# Patient Record
Sex: FEMALE | Race: WHITE | URBAN - METROPOLITAN AREA
[De-identification: names, ages, dates, MRNs, and addresses within clinical notes are randomized per-mention and may not be internally consistent; named-entity substitution may affect disease eponyms.]

---

## 2017-01-01 ENCOUNTER — OFFICE VISIT (OUTPATIENT)
Dept: URGENT CARE | Facility: URGENT CARE | Age: 82
End: 2017-01-01
Payer: COMMERCIAL

## 2017-01-01 ENCOUNTER — TELEPHONE (OUTPATIENT)
Dept: URGENT CARE | Facility: URGENT CARE | Age: 82
End: 2017-01-01

## 2017-01-01 ENCOUNTER — HOSPITAL ENCOUNTER (OUTPATIENT)
Dept: CT IMAGING | Facility: CLINIC | Age: 82
Discharge: HOME | End: 2017-09-13
Attending: SURGERY | Admitting: SURGERY
Payer: COMMERCIAL

## 2017-01-01 ENCOUNTER — APPOINTMENT (OUTPATIENT)
Dept: GENERAL RADIOLOGY | Facility: CLINIC | Age: 82
DRG: 207 | End: 2017-01-01
Attending: INTERNAL MEDICINE
Payer: COMMERCIAL

## 2017-01-01 ENCOUNTER — ALLIED HEALTH/NURSE VISIT (OUTPATIENT)
Dept: CARDIOLOGY | Facility: CLINIC | Age: 82
End: 2017-01-01
Payer: COMMERCIAL

## 2017-01-01 ENCOUNTER — ANESTHESIA (OUTPATIENT)
Dept: INTENSIVE CARE | Facility: CLINIC | Age: 82
DRG: 207 | End: 2017-01-01
Payer: COMMERCIAL

## 2017-01-01 ENCOUNTER — OFFICE VISIT (OUTPATIENT)
Dept: PEDIATRICS | Facility: CLINIC | Age: 82
End: 2017-01-01
Payer: COMMERCIAL

## 2017-01-01 ENCOUNTER — TELEPHONE (OUTPATIENT)
Dept: PEDIATRICS | Facility: CLINIC | Age: 82
End: 2017-01-01

## 2017-01-01 ENCOUNTER — HOSPITAL ENCOUNTER (INPATIENT)
Facility: CLINIC | Age: 82
LOS: 15 days | DRG: 207 | End: 2017-12-07
Attending: EMERGENCY MEDICINE | Admitting: INTERNAL MEDICINE
Payer: COMMERCIAL

## 2017-01-01 ENCOUNTER — APPOINTMENT (OUTPATIENT)
Dept: GENERAL RADIOLOGY | Facility: CLINIC | Age: 82
DRG: 207 | End: 2017-01-01
Attending: ANESTHESIOLOGY
Payer: COMMERCIAL

## 2017-01-01 ENCOUNTER — RADIANT APPOINTMENT (OUTPATIENT)
Dept: GENERAL RADIOLOGY | Facility: CLINIC | Age: 82
End: 2017-01-01
Attending: FAMILY MEDICINE
Payer: COMMERCIAL

## 2017-01-01 ENCOUNTER — APPOINTMENT (OUTPATIENT)
Dept: SPEECH THERAPY | Facility: CLINIC | Age: 82
DRG: 207 | End: 2017-01-01
Attending: INTERNAL MEDICINE
Payer: COMMERCIAL

## 2017-01-01 ENCOUNTER — APPOINTMENT (OUTPATIENT)
Dept: CARDIOLOGY | Facility: CLINIC | Age: 82
DRG: 207 | End: 2017-01-01
Attending: INTERNAL MEDICINE
Payer: COMMERCIAL

## 2017-01-01 ENCOUNTER — OFFICE VISIT (OUTPATIENT)
Dept: SURGERY | Facility: CLINIC | Age: 82
End: 2017-01-01
Payer: COMMERCIAL

## 2017-01-01 ENCOUNTER — APPOINTMENT (OUTPATIENT)
Dept: CT IMAGING | Facility: CLINIC | Age: 82
DRG: 207 | End: 2017-01-01
Attending: INTERNAL MEDICINE
Payer: COMMERCIAL

## 2017-01-01 ENCOUNTER — APPOINTMENT (OUTPATIENT)
Dept: SPEECH THERAPY | Facility: CLINIC | Age: 82
DRG: 207 | End: 2017-01-01
Payer: COMMERCIAL

## 2017-01-01 ENCOUNTER — APPOINTMENT (OUTPATIENT)
Dept: GENERAL RADIOLOGY | Facility: CLINIC | Age: 82
DRG: 207 | End: 2017-01-01
Attending: EMERGENCY MEDICINE
Payer: COMMERCIAL

## 2017-01-01 ENCOUNTER — TELEPHONE (OUTPATIENT)
Dept: CARDIOLOGY | Facility: CLINIC | Age: 82
End: 2017-01-01

## 2017-01-01 ENCOUNTER — APPOINTMENT (OUTPATIENT)
Dept: PHYSICAL THERAPY | Facility: CLINIC | Age: 82
DRG: 207 | End: 2017-01-01
Attending: INTERNAL MEDICINE
Payer: COMMERCIAL

## 2017-01-01 ENCOUNTER — APPOINTMENT (OUTPATIENT)
Dept: OCCUPATIONAL THERAPY | Facility: CLINIC | Age: 82
DRG: 207 | End: 2017-01-01
Attending: INTERNAL MEDICINE
Payer: COMMERCIAL

## 2017-01-01 ENCOUNTER — APPOINTMENT (OUTPATIENT)
Dept: ULTRASOUND IMAGING | Facility: CLINIC | Age: 82
DRG: 207 | End: 2017-01-01
Attending: PHYSICIAN ASSISTANT
Payer: COMMERCIAL

## 2017-01-01 ENCOUNTER — OFFICE VISIT (OUTPATIENT)
Dept: CARDIOLOGY | Facility: CLINIC | Age: 82
End: 2017-01-01
Payer: COMMERCIAL

## 2017-01-01 ENCOUNTER — DOCUMENTATION ONLY (OUTPATIENT)
Dept: CARDIOLOGY | Facility: CLINIC | Age: 82
End: 2017-01-01

## 2017-01-01 ENCOUNTER — OFFICE VISIT (OUTPATIENT)
Dept: PODIATRY | Facility: CLINIC | Age: 82
End: 2017-01-01
Payer: COMMERCIAL

## 2017-01-01 ENCOUNTER — ANESTHESIA EVENT (OUTPATIENT)
Dept: INTENSIVE CARE | Facility: CLINIC | Age: 82
DRG: 207 | End: 2017-01-01
Payer: COMMERCIAL

## 2017-01-01 ENCOUNTER — APPOINTMENT (OUTPATIENT)
Dept: CARDIOLOGY | Facility: CLINIC | Age: 82
DRG: 207 | End: 2017-01-01
Attending: PHYSICIAN ASSISTANT
Payer: COMMERCIAL

## 2017-01-01 ENCOUNTER — APPOINTMENT (OUTPATIENT)
Dept: CT IMAGING | Facility: CLINIC | Age: 82
DRG: 207 | End: 2017-01-01
Attending: EMERGENCY MEDICINE
Payer: COMMERCIAL

## 2017-01-01 ENCOUNTER — RADIANT APPOINTMENT (OUTPATIENT)
Dept: GENERAL RADIOLOGY | Facility: CLINIC | Age: 82
End: 2017-01-01
Attending: PODIATRIST
Payer: COMMERCIAL

## 2017-01-01 VITALS
SYSTOLIC BLOOD PRESSURE: 116 MMHG | TEMPERATURE: 97.5 F | RESPIRATION RATE: 20 BRPM | OXYGEN SATURATION: 96 % | HEART RATE: 74 BPM | DIASTOLIC BLOOD PRESSURE: 70 MMHG

## 2017-01-01 VITALS
TEMPERATURE: 97.1 F | WEIGHT: 170 LBS | RESPIRATION RATE: 30 BRPM | OXYGEN SATURATION: 92 % | HEIGHT: 60 IN | HEART RATE: 60 BPM | SYSTOLIC BLOOD PRESSURE: 100 MMHG | DIASTOLIC BLOOD PRESSURE: 56 MMHG | BODY MASS INDEX: 33.38 KG/M2

## 2017-01-01 VITALS
TEMPERATURE: 96.7 F | OXYGEN SATURATION: 95 % | HEIGHT: 60 IN | BODY MASS INDEX: 33.8 KG/M2 | SYSTOLIC BLOOD PRESSURE: 118 MMHG | DIASTOLIC BLOOD PRESSURE: 66 MMHG | HEART RATE: 60 BPM | WEIGHT: 172.19 LBS

## 2017-01-01 VITALS
WEIGHT: 170.9 LBS | OXYGEN SATURATION: 95 % | HEIGHT: 60 IN | SYSTOLIC BLOOD PRESSURE: 112 MMHG | TEMPERATURE: 97.8 F | BODY MASS INDEX: 33.55 KG/M2 | HEART RATE: 78 BPM | DIASTOLIC BLOOD PRESSURE: 68 MMHG

## 2017-01-01 VITALS
SYSTOLIC BLOOD PRESSURE: 93 MMHG | OXYGEN SATURATION: 100 % | DIASTOLIC BLOOD PRESSURE: 47 MMHG | HEART RATE: 60 BPM | WEIGHT: 171.96 LBS | TEMPERATURE: 101.7 F | HEIGHT: 60 IN | RESPIRATION RATE: 26 BRPM | BODY MASS INDEX: 33.76 KG/M2

## 2017-01-01 VITALS
OXYGEN SATURATION: 93 % | BODY MASS INDEX: 34.36 KG/M2 | HEIGHT: 60 IN | DIASTOLIC BLOOD PRESSURE: 68 MMHG | HEART RATE: 79 BPM | SYSTOLIC BLOOD PRESSURE: 104 MMHG | WEIGHT: 175 LBS

## 2017-01-01 VITALS
OXYGEN SATURATION: 95 % | SYSTOLIC BLOOD PRESSURE: 136 MMHG | DIASTOLIC BLOOD PRESSURE: 80 MMHG | TEMPERATURE: 96.3 F | BODY MASS INDEX: 33.59 KG/M2 | WEIGHT: 172 LBS | HEART RATE: 89 BPM

## 2017-01-01 VITALS
WEIGHT: 175 LBS | HEART RATE: 86 BPM | DIASTOLIC BLOOD PRESSURE: 70 MMHG | BODY MASS INDEX: 34.36 KG/M2 | TEMPERATURE: 97.6 F | SYSTOLIC BLOOD PRESSURE: 122 MMHG | OXYGEN SATURATION: 97 % | HEIGHT: 60 IN

## 2017-01-01 VITALS — BODY MASS INDEX: 33.38 KG/M2 | HEIGHT: 60 IN | RESPIRATION RATE: 18 BRPM | WEIGHT: 170 LBS

## 2017-01-01 VITALS
HEIGHT: 60 IN | HEART RATE: 80 BPM | BODY MASS INDEX: 33.57 KG/M2 | SYSTOLIC BLOOD PRESSURE: 124 MMHG | DIASTOLIC BLOOD PRESSURE: 80 MMHG | WEIGHT: 171 LBS

## 2017-01-01 DIAGNOSIS — I71.40 ABDOMINAL AORTIC ANEURYSM (AAA) WITHOUT RUPTURE (H): ICD-10-CM

## 2017-01-01 DIAGNOSIS — I10 BENIGN ESSENTIAL HYPERTENSION: ICD-10-CM

## 2017-01-01 DIAGNOSIS — E78.5 HYPERLIPIDEMIA LDL GOAL <100: ICD-10-CM

## 2017-01-01 DIAGNOSIS — R06.03 ACUTE RESPIRATORY DISTRESS: Primary | ICD-10-CM

## 2017-01-01 DIAGNOSIS — I71.019 AORTIC DISSECTION, THORACIC (H): Primary | ICD-10-CM

## 2017-01-01 DIAGNOSIS — N39.0 URINARY TRACT INFECTION WITHOUT HEMATURIA, SITE UNSPECIFIED: Primary | ICD-10-CM

## 2017-01-01 DIAGNOSIS — I71.00 AORTIC DISSECTION (H): ICD-10-CM

## 2017-01-01 DIAGNOSIS — J18.9 COMMUNITY ACQUIRED PNEUMONIA, UNSPECIFIED LATERALITY: ICD-10-CM

## 2017-01-01 DIAGNOSIS — I71.019 AORTIC DISSECTION, THORACIC (H): ICD-10-CM

## 2017-01-01 DIAGNOSIS — S82.51XA CLOSED DISPLACED FRACTURE OF MEDIAL MALLEOLUS OF RIGHT TIBIA, INITIAL ENCOUNTER: Primary | ICD-10-CM

## 2017-01-01 DIAGNOSIS — G60.9 PERIPHERAL NEUROPATHY, IDIOPATHIC: ICD-10-CM

## 2017-01-01 DIAGNOSIS — I48.0 PAROXYSMAL ATRIAL FIBRILLATION (H): Primary | ICD-10-CM

## 2017-01-01 DIAGNOSIS — M79.604 PAIN OF RIGHT LOWER EXTREMITY: ICD-10-CM

## 2017-01-01 DIAGNOSIS — Z95.0 CARDIAC PACEMAKER IN SITU: Primary | ICD-10-CM

## 2017-01-01 DIAGNOSIS — R73.03 PREDIABETES: ICD-10-CM

## 2017-01-01 DIAGNOSIS — M81.0 OSTEOPOROSIS: ICD-10-CM

## 2017-01-01 DIAGNOSIS — R30.0 DYSURIA: ICD-10-CM

## 2017-01-01 DIAGNOSIS — I10 ESSENTIAL HYPERTENSION, BENIGN: ICD-10-CM

## 2017-01-01 DIAGNOSIS — N18.30 CKD (CHRONIC KIDNEY DISEASE) STAGE 3, GFR 30-59 ML/MIN (H): ICD-10-CM

## 2017-01-01 DIAGNOSIS — E66.9 OBESITY (BMI 30-39.9): ICD-10-CM

## 2017-01-01 DIAGNOSIS — G11.9 ATAXIA DUE TO CEREBELLAR DEGENERATION (H): Primary | ICD-10-CM

## 2017-01-01 DIAGNOSIS — I48.20 CHRONIC ATRIAL FIBRILLATION (H): ICD-10-CM

## 2017-01-01 DIAGNOSIS — R09.02 HYPOXIA: ICD-10-CM

## 2017-01-01 DIAGNOSIS — Z23 NEED FOR PROPHYLACTIC VACCINATION AND INOCULATION AGAINST INFLUENZA: ICD-10-CM

## 2017-01-01 DIAGNOSIS — N39.3 URINARY, INCONTINENCE, STRESS FEMALE: ICD-10-CM

## 2017-01-01 DIAGNOSIS — S82.891A CLOSED FRACTURE OF RIGHT ANKLE, INITIAL ENCOUNTER: ICD-10-CM

## 2017-01-01 DIAGNOSIS — Z79.01 CURRENT USE OF LONG TERM ANTICOAGULATION: ICD-10-CM

## 2017-01-01 DIAGNOSIS — G11.9 ATAXIA DUE TO CEREBELLAR DEGENERATION (H): ICD-10-CM

## 2017-01-01 DIAGNOSIS — R82.90 NONSPECIFIC FINDING ON EXAMINATION OF URINE: ICD-10-CM

## 2017-01-01 DIAGNOSIS — S99.911A INJURY OF RIGHT ANKLE, INITIAL ENCOUNTER: Primary | ICD-10-CM

## 2017-01-01 DIAGNOSIS — N28.9 RENAL INSUFFICIENCY: ICD-10-CM

## 2017-01-01 DIAGNOSIS — S99.911A INJURY OF RIGHT ANKLE, INITIAL ENCOUNTER: ICD-10-CM

## 2017-01-01 DIAGNOSIS — E78.5 HYPERLIPIDEMIA LDL GOAL <100: Primary | ICD-10-CM

## 2017-01-01 DIAGNOSIS — Z00.00 ROUTINE GENERAL MEDICAL EXAMINATION AT A HEALTH CARE FACILITY: Primary | ICD-10-CM

## 2017-01-01 DIAGNOSIS — M81.0 AGE-RELATED OSTEOPOROSIS WITHOUT CURRENT PATHOLOGICAL FRACTURE: ICD-10-CM

## 2017-01-01 DIAGNOSIS — I25.10 CORONARY ARTERY DISEASE INVOLVING NATIVE CORONARY ARTERY OF NATIVE HEART WITHOUT ANGINA PECTORIS: ICD-10-CM

## 2017-01-01 LAB
ALBUMIN SERPL-MCNC: 1.7 G/DL (ref 3.4–5)
ALBUMIN SERPL-MCNC: 2 G/DL (ref 3.4–5)
ALBUMIN SERPL-MCNC: 2.9 G/DL (ref 3.4–5)
ALBUMIN UR-MCNC: 100 MG/DL
ALBUMIN UR-MCNC: NEGATIVE MG/DL
ALP SERPL-CCNC: 104 U/L (ref 40–150)
ALP SERPL-CCNC: 109 U/L (ref 40–150)
ALP SERPL-CCNC: 149 U/L (ref 40–150)
ALT SERPL W P-5'-P-CCNC: 14 U/L (ref 0–50)
ALT SERPL W P-5'-P-CCNC: 14 U/L (ref 0–50)
ALT SERPL W P-5'-P-CCNC: 18 U/L (ref 0–50)
AMORPH CRY #/AREA URNS HPF: ABNORMAL /HPF
ANION GAP SERPL CALCULATED.3IONS-SCNC: 10 MMOL/L (ref 3–14)
ANION GAP SERPL CALCULATED.3IONS-SCNC: 11 MMOL/L (ref 3–14)
ANION GAP SERPL CALCULATED.3IONS-SCNC: 11 MMOL/L (ref 3–14)
ANION GAP SERPL CALCULATED.3IONS-SCNC: 5 MMOL/L (ref 3–14)
ANION GAP SERPL CALCULATED.3IONS-SCNC: 6 MMOL/L (ref 3–14)
ANION GAP SERPL CALCULATED.3IONS-SCNC: 6 MMOL/L (ref 3–14)
ANION GAP SERPL CALCULATED.3IONS-SCNC: 7 MMOL/L (ref 3–14)
ANION GAP SERPL CALCULATED.3IONS-SCNC: 7 MMOL/L (ref 3–14)
ANION GAP SERPL CALCULATED.3IONS-SCNC: 8 MMOL/L (ref 3–14)
ANION GAP SERPL CALCULATED.3IONS-SCNC: 9 MMOL/L (ref 3–14)
ANION GAP SERPL CALCULATED.3IONS-SCNC: 9 MMOL/L (ref 3–14)
APPEARANCE UR: ABNORMAL
APPEARANCE UR: ABNORMAL
APPEARANCE UR: CLEAR
APPEARANCE UR: CLEAR
AST SERPL W P-5'-P-CCNC: 14 U/L (ref 0–45)
AST SERPL W P-5'-P-CCNC: 18 U/L (ref 0–45)
AST SERPL W P-5'-P-CCNC: 26 U/L (ref 0–45)
BACTERIA #/AREA URNS HPF: ABNORMAL /HPF
BACTERIA #/AREA URNS HPF: ABNORMAL /HPF
BACTERIA SPEC CULT: ABNORMAL
BACTERIA SPEC CULT: ABNORMAL
BACTERIA SPEC CULT: NO GROWTH
BASE EXCESS BLDA CALC-SCNC: 0.5 MMOL/L
BASE EXCESS BLDA CALC-SCNC: 0.9 MMOL/L
BASE EXCESS BLDA CALC-SCNC: 1.3 MMOL/L
BASE EXCESS BLDA CALC-SCNC: 1.5 MMOL/L
BASE EXCESS BLDA CALC-SCNC: 1.8 MMOL/L
BASE EXCESS BLDA CALC-SCNC: 1.9 MMOL/L
BASE EXCESS BLDA CALC-SCNC: 2.4 MMOL/L
BASE EXCESS BLDA CALC-SCNC: 2.4 MMOL/L
BASE EXCESS BLDA CALC-SCNC: 3.5 MMOL/L
BASE EXCESS BLDA CALC-SCNC: 5.1 MMOL/L
BASE EXCESS BLDV CALC-SCNC: 1.3 MMOL/L
BASE EXCESS BLDV CALC-SCNC: 6.9 MMOL/L
BASOPHILS # BLD AUTO: 0 10E9/L (ref 0–0.2)
BASOPHILS # BLD AUTO: 0.1 10E9/L (ref 0–0.2)
BASOPHILS # BLD AUTO: 0.1 10E9/L (ref 0–0.2)
BASOPHILS NFR BLD AUTO: 0.2 %
BASOPHILS NFR BLD AUTO: 0.4 %
BASOPHILS NFR BLD AUTO: 0.5 %
BILIRUB DIRECT SERPL-MCNC: 0.2 MG/DL (ref 0–0.2)
BILIRUB SERPL-MCNC: 0.3 MG/DL (ref 0.2–1.3)
BILIRUB SERPL-MCNC: 0.5 MG/DL (ref 0.2–1.3)
BILIRUB SERPL-MCNC: 0.7 MG/DL (ref 0.2–1.3)
BILIRUB UR QL STRIP: NEGATIVE
BUN SERPL-MCNC: 24 MG/DL (ref 7–30)
BUN SERPL-MCNC: 24 MG/DL (ref 7–30)
BUN SERPL-MCNC: 26 MG/DL (ref 7–30)
BUN SERPL-MCNC: 28 MG/DL (ref 7–30)
BUN SERPL-MCNC: 31 MG/DL (ref 7–30)
BUN SERPL-MCNC: 34 MG/DL (ref 7–30)
BUN SERPL-MCNC: 35 MG/DL (ref 7–30)
BUN SERPL-MCNC: 42 MG/DL (ref 7–30)
BUN SERPL-MCNC: 45 MG/DL (ref 7–30)
BUN SERPL-MCNC: 49 MG/DL (ref 7–30)
BUN SERPL-MCNC: 49 MG/DL (ref 7–30)
BUN SERPL-MCNC: 51 MG/DL (ref 7–30)
BUN SERPL-MCNC: 54 MG/DL (ref 7–30)
BUN SERPL-MCNC: 60 MG/DL (ref 7–30)
BUN SERPL-MCNC: 62 MG/DL (ref 7–30)
C DIFF TOX B STL QL: NEGATIVE
CA-I BLD-MCNC: 4.3 MG/DL (ref 4.4–5.2)
CALCIUM SERPL-MCNC: 6.5 MG/DL (ref 8.5–10.1)
CALCIUM SERPL-MCNC: 7.6 MG/DL (ref 8.5–10.1)
CALCIUM SERPL-MCNC: 7.6 MG/DL (ref 8.5–10.1)
CALCIUM SERPL-MCNC: 7.8 MG/DL (ref 8.5–10.1)
CALCIUM SERPL-MCNC: 7.8 MG/DL (ref 8.5–10.1)
CALCIUM SERPL-MCNC: 8 MG/DL (ref 8.5–10.1)
CALCIUM SERPL-MCNC: 8.1 MG/DL (ref 8.5–10.1)
CALCIUM SERPL-MCNC: 8.3 MG/DL (ref 8.5–10.1)
CALCIUM SERPL-MCNC: 8.4 MG/DL (ref 8.5–10.1)
CALCIUM SERPL-MCNC: 8.4 MG/DL (ref 8.5–10.1)
CALCIUM SERPL-MCNC: 8.5 MG/DL (ref 8.5–10.1)
CALCIUM SERPL-MCNC: 8.6 MG/DL (ref 8.5–10.1)
CALCIUM SERPL-MCNC: 8.7 MG/DL (ref 8.5–10.1)
CHLORIDE SERPL-SCNC: 100 MMOL/L (ref 94–109)
CHLORIDE SERPL-SCNC: 101 MMOL/L (ref 94–109)
CHLORIDE SERPL-SCNC: 102 MMOL/L (ref 94–109)
CHLORIDE SERPL-SCNC: 103 MMOL/L (ref 94–109)
CHLORIDE SERPL-SCNC: 104 MMOL/L (ref 94–109)
CHLORIDE SERPL-SCNC: 104 MMOL/L (ref 94–109)
CHLORIDE SERPL-SCNC: 106 MMOL/L (ref 94–109)
CHLORIDE SERPL-SCNC: 109 MMOL/L (ref 94–109)
CHLORIDE SERPL-SCNC: 110 MMOL/L (ref 94–109)
CHLORIDE SERPL-SCNC: 111 MMOL/L (ref 94–109)
CHLORIDE SERPL-SCNC: 112 MMOL/L (ref 94–109)
CHLORIDE SERPL-SCNC: 98 MMOL/L (ref 94–109)
CHOLEST SERPL-MCNC: 99 MG/DL
CO2 BLDCOV-SCNC: 26 MMOL/L (ref 21–28)
CO2 SERPL-SCNC: 23 MMOL/L (ref 20–32)
CO2 SERPL-SCNC: 25 MMOL/L (ref 20–32)
CO2 SERPL-SCNC: 26 MMOL/L (ref 20–32)
CO2 SERPL-SCNC: 27 MMOL/L (ref 20–32)
CO2 SERPL-SCNC: 27 MMOL/L (ref 20–32)
CO2 SERPL-SCNC: 28 MMOL/L (ref 20–32)
CO2 SERPL-SCNC: 28 MMOL/L (ref 20–32)
CO2 SERPL-SCNC: 29 MMOL/L (ref 20–32)
CO2 SERPL-SCNC: 29 MMOL/L (ref 20–32)
CO2 SERPL-SCNC: 31 MMOL/L (ref 20–32)
COLOR UR AUTO: YELLOW
CORTIS SERPL-MCNC: 48 UG/DL (ref 4–22)
CREAT BLD-MCNC: 1.2 MG/DL (ref 0.52–1.04)
CREAT SERPL-MCNC: 0.8 MG/DL (ref 0.52–1.04)
CREAT SERPL-MCNC: 0.98 MG/DL (ref 0.52–1.04)
CREAT SERPL-MCNC: 1.01 MG/DL (ref 0.52–1.04)
CREAT SERPL-MCNC: 1.02 MG/DL (ref 0.52–1.04)
CREAT SERPL-MCNC: 1.04 MG/DL (ref 0.52–1.04)
CREAT SERPL-MCNC: 1.05 MG/DL (ref 0.52–1.04)
CREAT SERPL-MCNC: 1.05 MG/DL (ref 0.52–1.04)
CREAT SERPL-MCNC: 1.07 MG/DL (ref 0.52–1.04)
CREAT SERPL-MCNC: 1.08 MG/DL (ref 0.52–1.04)
CREAT SERPL-MCNC: 1.08 MG/DL (ref 0.52–1.04)
CREAT SERPL-MCNC: 1.1 MG/DL (ref 0.52–1.04)
CREAT SERPL-MCNC: 1.13 MG/DL (ref 0.52–1.04)
CREAT SERPL-MCNC: 1.17 MG/DL (ref 0.52–1.04)
CREAT SERPL-MCNC: 1.19 MG/DL (ref 0.52–1.04)
CREAT SERPL-MCNC: 1.26 MG/DL (ref 0.52–1.04)
CREAT SERPL-MCNC: 1.3 MG/DL (ref 0.52–1.04)
CREAT SERPL-MCNC: 1.35 MG/DL (ref 0.52–1.04)
CREAT UR-MCNC: 119 MG/DL
CRP SERPL-MCNC: 82.6 MG/L (ref 0–8)
D DIMER PPP FEU-MCNC: 3.1 UG/ML FEU (ref 0–0.5)
DIFFERENTIAL METHOD BLD: ABNORMAL
EOSINOPHIL # BLD AUTO: 0.3 10E9/L (ref 0–0.7)
EOSINOPHIL # BLD AUTO: 0.4 10E9/L (ref 0–0.7)
EOSINOPHIL # BLD AUTO: 0.6 10E9/L (ref 0–0.7)
EOSINOPHIL NFR BLD AUTO: 2.4 %
EOSINOPHIL NFR BLD AUTO: 2.4 %
EOSINOPHIL NFR BLD AUTO: 3.9 %
ERYTHROCYTE [DISTWIDTH] IN BLOOD BY AUTOMATED COUNT: 13.6 % (ref 10–15)
ERYTHROCYTE [DISTWIDTH] IN BLOOD BY AUTOMATED COUNT: 13.7 % (ref 10–15)
ERYTHROCYTE [DISTWIDTH] IN BLOOD BY AUTOMATED COUNT: 13.8 % (ref 10–15)
ERYTHROCYTE [DISTWIDTH] IN BLOOD BY AUTOMATED COUNT: 13.8 % (ref 10–15)
ERYTHROCYTE [DISTWIDTH] IN BLOOD BY AUTOMATED COUNT: 14 % (ref 10–15)
ERYTHROCYTE [DISTWIDTH] IN BLOOD BY AUTOMATED COUNT: 14 % (ref 10–15)
ERYTHROCYTE [DISTWIDTH] IN BLOOD BY AUTOMATED COUNT: 14.1 % (ref 10–15)
ERYTHROCYTE [DISTWIDTH] IN BLOOD BY AUTOMATED COUNT: 14.2 % (ref 10–15)
ERYTHROCYTE [DISTWIDTH] IN BLOOD BY AUTOMATED COUNT: 14.3 % (ref 10–15)
ERYTHROCYTE [DISTWIDTH] IN BLOOD BY AUTOMATED COUNT: 14.5 % (ref 10–15)
ERYTHROCYTE [DISTWIDTH] IN BLOOD BY AUTOMATED COUNT: 14.6 % (ref 10–15)
FLUAV H1 2009 PAND RNA SPEC QL NAA+PROBE: NEGATIVE
FLUAV H1 RNA SPEC QL NAA+PROBE: NEGATIVE
FLUAV H3 RNA SPEC QL NAA+PROBE: NEGATIVE
FLUAV RNA SPEC QL NAA+PROBE: NEGATIVE
FLUAV+FLUBV AG SPEC QL: NEGATIVE
FLUAV+FLUBV AG SPEC QL: NEGATIVE
FLUBV RNA SPEC QL NAA+PROBE: NEGATIVE
GFR SERPL CREATININE-BSD FRML MDRD: 37 ML/MIN/1.7M2
GFR SERPL CREATININE-BSD FRML MDRD: 39 ML/MIN/1.7M2
GFR SERPL CREATININE-BSD FRML MDRD: 40 ML/MIN/1.7M2
GFR SERPL CREATININE-BSD FRML MDRD: 42 ML/MIN/1.7M2
GFR SERPL CREATININE-BSD FRML MDRD: 43 ML/MIN/1.7M2
GFR SERPL CREATININE-BSD FRML MDRD: 44 ML/MIN/1.7M2
GFR SERPL CREATININE-BSD FRML MDRD: 45 ML/MIN/1.7M2
GFR SERPL CREATININE-BSD FRML MDRD: 47 ML/MIN/1.7M2
GFR SERPL CREATININE-BSD FRML MDRD: 48 ML/MIN/1.7M2
GFR SERPL CREATININE-BSD FRML MDRD: 49 ML/MIN/1.7M2
GFR SERPL CREATININE-BSD FRML MDRD: 49 ML/MIN/1.7M2
GFR SERPL CREATININE-BSD FRML MDRD: 50 ML/MIN/1.7M2
GFR SERPL CREATININE-BSD FRML MDRD: 51 ML/MIN/1.7M2
GFR SERPL CREATININE-BSD FRML MDRD: 52 ML/MIN/1.7M2
GFR SERPL CREATININE-BSD FRML MDRD: 53 ML/MIN/1.7M2
GFR SERPL CREATININE-BSD FRML MDRD: 68 ML/MIN/1.7M2
GLUCOSE BLDC GLUCOMTR-MCNC: 101 MG/DL (ref 70–99)
GLUCOSE BLDC GLUCOMTR-MCNC: 102 MG/DL (ref 70–99)
GLUCOSE BLDC GLUCOMTR-MCNC: 107 MG/DL (ref 70–99)
GLUCOSE BLDC GLUCOMTR-MCNC: 115 MG/DL (ref 70–99)
GLUCOSE BLDC GLUCOMTR-MCNC: 118 MG/DL (ref 70–99)
GLUCOSE BLDC GLUCOMTR-MCNC: 120 MG/DL (ref 70–99)
GLUCOSE BLDC GLUCOMTR-MCNC: 123 MG/DL (ref 70–99)
GLUCOSE BLDC GLUCOMTR-MCNC: 124 MG/DL (ref 70–99)
GLUCOSE BLDC GLUCOMTR-MCNC: 124 MG/DL (ref 70–99)
GLUCOSE BLDC GLUCOMTR-MCNC: 125 MG/DL (ref 70–99)
GLUCOSE BLDC GLUCOMTR-MCNC: 126 MG/DL (ref 70–99)
GLUCOSE BLDC GLUCOMTR-MCNC: 127 MG/DL (ref 70–99)
GLUCOSE BLDC GLUCOMTR-MCNC: 130 MG/DL (ref 70–99)
GLUCOSE BLDC GLUCOMTR-MCNC: 131 MG/DL (ref 70–99)
GLUCOSE BLDC GLUCOMTR-MCNC: 132 MG/DL (ref 70–99)
GLUCOSE BLDC GLUCOMTR-MCNC: 132 MG/DL (ref 70–99)
GLUCOSE BLDC GLUCOMTR-MCNC: 133 MG/DL (ref 70–99)
GLUCOSE BLDC GLUCOMTR-MCNC: 133 MG/DL (ref 70–99)
GLUCOSE BLDC GLUCOMTR-MCNC: 134 MG/DL (ref 70–99)
GLUCOSE BLDC GLUCOMTR-MCNC: 134 MG/DL (ref 70–99)
GLUCOSE BLDC GLUCOMTR-MCNC: 137 MG/DL (ref 70–99)
GLUCOSE BLDC GLUCOMTR-MCNC: 139 MG/DL (ref 70–99)
GLUCOSE BLDC GLUCOMTR-MCNC: 140 MG/DL (ref 70–99)
GLUCOSE BLDC GLUCOMTR-MCNC: 140 MG/DL (ref 70–99)
GLUCOSE BLDC GLUCOMTR-MCNC: 143 MG/DL (ref 70–99)
GLUCOSE BLDC GLUCOMTR-MCNC: 144 MG/DL (ref 70–99)
GLUCOSE BLDC GLUCOMTR-MCNC: 144 MG/DL (ref 70–99)
GLUCOSE BLDC GLUCOMTR-MCNC: 145 MG/DL (ref 70–99)
GLUCOSE BLDC GLUCOMTR-MCNC: 146 MG/DL (ref 70–99)
GLUCOSE BLDC GLUCOMTR-MCNC: 147 MG/DL (ref 70–99)
GLUCOSE BLDC GLUCOMTR-MCNC: 148 MG/DL (ref 70–99)
GLUCOSE BLDC GLUCOMTR-MCNC: 148 MG/DL (ref 70–99)
GLUCOSE BLDC GLUCOMTR-MCNC: 149 MG/DL (ref 70–99)
GLUCOSE BLDC GLUCOMTR-MCNC: 150 MG/DL (ref 70–99)
GLUCOSE BLDC GLUCOMTR-MCNC: 151 MG/DL (ref 70–99)
GLUCOSE BLDC GLUCOMTR-MCNC: 153 MG/DL (ref 70–99)
GLUCOSE BLDC GLUCOMTR-MCNC: 154 MG/DL (ref 70–99)
GLUCOSE BLDC GLUCOMTR-MCNC: 154 MG/DL (ref 70–99)
GLUCOSE BLDC GLUCOMTR-MCNC: 155 MG/DL (ref 70–99)
GLUCOSE BLDC GLUCOMTR-MCNC: 156 MG/DL (ref 70–99)
GLUCOSE BLDC GLUCOMTR-MCNC: 156 MG/DL (ref 70–99)
GLUCOSE BLDC GLUCOMTR-MCNC: 157 MG/DL (ref 70–99)
GLUCOSE BLDC GLUCOMTR-MCNC: 158 MG/DL (ref 70–99)
GLUCOSE BLDC GLUCOMTR-MCNC: 159 MG/DL (ref 70–99)
GLUCOSE BLDC GLUCOMTR-MCNC: 160 MG/DL (ref 70–99)
GLUCOSE BLDC GLUCOMTR-MCNC: 162 MG/DL (ref 70–99)
GLUCOSE BLDC GLUCOMTR-MCNC: 163 MG/DL (ref 70–99)
GLUCOSE BLDC GLUCOMTR-MCNC: 163 MG/DL (ref 70–99)
GLUCOSE BLDC GLUCOMTR-MCNC: 164 MG/DL (ref 70–99)
GLUCOSE BLDC GLUCOMTR-MCNC: 166 MG/DL (ref 70–99)
GLUCOSE BLDC GLUCOMTR-MCNC: 166 MG/DL (ref 70–99)
GLUCOSE BLDC GLUCOMTR-MCNC: 167 MG/DL (ref 70–99)
GLUCOSE BLDC GLUCOMTR-MCNC: 169 MG/DL (ref 70–99)
GLUCOSE BLDC GLUCOMTR-MCNC: 169 MG/DL (ref 70–99)
GLUCOSE BLDC GLUCOMTR-MCNC: 172 MG/DL (ref 70–99)
GLUCOSE BLDC GLUCOMTR-MCNC: 173 MG/DL (ref 70–99)
GLUCOSE BLDC GLUCOMTR-MCNC: 177 MG/DL (ref 70–99)
GLUCOSE BLDC GLUCOMTR-MCNC: 179 MG/DL (ref 70–99)
GLUCOSE BLDC GLUCOMTR-MCNC: 182 MG/DL (ref 70–99)
GLUCOSE BLDC GLUCOMTR-MCNC: 188 MG/DL (ref 70–99)
GLUCOSE BLDC GLUCOMTR-MCNC: 189 MG/DL (ref 70–99)
GLUCOSE BLDC GLUCOMTR-MCNC: 200 MG/DL (ref 70–99)
GLUCOSE BLDC GLUCOMTR-MCNC: 202 MG/DL (ref 70–99)
GLUCOSE BLDC GLUCOMTR-MCNC: 204 MG/DL (ref 70–99)
GLUCOSE BLDC GLUCOMTR-MCNC: 212 MG/DL (ref 70–99)
GLUCOSE BLDC GLUCOMTR-MCNC: 214 MG/DL (ref 70–99)
GLUCOSE BLDC GLUCOMTR-MCNC: 217 MG/DL (ref 70–99)
GLUCOSE BLDC GLUCOMTR-MCNC: 220 MG/DL (ref 70–99)
GLUCOSE BLDC GLUCOMTR-MCNC: 223 MG/DL (ref 70–99)
GLUCOSE BLDC GLUCOMTR-MCNC: 227 MG/DL (ref 70–99)
GLUCOSE BLDC GLUCOMTR-MCNC: 90 MG/DL (ref 70–99)
GLUCOSE BLDC GLUCOMTR-MCNC: 95 MG/DL (ref 70–99)
GLUCOSE BLDC GLUCOMTR-MCNC: 98 MG/DL (ref 70–99)
GLUCOSE SERPL-MCNC: 104 MG/DL (ref 70–99)
GLUCOSE SERPL-MCNC: 128 MG/DL (ref 70–99)
GLUCOSE SERPL-MCNC: 128 MG/DL (ref 70–99)
GLUCOSE SERPL-MCNC: 129 MG/DL (ref 70–99)
GLUCOSE SERPL-MCNC: 130 MG/DL (ref 70–99)
GLUCOSE SERPL-MCNC: 135 MG/DL (ref 70–99)
GLUCOSE SERPL-MCNC: 137 MG/DL (ref 70–99)
GLUCOSE SERPL-MCNC: 137 MG/DL (ref 70–99)
GLUCOSE SERPL-MCNC: 150 MG/DL (ref 70–99)
GLUCOSE SERPL-MCNC: 158 MG/DL (ref 70–99)
GLUCOSE SERPL-MCNC: 160 MG/DL (ref 70–99)
GLUCOSE SERPL-MCNC: 163 MG/DL (ref 70–99)
GLUCOSE SERPL-MCNC: 94 MG/DL (ref 70–99)
GLUCOSE UR STRIP-MCNC: NEGATIVE MG/DL
GRAM STN SPEC: NORMAL
GRAM STN SPEC: NORMAL
HADV DNA SPEC QL NAA+PROBE: NEGATIVE
HADV DNA SPEC QL NAA+PROBE: NEGATIVE
HBA1C MFR BLD: 5.3 % (ref 4.3–6)
HCO3 BLD-SCNC: 25 MMOL/L (ref 21–28)
HCO3 BLD-SCNC: 26 MMOL/L (ref 21–28)
HCO3 BLD-SCNC: 27 MMOL/L (ref 21–28)
HCO3 BLD-SCNC: 27 MMOL/L (ref 21–28)
HCO3 BLD-SCNC: 28 MMOL/L (ref 21–28)
HCO3 BLD-SCNC: 28 MMOL/L (ref 21–28)
HCO3 BLD-SCNC: 31 MMOL/L (ref 21–28)
HCO3 BLDV-SCNC: 27 MMOL/L (ref 21–28)
HCO3 BLDV-SCNC: 33 MMOL/L (ref 21–28)
HCT VFR BLD AUTO: 25.6 % (ref 35–47)
HCT VFR BLD AUTO: 26.3 % (ref 35–47)
HCT VFR BLD AUTO: 27.5 % (ref 35–47)
HCT VFR BLD AUTO: 28 % (ref 35–47)
HCT VFR BLD AUTO: 28.3 % (ref 35–47)
HCT VFR BLD AUTO: 30 % (ref 35–47)
HCT VFR BLD AUTO: 31.5 % (ref 35–47)
HCT VFR BLD AUTO: 33.2 % (ref 35–47)
HCT VFR BLD AUTO: 33.9 % (ref 35–47)
HCT VFR BLD AUTO: 35.5 % (ref 35–47)
HCT VFR BLD AUTO: 37.4 % (ref 35–47)
HDLC SERPL-MCNC: 31 MG/DL
HGB BLD-MCNC: 10.4 G/DL (ref 11.7–15.7)
HGB BLD-MCNC: 10.8 G/DL (ref 11.7–15.7)
HGB BLD-MCNC: 10.9 G/DL (ref 11.7–15.7)
HGB BLD-MCNC: 11.5 G/DL (ref 11.7–15.7)
HGB BLD-MCNC: 12.2 G/DL (ref 11.7–15.7)
HGB BLD-MCNC: 8.1 G/DL (ref 11.7–15.7)
HGB BLD-MCNC: 8.1 G/DL (ref 11.7–15.7)
HGB BLD-MCNC: 8.6 G/DL (ref 11.7–15.7)
HGB BLD-MCNC: 8.8 G/DL (ref 11.7–15.7)
HGB BLD-MCNC: 9 G/DL (ref 11.7–15.7)
HGB BLD-MCNC: 9.4 G/DL (ref 11.7–15.7)
HGB UR QL STRIP: ABNORMAL
HMPV RNA SPEC QL NAA+PROBE: NEGATIVE
HPIV1 RNA SPEC QL NAA+PROBE: NEGATIVE
HPIV2 RNA SPEC QL NAA+PROBE: NEGATIVE
HPIV3 RNA SPEC QL NAA+PROBE: NEGATIVE
HYALINE CASTS #/AREA URNS LPF: 3 /LPF (ref 0–2)
IMM GRANULOCYTES # BLD: 0.1 10E9/L (ref 0–0.4)
IMM GRANULOCYTES # BLD: 0.1 10E9/L (ref 0–0.4)
IMM GRANULOCYTES # BLD: 0.2 10E9/L (ref 0–0.4)
IMM GRANULOCYTES NFR BLD: 0.7 %
IMM GRANULOCYTES NFR BLD: 0.7 %
IMM GRANULOCYTES NFR BLD: 1 %
INTERPRETATION ECG - MUSE: NORMAL
KETONES UR STRIP-MCNC: NEGATIVE MG/DL
LACTATE BLD-SCNC: 1.5 MMOL/L (ref 0.7–2.1)
LDLC SERPL CALC-MCNC: 48 MG/DL
LEUKOCYTE ESTERASE UR QL STRIP: ABNORMAL
LEUKOCYTE ESTERASE UR QL STRIP: NEGATIVE
LYMPHOCYTES # BLD AUTO: 0.9 10E9/L (ref 0.8–5.3)
LYMPHOCYTES # BLD AUTO: 1 10E9/L (ref 0.8–5.3)
LYMPHOCYTES # BLD AUTO: 1.2 10E9/L (ref 0.8–5.3)
LYMPHOCYTES NFR BLD AUTO: 10.2 %
LYMPHOCYTES NFR BLD AUTO: 6.1 %
LYMPHOCYTES NFR BLD AUTO: 6.2 %
Lab: NORMAL
MAGNESIUM SERPL-MCNC: 2.2 MG/DL (ref 1.6–2.3)
MAGNESIUM SERPL-MCNC: 2.4 MG/DL (ref 1.6–2.3)
MAGNESIUM SERPL-MCNC: 2.6 MG/DL (ref 1.6–2.3)
MAGNESIUM SERPL-MCNC: 2.7 MG/DL (ref 1.6–2.3)
MAGNESIUM SERPL-MCNC: 2.7 MG/DL (ref 1.6–2.3)
MCH RBC QN AUTO: 27.9 PG (ref 26.5–33)
MCH RBC QN AUTO: 28.1 PG (ref 26.5–33)
MCH RBC QN AUTO: 28.3 PG (ref 26.5–33)
MCH RBC QN AUTO: 28.5 PG (ref 26.5–33)
MCH RBC QN AUTO: 28.8 PG (ref 26.5–33)
MCH RBC QN AUTO: 28.9 PG (ref 26.5–33)
MCH RBC QN AUTO: 28.9 PG (ref 26.5–33)
MCH RBC QN AUTO: 29.2 PG (ref 26.5–33)
MCH RBC QN AUTO: 29.8 PG (ref 26.5–33)
MCHC RBC AUTO-ENTMCNC: 30.4 G/DL (ref 31.5–36.5)
MCHC RBC AUTO-ENTMCNC: 30.8 G/DL (ref 31.5–36.5)
MCHC RBC AUTO-ENTMCNC: 31.3 G/DL (ref 31.5–36.5)
MCHC RBC AUTO-ENTMCNC: 31.6 G/DL (ref 31.5–36.5)
MCHC RBC AUTO-ENTMCNC: 32 G/DL (ref 31.5–36.5)
MCHC RBC AUTO-ENTMCNC: 32.1 G/DL (ref 31.5–36.5)
MCHC RBC AUTO-ENTMCNC: 32.2 G/DL (ref 31.5–36.5)
MCHC RBC AUTO-ENTMCNC: 32.4 G/DL (ref 31.5–36.5)
MCHC RBC AUTO-ENTMCNC: 32.5 G/DL (ref 31.5–36.5)
MCHC RBC AUTO-ENTMCNC: 32.6 G/DL (ref 31.5–36.5)
MCHC RBC AUTO-ENTMCNC: 33 G/DL (ref 31.5–36.5)
MCV RBC AUTO: 89 FL (ref 78–100)
MCV RBC AUTO: 90 FL (ref 78–100)
MCV RBC AUTO: 91 FL (ref 78–100)
MCV RBC AUTO: 92 FL (ref 78–100)
MCV RBC AUTO: 92 FL (ref 78–100)
MICRO REPORT STATUS: ABNORMAL
MICRO REPORT STATUS: ABNORMAL
MICROALBUMIN UR-MCNC: 50 MG/L
MICROALBUMIN/CREAT UR: 42.18 MG/G CR (ref 0–25)
MICROBIOLOGIST REVIEW: NORMAL
MICROORGANISM SPEC CULT: ABNORMAL
MONOCYTES # BLD AUTO: 0.5 10E9/L (ref 0–1.3)
MONOCYTES # BLD AUTO: 0.6 10E9/L (ref 0–1.3)
MONOCYTES # BLD AUTO: 0.9 10E9/L (ref 0–1.3)
MONOCYTES NFR BLD AUTO: 3.2 %
MONOCYTES NFR BLD AUTO: 3.6 %
MONOCYTES NFR BLD AUTO: 7.2 %
MRSA DNA SPEC QL NAA+PROBE: NEGATIVE
MUCOUS THREADS #/AREA URNS LPF: PRESENT /LPF
MUCOUS THREADS #/AREA URNS LPF: PRESENT /LPF
NEUTROPHILS # BLD AUTO: 12.9 10E9/L (ref 1.6–8.3)
NEUTROPHILS # BLD AUTO: 13.2 10E9/L (ref 1.6–8.3)
NEUTROPHILS # BLD AUTO: 9.5 10E9/L (ref 1.6–8.3)
NEUTROPHILS NFR BLD AUTO: 79 %
NEUTROPHILS NFR BLD AUTO: 85.7 %
NEUTROPHILS NFR BLD AUTO: 86.6 %
NITRATE UR QL: NEGATIVE
NITRATE UR QL: POSITIVE
NON-SQ EPI CELLS #/AREA URNS LPF: ABNORMAL /LPF
NONHDLC SERPL-MCNC: 68 MG/DL
NRBC # BLD AUTO: 0 10*3/UL
NRBC BLD AUTO-RTO: 0 /100
NT-PROBNP SERPL-MCNC: 6532 PG/ML (ref 0–1800)
NT-PROBNP SERPL-MCNC: 8316 PG/ML (ref 0–1800)
O2/TOTAL GAS SETTING VFR VENT: ABNORMAL %
O2/TOTAL GAS SETTING VFR VENT: NORMAL %
OXYHGB MFR BLD: 88 % (ref 92–100)
OXYHGB MFR BLD: 89 % (ref 92–100)
OXYHGB MFR BLD: 89 % (ref 92–100)
OXYHGB MFR BLD: 90 % (ref 92–100)
OXYHGB MFR BLD: 92 % (ref 92–100)
OXYHGB MFR BLD: 93 % (ref 92–100)
OXYHGB MFR BLD: 97 % (ref 92–100)
OXYHGB MFR BLD: 98 % (ref 92–100)
OXYHGB MFR BLDV: 76 %
OXYHGB MFR BLDV: 80 %
PCO2 BLD: 35 MM HG (ref 35–45)
PCO2 BLD: 36 MM HG (ref 35–45)
PCO2 BLD: 36 MM HG (ref 35–45)
PCO2 BLD: 37 MM HG (ref 35–45)
PCO2 BLD: 39 MM HG (ref 35–45)
PCO2 BLD: 41 MM HG (ref 35–45)
PCO2 BLD: 42 MM HG (ref 35–45)
PCO2 BLD: 46 MM HG (ref 35–45)
PCO2 BLD: 47 MM HG (ref 35–45)
PCO2 BLD: 50 MM HG (ref 35–45)
PCO2 BLDV: 43 MM HG (ref 40–50)
PCO2 BLDV: 45 MM HG (ref 40–50)
PCO2 BLDV: 51 MM HG (ref 40–50)
PH BLD: 7.35 PH (ref 7.35–7.45)
PH BLD: 7.37 PH (ref 7.35–7.45)
PH BLD: 7.39 PH (ref 7.35–7.45)
PH BLD: 7.42 PH (ref 7.35–7.45)
PH BLD: 7.42 PH (ref 7.35–7.45)
PH BLD: 7.46 PH (ref 7.35–7.45)
PH BLD: 7.47 PH (ref 7.35–7.45)
PH BLD: 7.47 PH (ref 7.35–7.45)
PH BLDV: 7.38 PH (ref 7.32–7.43)
PH BLDV: 7.38 PH (ref 7.32–7.43)
PH BLDV: 7.42 PH (ref 7.32–7.43)
PH UR STRIP: 5 PH (ref 5–7)
PH UR STRIP: 5 PH (ref 5–7)
PH UR STRIP: 5.5 PH (ref 5–7)
PH UR STRIP: 5.5 PH (ref 5–7)
PHOSPHATE SERPL-MCNC: 2.3 MG/DL (ref 2.5–4.5)
PHOSPHATE SERPL-MCNC: 2.8 MG/DL (ref 2.5–4.5)
PHOSPHATE SERPL-MCNC: 2.8 MG/DL (ref 2.5–4.5)
PHOSPHATE SERPL-MCNC: 4 MG/DL (ref 2.5–4.5)
PLATELET # BLD AUTO: 216 10E9/L (ref 150–450)
PLATELET # BLD AUTO: 227 10E9/L (ref 150–450)
PLATELET # BLD AUTO: 241 10E9/L (ref 150–450)
PLATELET # BLD AUTO: 244 10E9/L (ref 150–450)
PLATELET # BLD AUTO: 245 10E9/L (ref 150–450)
PLATELET # BLD AUTO: 247 10E9/L (ref 150–450)
PLATELET # BLD AUTO: 249 10E9/L (ref 150–450)
PLATELET # BLD AUTO: 260 10E9/L (ref 150–450)
PLATELET # BLD AUTO: 286 10E9/L (ref 150–450)
PLATELET # BLD AUTO: 296 10E9/L (ref 150–450)
PLATELET # BLD AUTO: 328 10E9/L (ref 150–450)
PO2 BLD: 112 MM HG (ref 80–105)
PO2 BLD: 37 MM HG (ref 80–105)
PO2 BLD: 54 MM HG (ref 80–105)
PO2 BLD: 57 MM HG (ref 80–105)
PO2 BLD: 57 MM HG (ref 80–105)
PO2 BLD: 58 MM HG (ref 80–105)
PO2 BLD: 67 MM HG (ref 80–105)
PO2 BLD: 70 MM HG (ref 80–105)
PO2 BLD: 84 MM HG (ref 80–105)
PO2 BLD: 90 MM HG (ref 80–105)
PO2 BLDV: 34 MM HG (ref 25–47)
PO2 BLDV: 43 MM HG (ref 25–47)
PO2 BLDV: 45 MM HG (ref 25–47)
POTASSIUM SERPL-SCNC: 2.8 MMOL/L (ref 3.4–5.3)
POTASSIUM SERPL-SCNC: 2.8 MMOL/L (ref 3.4–5.3)
POTASSIUM SERPL-SCNC: 2.9 MMOL/L (ref 3.4–5.3)
POTASSIUM SERPL-SCNC: 3.3 MMOL/L (ref 3.4–5.3)
POTASSIUM SERPL-SCNC: 3.5 MMOL/L (ref 3.4–5.3)
POTASSIUM SERPL-SCNC: 3.6 MMOL/L (ref 3.4–5.3)
POTASSIUM SERPL-SCNC: 3.6 MMOL/L (ref 3.4–5.3)
POTASSIUM SERPL-SCNC: 3.7 MMOL/L (ref 3.4–5.3)
POTASSIUM SERPL-SCNC: 3.8 MMOL/L (ref 3.4–5.3)
POTASSIUM SERPL-SCNC: 3.9 MMOL/L (ref 3.4–5.3)
POTASSIUM SERPL-SCNC: 3.9 MMOL/L (ref 3.4–5.3)
POTASSIUM SERPL-SCNC: 4.3 MMOL/L (ref 3.4–5.3)
POTASSIUM SERPL-SCNC: 4.5 MMOL/L (ref 3.4–5.3)
PREALB SERPL IA-MCNC: 12 MG/DL (ref 15–45)
PROCALCITONIN SERPL-MCNC: 0.4 NG/ML
PROCALCITONIN SERPL-MCNC: 0.58 NG/ML
PROT SERPL-MCNC: 6.4 G/DL (ref 6.8–8.8)
PROT SERPL-MCNC: 6.7 G/DL (ref 6.8–8.8)
PROT SERPL-MCNC: 7.3 G/DL (ref 6.8–8.8)
RADIOLOGIST FLAGS: ABNORMAL
RADIOLOGIST FLAGS: ABNORMAL
RBC # BLD AUTO: 2.84 10E12/L (ref 3.8–5.2)
RBC # BLD AUTO: 2.86 10E12/L (ref 3.8–5.2)
RBC # BLD AUTO: 3.06 10E12/L (ref 3.8–5.2)
RBC # BLD AUTO: 3.08 10E12/L (ref 3.8–5.2)
RBC # BLD AUTO: 3.16 10E12/L (ref 3.8–5.2)
RBC # BLD AUTO: 3.35 10E12/L (ref 3.8–5.2)
RBC # BLD AUTO: 3.56 10E12/L (ref 3.8–5.2)
RBC # BLD AUTO: 3.74 10E12/L (ref 3.8–5.2)
RBC # BLD AUTO: 3.83 10E12/L (ref 3.8–5.2)
RBC # BLD AUTO: 3.98 10E12/L (ref 3.8–5.2)
RBC # BLD AUTO: 4.09 10E12/L (ref 3.8–5.2)
RBC #/AREA URNS AUTO: 1 /HPF (ref 0–2)
RBC #/AREA URNS AUTO: >182 /HPF (ref 0–2)
RBC #/AREA URNS AUTO: ABNORMAL /HPF (ref 0–2)
RBC #/AREA URNS AUTO: ABNORMAL /HPF (ref 0–2)
RHINOVIRUS RNA SPEC QL NAA+PROBE: NEGATIVE
RSV RNA SPEC QL NAA+PROBE: NEGATIVE
RSV RNA SPEC QL NAA+PROBE: NEGATIVE
SAO2 % BLDV FROM PO2: 64 %
SODIUM SERPL-SCNC: 137 MMOL/L (ref 133–144)
SODIUM SERPL-SCNC: 137 MMOL/L (ref 133–144)
SODIUM SERPL-SCNC: 138 MMOL/L (ref 133–144)
SODIUM SERPL-SCNC: 139 MMOL/L (ref 133–144)
SODIUM SERPL-SCNC: 140 MMOL/L (ref 133–144)
SODIUM SERPL-SCNC: 141 MMOL/L (ref 133–144)
SODIUM SERPL-SCNC: 141 MMOL/L (ref 133–144)
SODIUM SERPL-SCNC: 142 MMOL/L (ref 133–144)
SODIUM SERPL-SCNC: 142 MMOL/L (ref 133–144)
SODIUM SERPL-SCNC: 143 MMOL/L (ref 133–144)
SODIUM SERPL-SCNC: 144 MMOL/L (ref 133–144)
SODIUM SERPL-SCNC: 144 MMOL/L (ref 133–144)
SODIUM SERPL-SCNC: 145 MMOL/L (ref 133–144)
SODIUM SERPL-SCNC: 145 MMOL/L (ref 133–144)
SODIUM SERPL-SCNC: 147 MMOL/L (ref 133–144)
SOURCE: ABNORMAL
SOURCE: ABNORMAL
SP GR UR STRIP: 1.01 (ref 1–1.03)
SP GR UR STRIP: 1.01 (ref 1–1.03)
SP GR UR STRIP: 1.02 (ref 1–1.03)
SP GR UR STRIP: 1.03 (ref 1–1.03)
SPECIMEN SOURCE: ABNORMAL
SPECIMEN SOURCE: ABNORMAL
SPECIMEN SOURCE: NORMAL
SQUAMOUS #/AREA URNS AUTO: 1 /HPF (ref 0–1)
SQUAMOUS #/AREA URNS AUTO: <1 /HPF (ref 0–1)
TRIGL SERPL-MCNC: 98 MG/DL
TROPONIN I SERPL-MCNC: <0.015 UG/L (ref 0–0.04)
TROPONIN I SERPL-MCNC: <0.015 UG/L (ref 0–0.04)
URN SPEC COLLECT METH UR: ABNORMAL
URN SPEC COLLECT METH UR: ABNORMAL
UROBILINOGEN UR STRIP-ACNC: 0.2 EU/DL (ref 0.2–1)
UROBILINOGEN UR STRIP-ACNC: 0.2 EU/DL (ref 0.2–1)
UROBILINOGEN UR STRIP-MCNC: 0 MG/DL (ref 0–2)
UROBILINOGEN UR STRIP-MCNC: 0 MG/DL (ref 0–2)
WBC # BLD AUTO: 10.4 10E9/L (ref 4–11)
WBC # BLD AUTO: 10.4 10E9/L (ref 4–11)
WBC # BLD AUTO: 12.2 10E9/L (ref 4–11)
WBC # BLD AUTO: 12.4 10E9/L (ref 4–11)
WBC # BLD AUTO: 13.3 10E9/L (ref 4–11)
WBC # BLD AUTO: 14.4 10E9/L (ref 4–11)
WBC # BLD AUTO: 15 10E9/L (ref 4–11)
WBC # BLD AUTO: 15.2 10E9/L (ref 4–11)
WBC # BLD AUTO: 7.2 10E9/L (ref 4–11)
WBC # BLD AUTO: 8.9 10E9/L (ref 4–11)
WBC # BLD AUTO: 9.1 10E9/L (ref 4–11)
WBC #/AREA URNS AUTO: 1 /HPF (ref 0–2)
WBC #/AREA URNS AUTO: 10 /HPF (ref 0–2)
WBC #/AREA URNS AUTO: ABNORMAL /HPF (ref 0–2)
WBC #/AREA URNS AUTO: ABNORMAL /HPF (ref 0–2)

## 2017-01-01 PROCEDURE — 87040 BLOOD CULTURE FOR BACTERIA: CPT | Performed by: INTERNAL MEDICINE

## 2017-01-01 PROCEDURE — 84100 ASSAY OF PHOSPHORUS: CPT | Performed by: INTERNAL MEDICINE

## 2017-01-01 PROCEDURE — 00000146 ZZHCL STATISTIC GLUCOSE BY METER IP

## 2017-01-01 PROCEDURE — 99233 SBSQ HOSP IP/OBS HIGH 50: CPT | Performed by: NURSE PRACTITIONER

## 2017-01-01 PROCEDURE — 25000125 ZZHC RX 250: Performed by: PHYSICIAN ASSISTANT

## 2017-01-01 PROCEDURE — 25000125 ZZHC RX 250: Performed by: ANESTHESIOLOGY

## 2017-01-01 PROCEDURE — 94640 AIRWAY INHALATION TREATMENT: CPT | Mod: 76

## 2017-01-01 PROCEDURE — 25000131 ZZH RX MED GY IP 250 OP 636 PS 637: Performed by: ANESTHESIOLOGY

## 2017-01-01 PROCEDURE — 25000128 H RX IP 250 OP 636: Performed by: INTERNAL MEDICINE

## 2017-01-01 PROCEDURE — 25000132 ZZH RX MED GY IP 250 OP 250 PS 637

## 2017-01-01 PROCEDURE — 36600 WITHDRAWAL OF ARTERIAL BLOOD: CPT

## 2017-01-01 PROCEDURE — 82803 BLOOD GASES ANY COMBINATION: CPT | Performed by: INTERNAL MEDICINE

## 2017-01-01 PROCEDURE — 20000003 ZZH R&B ICU

## 2017-01-01 PROCEDURE — 80048 BASIC METABOLIC PNL TOTAL CA: CPT | Performed by: INTERNAL MEDICINE

## 2017-01-01 PROCEDURE — 27210437 ZZH NUTRITION PRODUCT SEMIELEM INTERMED LITER

## 2017-01-01 PROCEDURE — 25000125 ZZHC RX 250: Performed by: INTERNAL MEDICINE

## 2017-01-01 PROCEDURE — 87186 SC STD MICRODIL/AGAR DIL: CPT | Performed by: FAMILY MEDICINE

## 2017-01-01 PROCEDURE — 40000986 XR ABDOMEN PORT F1 VW

## 2017-01-01 PROCEDURE — 40000275 ZZH STATISTIC RCP TIME EA 10 MIN

## 2017-01-01 PROCEDURE — 83880 ASSAY OF NATRIURETIC PEPTIDE: CPT | Performed by: INTERNAL MEDICINE

## 2017-01-01 PROCEDURE — 97163 PT EVAL HIGH COMPLEX 45 MIN: CPT | Mod: GP | Performed by: PHYSICAL THERAPIST

## 2017-01-01 PROCEDURE — 36415 COLL VENOUS BLD VENIPUNCTURE: CPT | Performed by: INTERNAL MEDICINE

## 2017-01-01 PROCEDURE — 92526 ORAL FUNCTION THERAPY: CPT | Mod: GN | Performed by: SPEECH-LANGUAGE PATHOLOGIST

## 2017-01-01 PROCEDURE — 25000132 ZZH RX MED GY IP 250 OP 250 PS 637: Performed by: INTERNAL MEDICINE

## 2017-01-01 PROCEDURE — 99233 SBSQ HOSP IP/OBS HIGH 50: CPT | Performed by: INTERNAL MEDICINE

## 2017-01-01 PROCEDURE — 96365 THER/PROPH/DIAG IV INF INIT: CPT

## 2017-01-01 PROCEDURE — 87040 BLOOD CULTURE FOR BACTERIA: CPT | Performed by: EMERGENCY MEDICINE

## 2017-01-01 PROCEDURE — 94660 CPAP INITIATION&MGMT: CPT

## 2017-01-01 PROCEDURE — 25000125 ZZHC RX 250

## 2017-01-01 PROCEDURE — 40000671 ZZH STATISTIC ANESTHESIA CASE

## 2017-01-01 PROCEDURE — 81001 URINALYSIS AUTO W/SCOPE: CPT | Performed by: FAMILY MEDICINE

## 2017-01-01 PROCEDURE — 92610 EVALUATE SWALLOWING FUNCTION: CPT | Mod: GN | Performed by: SPEECH-LANGUAGE PATHOLOGIST

## 2017-01-01 PROCEDURE — 87640 STAPH A DNA AMP PROBE: CPT | Performed by: INTERNAL MEDICINE

## 2017-01-01 PROCEDURE — 94645 CONT INHLJ TX EACH ADDL HOUR: CPT

## 2017-01-01 PROCEDURE — 93306 TTE W/DOPPLER COMPLETE: CPT | Mod: 26 | Performed by: INTERNAL MEDICINE

## 2017-01-01 PROCEDURE — 85027 COMPLETE CBC AUTOMATED: CPT | Performed by: INTERNAL MEDICINE

## 2017-01-01 PROCEDURE — 71010 XR CHEST PORT 1 VW: CPT

## 2017-01-01 PROCEDURE — 27210300 ZZH CANNULA HIGH FLOW, ADULT

## 2017-01-01 PROCEDURE — 99213 OFFICE O/P EST LOW 20 MIN: CPT | Mod: 25 | Performed by: INTERNAL MEDICINE

## 2017-01-01 PROCEDURE — 71260 CT THORAX DX C+: CPT

## 2017-01-01 PROCEDURE — 25000132 ZZH RX MED GY IP 250 OP 250 PS 637: Performed by: PHYSICIAN ASSISTANT

## 2017-01-01 PROCEDURE — 94640 AIRWAY INHALATION TREATMENT: CPT

## 2017-01-01 PROCEDURE — 93296 REM INTERROG EVL PM/IDS: CPT | Performed by: INTERNAL MEDICINE

## 2017-01-01 PROCEDURE — 73630 X-RAY EXAM OF FOOT: CPT | Mod: RT

## 2017-01-01 PROCEDURE — 82805 BLOOD GASES W/O2 SATURATION: CPT | Performed by: INTERNAL MEDICINE

## 2017-01-01 PROCEDURE — 80076 HEPATIC FUNCTION PANEL: CPT | Performed by: INTERNAL MEDICINE

## 2017-01-01 PROCEDURE — 97165 OT EVAL LOW COMPLEX 30 MIN: CPT | Mod: GO | Performed by: REHABILITATION PRACTITIONER

## 2017-01-01 PROCEDURE — 99291 CRITICAL CARE FIRST HOUR: CPT | Performed by: INTERNAL MEDICINE

## 2017-01-01 PROCEDURE — 85025 COMPLETE CBC W/AUTO DIFF WBC: CPT | Performed by: INTERNAL MEDICINE

## 2017-01-01 PROCEDURE — 25000132 ZZH RX MED GY IP 250 OP 250 PS 637: Performed by: CLINICAL NURSE SPECIALIST

## 2017-01-01 PROCEDURE — 85379 FIBRIN DEGRADATION QUANT: CPT | Performed by: EMERGENCY MEDICINE

## 2017-01-01 PROCEDURE — 80061 LIPID PANEL: CPT | Performed by: INTERNAL MEDICINE

## 2017-01-01 PROCEDURE — 97535 SELF CARE MNGMENT TRAINING: CPT | Mod: GO | Performed by: REHABILITATION PRACTITIONER

## 2017-01-01 PROCEDURE — 99212 OFFICE O/P EST SF 10 MIN: CPT

## 2017-01-01 PROCEDURE — 83735 ASSAY OF MAGNESIUM: CPT | Performed by: INTERNAL MEDICINE

## 2017-01-01 PROCEDURE — 90662 IIV NO PRSV INCREASED AG IM: CPT | Performed by: INTERNAL MEDICINE

## 2017-01-01 PROCEDURE — 99207 ZZC OFFICE-HOSPITAL ADMIT: CPT | Performed by: PHYSICIAN ASSISTANT

## 2017-01-01 PROCEDURE — 96368 THER/DIAG CONCURRENT INF: CPT

## 2017-01-01 PROCEDURE — 99213 OFFICE O/P EST LOW 20 MIN: CPT | Performed by: PHYSICIAN ASSISTANT

## 2017-01-01 PROCEDURE — 87804 INFLUENZA ASSAY W/OPTIC: CPT | Performed by: PHYSICIAN ASSISTANT

## 2017-01-01 PROCEDURE — 97530 THERAPEUTIC ACTIVITIES: CPT | Mod: GP | Performed by: PHYSICAL THERAPIST

## 2017-01-01 PROCEDURE — 40000193 ZZH STATISTIC PT WARD VISIT: Performed by: PHYSICAL THERAPIST

## 2017-01-01 PROCEDURE — 99291 CRITICAL CARE FIRST HOUR: CPT | Performed by: ANESTHESIOLOGY

## 2017-01-01 PROCEDURE — 40000986 XR CHEST PORT 1 VW

## 2017-01-01 PROCEDURE — 40000225 ZZH STATISTIC SLP WARD VISIT: Performed by: SPEECH-LANGUAGE PATHOLOGIST

## 2017-01-01 PROCEDURE — 80048 BASIC METABOLIC PNL TOTAL CA: CPT | Performed by: ANESTHESIOLOGY

## 2017-01-01 PROCEDURE — G0008 ADMIN INFLUENZA VIRUS VAC: HCPCS | Performed by: INTERNAL MEDICINE

## 2017-01-01 PROCEDURE — 82565 ASSAY OF CREATININE: CPT | Performed by: INTERNAL MEDICINE

## 2017-01-01 PROCEDURE — 93970 EXTREMITY STUDY: CPT

## 2017-01-01 PROCEDURE — 71250 CT THORAX DX C-: CPT

## 2017-01-01 PROCEDURE — 87088 URINE BACTERIA CULTURE: CPT | Performed by: FAMILY MEDICINE

## 2017-01-01 PROCEDURE — 94003 VENT MGMT INPAT SUBQ DAY: CPT

## 2017-01-01 PROCEDURE — 99204 OFFICE O/P NEW MOD 45 MIN: CPT | Performed by: PODIATRIST

## 2017-01-01 PROCEDURE — 99213 OFFICE O/P EST LOW 20 MIN: CPT | Performed by: FAMILY MEDICINE

## 2017-01-01 PROCEDURE — 83605 ASSAY OF LACTIC ACID: CPT

## 2017-01-01 PROCEDURE — 84132 ASSAY OF SERUM POTASSIUM: CPT | Performed by: INTERNAL MEDICINE

## 2017-01-01 PROCEDURE — 87086 URINE CULTURE/COLONY COUNT: CPT | Performed by: PHYSICIAN ASSISTANT

## 2017-01-01 PROCEDURE — 74174 CTA ABD&PLVS W/CONTRAST: CPT

## 2017-01-01 PROCEDURE — 87086 URINE CULTURE/COLONY COUNT: CPT | Performed by: FAMILY MEDICINE

## 2017-01-01 PROCEDURE — 27210995 ZZH RX 272: Performed by: INTERNAL MEDICINE

## 2017-01-01 PROCEDURE — 99223 1ST HOSP IP/OBS HIGH 75: CPT | Mod: AI | Performed by: INTERNAL MEDICINE

## 2017-01-01 PROCEDURE — 93005 ELECTROCARDIOGRAM TRACING: CPT

## 2017-01-01 PROCEDURE — 99222 1ST HOSP IP/OBS MODERATE 55: CPT | Performed by: INTERNAL MEDICINE

## 2017-01-01 PROCEDURE — 83880 ASSAY OF NATRIURETIC PEPTIDE: CPT | Performed by: EMERGENCY MEDICINE

## 2017-01-01 PROCEDURE — 25000128 H RX IP 250 OP 636: Performed by: NURSE ANESTHETIST, CERTIFIED REGISTERED

## 2017-01-01 PROCEDURE — 81001 URINALYSIS AUTO W/SCOPE: CPT | Performed by: EMERGENCY MEDICINE

## 2017-01-01 PROCEDURE — 40000901 ZZH STATISTIC WOC PT EDUCATION, 0-15 MIN

## 2017-01-01 PROCEDURE — 25000128 H RX IP 250 OP 636: Performed by: SURGERY

## 2017-01-01 PROCEDURE — 31500 INSERT EMERGENCY AIRWAY: CPT

## 2017-01-01 PROCEDURE — 82805 BLOOD GASES W/O2 SATURATION: CPT | Performed by: ANESTHESIOLOGY

## 2017-01-01 PROCEDURE — 85027 COMPLETE CBC AUTOMATED: CPT | Performed by: EMERGENCY MEDICINE

## 2017-01-01 PROCEDURE — 93280 PM DEVICE PROGR EVAL DUAL: CPT | Performed by: INTERNAL MEDICINE

## 2017-01-01 PROCEDURE — 84484 ASSAY OF TROPONIN QUANT: CPT | Performed by: INTERNAL MEDICINE

## 2017-01-01 PROCEDURE — 36569 INSJ PICC 5 YR+ W/O IMAGING: CPT

## 2017-01-01 PROCEDURE — 82330 ASSAY OF CALCIUM: CPT | Performed by: INTERNAL MEDICINE

## 2017-01-01 PROCEDURE — 25000131 ZZH RX MED GY IP 250 OP 636 PS 637: Performed by: INTERNAL MEDICINE

## 2017-01-01 PROCEDURE — 73590 X-RAY EXAM OF LOWER LEG: CPT | Mod: RT

## 2017-01-01 PROCEDURE — 85027 COMPLETE CBC AUTOMATED: CPT | Performed by: ANESTHESIOLOGY

## 2017-01-01 PROCEDURE — 27211040 ZZH CONTINUOUS NEBULIZER MICRO PUMP

## 2017-01-01 PROCEDURE — 82533 TOTAL CORTISOL: CPT | Performed by: INTERNAL MEDICINE

## 2017-01-01 PROCEDURE — 73610 X-RAY EXAM OF ANKLE: CPT | Mod: RT

## 2017-01-01 PROCEDURE — 80053 COMPREHEN METABOLIC PANEL: CPT | Performed by: INTERNAL MEDICINE

## 2017-01-01 PROCEDURE — 87493 C DIFF AMPLIFIED PROBE: CPT | Performed by: INTERNAL MEDICINE

## 2017-01-01 PROCEDURE — 36415 COLL VENOUS BLD VENIPUNCTURE: CPT | Performed by: PHYSICIAN ASSISTANT

## 2017-01-01 PROCEDURE — 99233 SBSQ HOSP IP/OBS HIGH 50: CPT | Mod: 25 | Performed by: INTERNAL MEDICINE

## 2017-01-01 PROCEDURE — 96375 TX/PRO/DX INJ NEW DRUG ADDON: CPT

## 2017-01-01 PROCEDURE — 25000128 H RX IP 250 OP 636: Performed by: EMERGENCY MEDICINE

## 2017-01-01 PROCEDURE — 25000128 H RX IP 250 OP 636: Performed by: ANESTHESIOLOGY

## 2017-01-01 PROCEDURE — 80048 BASIC METABOLIC PNL TOTAL CA: CPT | Performed by: EMERGENCY MEDICINE

## 2017-01-01 PROCEDURE — 81001 URINALYSIS AUTO W/SCOPE: CPT | Performed by: PHYSICIAN ASSISTANT

## 2017-01-01 PROCEDURE — 5A1955Z RESPIRATORY VENTILATION, GREATER THAN 96 CONSECUTIVE HOURS: ICD-10-PCS | Performed by: INTERNAL MEDICINE

## 2017-01-01 PROCEDURE — 71010 XR CHEST 1 VW: CPT

## 2017-01-01 PROCEDURE — 93306 TTE W/DOPPLER COMPLETE: CPT

## 2017-01-01 PROCEDURE — 99207 ZZC APP CREDIT; MD BILLING SHARED VISIT: CPT | Performed by: PHYSICIAN ASSISTANT

## 2017-01-01 PROCEDURE — 86140 C-REACTIVE PROTEIN: CPT | Performed by: INTERNAL MEDICINE

## 2017-01-01 PROCEDURE — 87205 SMEAR GRAM STAIN: CPT | Performed by: INTERNAL MEDICINE

## 2017-01-01 PROCEDURE — 40000133 ZZH STATISTIC OT WARD VISIT: Performed by: REHABILITATION PRACTITIONER

## 2017-01-01 PROCEDURE — 84145 PROCALCITONIN (PCT): CPT | Performed by: INTERNAL MEDICINE

## 2017-01-01 PROCEDURE — 85027 COMPLETE CBC AUTOMATED: CPT | Performed by: PHYSICIAN ASSISTANT

## 2017-01-01 PROCEDURE — 84134 ASSAY OF PREALBUMIN: CPT | Performed by: INTERNAL MEDICINE

## 2017-01-01 PROCEDURE — 82043 UR ALBUMIN QUANTITATIVE: CPT | Performed by: INTERNAL MEDICINE

## 2017-01-01 PROCEDURE — 40000281 ZZH STATISTIC TRANSPORT TIME EA 15 MIN

## 2017-01-01 PROCEDURE — 99214 OFFICE O/P EST MOD 30 MIN: CPT | Performed by: FAMILY MEDICINE

## 2017-01-01 PROCEDURE — 93294 REM INTERROG EVL PM/LDLS PM: CPT | Performed by: INTERNAL MEDICINE

## 2017-01-01 PROCEDURE — 27210432 ZZH NUTRITION PRODUCT RENAL BASIC LITER

## 2017-01-01 PROCEDURE — 25000128 H RX IP 250 OP 636: Performed by: NURSE PRACTITIONER

## 2017-01-01 PROCEDURE — 87186 SC STD MICRODIL/AGAR DIL: CPT | Performed by: PHYSICIAN ASSISTANT

## 2017-01-01 PROCEDURE — 84484 ASSAY OF TROPONIN QUANT: CPT | Performed by: EMERGENCY MEDICINE

## 2017-01-01 PROCEDURE — 81001 URINALYSIS AUTO W/SCOPE: CPT | Performed by: INTERNAL MEDICINE

## 2017-01-01 PROCEDURE — 40000809 ZZH STATISTIC NO DOCUMENTATION TO SUPPORT CHARGE

## 2017-01-01 PROCEDURE — 87070 CULTURE OTHR SPECIMN AEROBIC: CPT | Performed by: INTERNAL MEDICINE

## 2017-01-01 PROCEDURE — 87633 RESP VIRUS 12-25 TARGETS: CPT | Performed by: INTERNAL MEDICINE

## 2017-01-01 PROCEDURE — 40000264 ECHO COMPLETE WITH LUMASON

## 2017-01-01 PROCEDURE — 97110 THERAPEUTIC EXERCISES: CPT | Mod: GO | Performed by: REHABILITATION PRACTITIONER

## 2017-01-01 PROCEDURE — 82803 BLOOD GASES ANY COMBINATION: CPT

## 2017-01-01 PROCEDURE — 99285 EMERGENCY DEPT VISIT HI MDM: CPT | Mod: 25

## 2017-01-01 PROCEDURE — 87088 URINE BACTERIA CULTURE: CPT | Performed by: PHYSICIAN ASSISTANT

## 2017-01-01 PROCEDURE — 83036 HEMOGLOBIN GLYCOSYLATED A1C: CPT | Performed by: INTERNAL MEDICINE

## 2017-01-01 PROCEDURE — 82565 ASSAY OF CREATININE: CPT

## 2017-01-01 PROCEDURE — 99214 OFFICE O/P EST MOD 30 MIN: CPT | Performed by: INTERNAL MEDICINE

## 2017-01-01 PROCEDURE — 99232 SBSQ HOSP IP/OBS MODERATE 35: CPT | Performed by: ANESTHESIOLOGY

## 2017-01-01 PROCEDURE — 99397 PER PM REEVAL EST PAT 65+ YR: CPT | Mod: 25 | Performed by: INTERNAL MEDICINE

## 2017-01-01 PROCEDURE — 25500064 ZZH RX 255 OP 636: Performed by: INTERNAL MEDICINE

## 2017-01-01 PROCEDURE — 85004 AUTOMATED DIFF WBC COUNT: CPT | Performed by: EMERGENCY MEDICINE

## 2017-01-01 PROCEDURE — 27210195 ZZH KIT POWER PICC DOUBLE LUMEN

## 2017-01-01 PROCEDURE — 80048 BASIC METABOLIC PNL TOTAL CA: CPT | Performed by: PHYSICIAN ASSISTANT

## 2017-01-01 PROCEDURE — 25000128 H RX IP 250 OP 636

## 2017-01-01 PROCEDURE — 94002 VENT MGMT INPAT INIT DAY: CPT

## 2017-01-01 PROCEDURE — 99213 OFFICE O/P EST LOW 20 MIN: CPT | Performed by: SURGERY

## 2017-01-01 PROCEDURE — 99223 1ST HOSP IP/OBS HIGH 75: CPT | Performed by: CLINICAL NURSE SPECIALIST

## 2017-01-01 PROCEDURE — 40000257 ZZH STATISTIC CONSULT NO CHARGE VASC ACCESS

## 2017-01-01 PROCEDURE — 99291 CRITICAL CARE FIRST HOUR: CPT | Performed by: SURGERY

## 2017-01-01 PROCEDURE — 87641 MR-STAPH DNA AMP PROBE: CPT | Performed by: INTERNAL MEDICINE

## 2017-01-01 PROCEDURE — 93308 TTE F-UP OR LMTD: CPT

## 2017-01-01 PROCEDURE — 25000125 ZZHC RX 250: Performed by: NURSE PRACTITIONER

## 2017-01-01 PROCEDURE — 94644 CONT INHLJ TX 1ST HOUR: CPT

## 2017-01-01 RX ORDER — LORAZEPAM 0.5 MG/1
0.25 TABLET ORAL EVERY 8 HOURS PRN
Status: DISCONTINUED | OUTPATIENT
Start: 2017-01-01 | End: 2017-01-01

## 2017-01-01 RX ORDER — NICOTINE POLACRILEX 4 MG
15-30 LOZENGE BUCCAL
Status: DISCONTINUED | OUTPATIENT
Start: 2017-01-01 | End: 2017-01-01 | Stop reason: HOSPADM

## 2017-01-01 RX ORDER — ONDANSETRON 4 MG/1
4 TABLET, ORALLY DISINTEGRATING ORAL EVERY 6 HOURS PRN
Status: DISCONTINUED | OUTPATIENT
Start: 2017-01-01 | End: 2017-01-01 | Stop reason: HOSPADM

## 2017-01-01 RX ORDER — TROSPIUM CHLORIDE ER 60 MG/1
60 CAPSULE ORAL EVERY MORNING
Qty: 90 EACH | Refills: 3 | Status: SHIPPED | OUTPATIENT
Start: 2017-01-01

## 2017-01-01 RX ORDER — ALENDRONATE SODIUM 70 MG/1
TABLET ORAL
Qty: 12 TABLET | Refills: 0 | Status: SHIPPED | OUTPATIENT
Start: 2017-01-01 | End: 2017-01-01

## 2017-01-01 RX ORDER — ACETAMINOPHEN 325 MG/1
650 TABLET ORAL EVERY 4 HOURS PRN
Status: DISCONTINUED | OUTPATIENT
Start: 2017-01-01 | End: 2017-01-01

## 2017-01-01 RX ORDER — TOLTERODINE TARTRATE 1 MG/1
1 TABLET, EXTENDED RELEASE ORAL 2 TIMES DAILY
Status: DISCONTINUED | OUTPATIENT
Start: 2017-01-01 | End: 2017-01-01

## 2017-01-01 RX ORDER — OLANZAPINE 5 MG/1
5 TABLET, ORALLY DISINTEGRATING ORAL 2 TIMES DAILY PRN
Status: DISCONTINUED | OUTPATIENT
Start: 2017-01-01 | End: 2017-01-01 | Stop reason: HOSPADM

## 2017-01-01 RX ORDER — TOLTERODINE 2 MG/1
4 CAPSULE, EXTENDED RELEASE ORAL EVERY MORNING
Status: DISCONTINUED | OUTPATIENT
Start: 2017-01-01 | End: 2017-01-01 | Stop reason: CLARIF

## 2017-01-01 RX ORDER — PROPOFOL 10 MG/ML
INJECTION, EMULSION INTRAVENOUS
Status: COMPLETED
Start: 2017-01-01 | End: 2017-01-01

## 2017-01-01 RX ORDER — FUROSEMIDE 10 MG/ML
20 INJECTION INTRAMUSCULAR; INTRAVENOUS ONCE
Status: COMPLETED | OUTPATIENT
Start: 2017-01-01 | End: 2017-01-01

## 2017-01-01 RX ORDER — POTASSIUM CHLORIDE 7.45 MG/ML
10 INJECTION INTRAVENOUS
Status: DISCONTINUED | OUTPATIENT
Start: 2017-01-01 | End: 2017-01-01

## 2017-01-01 RX ORDER — FENTANYL CITRATE 50 UG/ML
25-50 INJECTION, SOLUTION INTRAMUSCULAR; INTRAVENOUS
Status: DISCONTINUED | OUTPATIENT
Start: 2017-01-01 | End: 2017-01-01 | Stop reason: HOSPADM

## 2017-01-01 RX ORDER — PROPOFOL 10 MG/ML
5-75 INJECTION, EMULSION INTRAVENOUS CONTINUOUS
Status: DISCONTINUED | OUTPATIENT
Start: 2017-01-01 | End: 2017-01-01

## 2017-01-01 RX ORDER — FUROSEMIDE 10 MG/ML
40 INJECTION INTRAMUSCULAR; INTRAVENOUS ONCE
Status: COMPLETED | OUTPATIENT
Start: 2017-01-01 | End: 2017-01-01

## 2017-01-01 RX ORDER — CEFTRIAXONE SODIUM 2 G/50ML
2 INJECTION, SOLUTION INTRAVENOUS EVERY 24 HOURS
Status: DISCONTINUED | OUTPATIENT
Start: 2017-01-01 | End: 2017-01-01

## 2017-01-01 RX ORDER — ONDANSETRON 2 MG/ML
4 INJECTION INTRAMUSCULAR; INTRAVENOUS EVERY 6 HOURS PRN
Status: DISCONTINUED | OUTPATIENT
Start: 2017-01-01 | End: 2017-01-01 | Stop reason: HOSPADM

## 2017-01-01 RX ORDER — ALBUTEROL SULFATE 0.83 MG/ML
3 SOLUTION RESPIRATORY (INHALATION)
Status: DISCONTINUED | OUTPATIENT
Start: 2017-01-01 | End: 2017-01-01 | Stop reason: HOSPADM

## 2017-01-01 RX ORDER — GABAPENTIN 250 MG/5ML
300 SOLUTION ORAL AT BEDTIME
Status: DISCONTINUED | OUTPATIENT
Start: 2017-01-01 | End: 2017-01-01 | Stop reason: CLARIF

## 2017-01-01 RX ORDER — PROPOFOL 10 MG/ML
INJECTION, EMULSION INTRAVENOUS PRN
Status: DISCONTINUED | OUTPATIENT
Start: 2017-01-01 | End: 2017-01-01

## 2017-01-01 RX ORDER — AMOXICILLIN 250 MG
2 CAPSULE ORAL 2 TIMES DAILY PRN
Status: DISCONTINUED | OUTPATIENT
Start: 2017-01-01 | End: 2017-01-01

## 2017-01-01 RX ORDER — SIMVASTATIN 20 MG
40 TABLET ORAL AT BEDTIME
Status: DISCONTINUED | OUTPATIENT
Start: 2017-01-01 | End: 2017-01-01

## 2017-01-01 RX ORDER — LORAZEPAM 2 MG/ML
1 INJECTION INTRAMUSCULAR ONCE
Status: COMPLETED | OUTPATIENT
Start: 2017-01-01 | End: 2017-01-01

## 2017-01-01 RX ORDER — HYDROCHLOROTHIAZIDE 12.5 MG/1
12.5 TABLET ORAL DAILY
Qty: 90 TABLET | Refills: 1 | Status: SHIPPED | OUTPATIENT
Start: 2017-01-01

## 2017-01-01 RX ORDER — SIMVASTATIN 40 MG
40 TABLET ORAL AT BEDTIME
Qty: 90 TABLET | Refills: 0 | Status: CANCELLED | OUTPATIENT
Start: 2017-01-01

## 2017-01-01 RX ORDER — SODIUM CHLORIDE 9 MG/ML
INJECTION, SOLUTION INTRAVENOUS CONTINUOUS
Status: DISCONTINUED | OUTPATIENT
Start: 2017-01-01 | End: 2017-01-01 | Stop reason: HOSPADM

## 2017-01-01 RX ORDER — HALOPERIDOL 5 MG/ML
2 INJECTION INTRAMUSCULAR EVERY 6 HOURS PRN
Status: DISCONTINUED | OUTPATIENT
Start: 2017-01-01 | End: 2017-01-01 | Stop reason: HOSPADM

## 2017-01-01 RX ORDER — PROPOFOL 10 MG/ML
10-80 INJECTION, EMULSION INTRAVENOUS CONTINUOUS
Status: DISCONTINUED | OUTPATIENT
Start: 2017-01-01 | End: 2017-01-01

## 2017-01-01 RX ORDER — DEXTROSE MONOHYDRATE 25 G/50ML
25-50 INJECTION, SOLUTION INTRAVENOUS
Status: DISCONTINUED | OUTPATIENT
Start: 2017-01-01 | End: 2017-01-01

## 2017-01-01 RX ORDER — NALOXONE HYDROCHLORIDE 0.4 MG/ML
.1-.4 INJECTION, SOLUTION INTRAMUSCULAR; INTRAVENOUS; SUBCUTANEOUS
Status: DISCONTINUED | OUTPATIENT
Start: 2017-01-01 | End: 2017-01-01 | Stop reason: HOSPADM

## 2017-01-01 RX ORDER — MIDAZOLAM (PF) 1 MG/ML IN 0.9 % SODIUM CHLORIDE INTRAVENOUS SOLUTION
1-12 CONTINUOUS
Status: DISCONTINUED | OUTPATIENT
Start: 2017-01-01 | End: 2017-01-01 | Stop reason: HOSPADM

## 2017-01-01 RX ORDER — NICOTINE POLACRILEX 4 MG
15-30 LOZENGE BUCCAL
Status: DISCONTINUED | OUTPATIENT
Start: 2017-01-01 | End: 2017-01-01

## 2017-01-01 RX ORDER — POTASSIUM CHLORIDE 29.8 MG/ML
20 INJECTION INTRAVENOUS
Status: DISCONTINUED | OUTPATIENT
Start: 2017-01-01 | End: 2017-01-01

## 2017-01-01 RX ORDER — POTASSIUM CL/LIDO/0.9 % NACL 10MEQ/0.1L
10 INTRAVENOUS SOLUTION, PIGGYBACK (ML) INTRAVENOUS
Status: DISCONTINUED | OUTPATIENT
Start: 2017-01-01 | End: 2017-01-01

## 2017-01-01 RX ORDER — NALOXONE HYDROCHLORIDE 0.4 MG/ML
.1-.4 INJECTION, SOLUTION INTRAMUSCULAR; INTRAVENOUS; SUBCUTANEOUS
Status: DISCONTINUED | OUTPATIENT
Start: 2017-01-01 | End: 2017-01-01

## 2017-01-01 RX ORDER — AMANTADINE HYDROCHLORIDE 100 MG/1
TABLET ORAL
Qty: 270 TABLET | Refills: 2
Start: 2017-01-01

## 2017-01-01 RX ORDER — LORAZEPAM 2 MG/ML
.5-1 INJECTION INTRAMUSCULAR EVERY 4 HOURS PRN
Status: DISCONTINUED | OUTPATIENT
Start: 2017-01-01 | End: 2017-01-01

## 2017-01-01 RX ORDER — GABAPENTIN 300 MG/1
300 CAPSULE ORAL AT BEDTIME
Status: DISCONTINUED | OUTPATIENT
Start: 2017-01-01 | End: 2017-01-01

## 2017-01-01 RX ORDER — METOPROLOL TARTRATE 25 MG/1
25 TABLET, FILM COATED ORAL 2 TIMES DAILY
Status: DISCONTINUED | OUTPATIENT
Start: 2017-01-01 | End: 2017-01-01

## 2017-01-01 RX ORDER — AMOXICILLIN 250 MG
1 CAPSULE ORAL 2 TIMES DAILY PRN
Status: DISCONTINUED | OUTPATIENT
Start: 2017-01-01 | End: 2017-01-01

## 2017-01-01 RX ORDER — AMANTADINE HYDROCHLORIDE 100 MG/1
100 CAPSULE, GELATIN COATED ORAL EVERY MORNING
Status: DISCONTINUED | OUTPATIENT
Start: 2017-01-01 | End: 2017-01-01

## 2017-01-01 RX ORDER — SIMVASTATIN 40 MG
TABLET ORAL
Qty: 90 TABLET | Refills: 0 | Status: SHIPPED | OUTPATIENT
Start: 2017-01-01

## 2017-01-01 RX ORDER — NIFEDIPINE 30 MG/1
60 TABLET, EXTENDED RELEASE ORAL
Status: DISCONTINUED | OUTPATIENT
Start: 2017-01-01 | End: 2017-01-01 | Stop reason: CLARIF

## 2017-01-01 RX ORDER — IPRATROPIUM BROMIDE AND ALBUTEROL SULFATE 2.5; .5 MG/3ML; MG/3ML
3 SOLUTION RESPIRATORY (INHALATION) 4 TIMES DAILY
Status: DISCONTINUED | OUTPATIENT
Start: 2017-01-01 | End: 2017-01-01 | Stop reason: HOSPADM

## 2017-01-01 RX ORDER — POTASSIUM CHLORIDE 1.5 G/1.58G
20-40 POWDER, FOR SOLUTION ORAL
Status: DISCONTINUED | OUTPATIENT
Start: 2017-01-01 | End: 2017-01-01

## 2017-01-01 RX ORDER — MIDAZOLAM (PF) 1 MG/ML IN 0.9 % SODIUM CHLORIDE INTRAVENOUS SOLUTION
5-10 CONTINUOUS
Status: DISCONTINUED | OUTPATIENT
Start: 2017-01-01 | End: 2017-01-01

## 2017-01-01 RX ORDER — HYDROMORPHONE HCL/0.9% NACL/PF 0.2MG/0.2
0.2 SYRINGE (ML) INTRAVENOUS
Status: DISCONTINUED | OUTPATIENT
Start: 2017-01-01 | End: 2017-01-01 | Stop reason: HOSPADM

## 2017-01-01 RX ORDER — ALENDRONATE SODIUM 70 MG/1
70 TABLET ORAL
Qty: 12 TABLET | Refills: 3 | Status: CANCELLED | OUTPATIENT
Start: 2017-01-01

## 2017-01-01 RX ORDER — VECURONIUM BROMIDE 1 MG/ML
10 INJECTION, POWDER, LYOPHILIZED, FOR SOLUTION INTRAVENOUS ONCE
Status: COMPLETED | OUTPATIENT
Start: 2017-01-01 | End: 2017-01-01

## 2017-01-01 RX ORDER — HYDROMORPHONE HYDROCHLORIDE 10 MG/ML
.3-.5 INJECTION INTRAMUSCULAR; INTRAVENOUS; SUBCUTANEOUS EVERY 12 HOURS PRN
Status: DISCONTINUED | OUTPATIENT
Start: 2017-01-01 | End: 2017-01-01

## 2017-01-01 RX ORDER — NIFEDIPINE 60 MG/1
TABLET, EXTENDED RELEASE ORAL
Qty: 90 TABLET | Refills: 1 | Status: SHIPPED | OUTPATIENT
Start: 2017-01-01 | End: 2017-01-01

## 2017-01-01 RX ORDER — ACETAMINOPHEN 650 MG/1
650 SUPPOSITORY RECTAL EVERY 6 HOURS PRN
Status: DISCONTINUED | OUTPATIENT
Start: 2017-01-01 | End: 2017-01-01 | Stop reason: HOSPADM

## 2017-01-01 RX ORDER — SIMVASTATIN 40 MG
40 TABLET ORAL AT BEDTIME
Qty: 90 TABLET | Refills: 2 | Status: SHIPPED | OUTPATIENT
Start: 2017-01-01 | End: 2017-01-01

## 2017-01-01 RX ORDER — IOPAMIDOL 755 MG/ML
500 INJECTION, SOLUTION INTRAVASCULAR ONCE
Status: COMPLETED | OUTPATIENT
Start: 2017-01-01 | End: 2017-01-01

## 2017-01-01 RX ORDER — EPOPROSTENOL SODIUM 0.5 MG/1
INJECTION, POWDER, LYOPHILIZED, FOR SOLUTION INTRAVENOUS EVERY 8 HOURS
Status: DISCONTINUED | OUTPATIENT
Start: 2017-01-01 | End: 2017-01-01

## 2017-01-01 RX ORDER — FUROSEMIDE 10 MG/ML
20 INJECTION INTRAMUSCULAR; INTRAVENOUS
Status: DISCONTINUED | OUTPATIENT
Start: 2017-01-01 | End: 2017-01-01

## 2017-01-01 RX ORDER — CHLORHEXIDINE GLUCONATE ORAL RINSE 1.2 MG/ML
15 SOLUTION DENTAL 2 TIMES DAILY
Status: DISCONTINUED | OUTPATIENT
Start: 2017-01-01 | End: 2017-01-01 | Stop reason: HOSPADM

## 2017-01-01 RX ORDER — GUAR GUM
1 PACKET (EA) ORAL 4 TIMES DAILY
Status: DISCONTINUED | OUTPATIENT
Start: 2017-01-01 | End: 2017-01-01 | Stop reason: CLARIF

## 2017-01-01 RX ORDER — GLYCOPYRROLATE 0.2 MG/ML
.1-.2 INJECTION, SOLUTION INTRAMUSCULAR; INTRAVENOUS EVERY 4 HOURS PRN
Status: DISCONTINUED | OUTPATIENT
Start: 2017-01-01 | End: 2017-01-01 | Stop reason: HOSPADM

## 2017-01-01 RX ORDER — DEXTROSE MONOHYDRATE 25 G/50ML
25-50 INJECTION, SOLUTION INTRAVENOUS
Status: DISCONTINUED | OUTPATIENT
Start: 2017-01-01 | End: 2017-01-01 | Stop reason: HOSPADM

## 2017-01-01 RX ORDER — SULFAMETHOXAZOLE/TRIMETHOPRIM 800-160 MG
1 TABLET ORAL 2 TIMES DAILY
Qty: 14 TABLET | Refills: 0 | Status: SHIPPED | OUTPATIENT
Start: 2017-01-01 | End: 2017-01-01

## 2017-01-01 RX ORDER — TOLTERODINE TARTRATE 1 MG/1
2 TABLET, EXTENDED RELEASE ORAL 2 TIMES DAILY
Status: DISCONTINUED | OUTPATIENT
Start: 2017-01-01 | End: 2017-01-01

## 2017-01-01 RX ORDER — CEFTRIAXONE 2 G/1
2 INJECTION, POWDER, FOR SOLUTION INTRAMUSCULAR; INTRAVENOUS ONCE
Status: COMPLETED | OUTPATIENT
Start: 2017-01-01 | End: 2017-01-01

## 2017-01-01 RX ORDER — HYDROMORPHONE HYDROCHLORIDE 10 MG/ML
.3-.5 INJECTION INTRAMUSCULAR; INTRAVENOUS; SUBCUTANEOUS
Status: DISCONTINUED | OUTPATIENT
Start: 2017-01-01 | End: 2017-01-01 | Stop reason: HOSPADM

## 2017-01-01 RX ORDER — FUROSEMIDE 10 MG/ML
20 INJECTION INTRAMUSCULAR; INTRAVENOUS
Status: COMPLETED | OUTPATIENT
Start: 2017-01-01 | End: 2017-01-01

## 2017-01-01 RX ORDER — TETRAHYDROZOLINE HCL 0.05 %
1-2 DROPS OPHTHALMIC (EYE) 4 TIMES DAILY PRN
Status: DISCONTINUED | OUTPATIENT
Start: 2017-01-01 | End: 2017-01-01 | Stop reason: CLARIF

## 2017-01-01 RX ORDER — AZITHROMYCIN 250 MG/1
250 TABLET, FILM COATED ORAL DAILY
Status: COMPLETED | OUTPATIENT
Start: 2017-01-01 | End: 2017-01-01

## 2017-01-01 RX ORDER — LORAZEPAM 2 MG/ML
1-2 INJECTION INTRAMUSCULAR EVERY 4 HOURS PRN
Status: DISCONTINUED | OUTPATIENT
Start: 2017-01-01 | End: 2017-01-01

## 2017-01-01 RX ORDER — NITROFURANTOIN 25; 75 MG/1; MG/1
100 CAPSULE ORAL 2 TIMES DAILY
Qty: 14 CAPSULE | Refills: 0 | Status: SHIPPED | OUTPATIENT
Start: 2017-01-01 | End: 2017-01-01

## 2017-01-01 RX ORDER — POTASSIUM CHLORIDE 1500 MG/1
20-40 TABLET, EXTENDED RELEASE ORAL
Status: DISCONTINUED | OUTPATIENT
Start: 2017-01-01 | End: 2017-01-01

## 2017-01-01 RX ORDER — SODIUM CHLORIDE 9 MG/ML
INJECTION, SOLUTION INTRAVENOUS CONTINUOUS
Status: DISCONTINUED | OUTPATIENT
Start: 2017-01-01 | End: 2017-01-01

## 2017-01-01 RX ORDER — NIFEDIPINE 30 MG/1
60 TABLET, EXTENDED RELEASE ORAL EVERY MORNING
Status: DISCONTINUED | OUTPATIENT
Start: 2017-01-01 | End: 2017-01-01

## 2017-01-01 RX ORDER — LIDOCAINE 40 MG/G
CREAM TOPICAL
Status: DISCONTINUED | OUTPATIENT
Start: 2017-01-01 | End: 2017-01-01 | Stop reason: HOSPADM

## 2017-01-01 RX ORDER — METOPROLOL TARTRATE 25 MG/1
25 TABLET, FILM COATED ORAL 2 TIMES DAILY
Qty: 180 TABLET | Refills: 3 | Status: SHIPPED | OUTPATIENT
Start: 2017-01-01

## 2017-01-01 RX ORDER — BISACODYL 10 MG
10 SUPPOSITORY, RECTAL RECTAL DAILY PRN
Status: DISCONTINUED | OUTPATIENT
Start: 2017-01-01 | End: 2017-01-01 | Stop reason: HOSPADM

## 2017-01-01 RX ORDER — GABAPENTIN 300 MG/1
300 CAPSULE ORAL AT BEDTIME
Qty: 90 CAPSULE | Refills: 3 | Status: SHIPPED | OUTPATIENT
Start: 2017-01-01

## 2017-01-01 RX ORDER — PROPOFOL 10 MG/ML
5-10 INJECTION, EMULSION INTRAVENOUS CONTINUOUS
Status: DISCONTINUED | OUTPATIENT
Start: 2017-01-01 | End: 2017-01-01 | Stop reason: HOSPADM

## 2017-01-01 RX ORDER — LORAZEPAM 2 MG/ML
1-2 INJECTION INTRAMUSCULAR
Status: DISCONTINUED | OUTPATIENT
Start: 2017-01-01 | End: 2017-01-01 | Stop reason: HOSPADM

## 2017-01-01 RX ADMIN — APIXABAN 2.5 MG: 2.5 TABLET, FILM COATED ORAL at 10:33

## 2017-01-01 RX ADMIN — INSULIN ASPART 1 UNITS: 100 INJECTION, SOLUTION INTRAVENOUS; SUBCUTANEOUS at 00:13

## 2017-01-01 RX ADMIN — IPRATROPIUM BROMIDE AND ALBUTEROL SULFATE 3 ML: .5; 3 SOLUTION RESPIRATORY (INHALATION) at 19:18

## 2017-01-01 RX ADMIN — AMANTADINE HYDROCHLORIDE 100 MG: 50 SOLUTION ORAL at 07:39

## 2017-01-01 RX ADMIN — AMANTADINE HYDROCHLORIDE 100 MG: 50 SOLUTION ORAL at 09:57

## 2017-01-01 RX ADMIN — Medication 9 MG/HR: at 21:49

## 2017-01-01 RX ADMIN — IPRATROPIUM BROMIDE AND ALBUTEROL SULFATE 3 ML: .5; 3 SOLUTION RESPIRATORY (INHALATION) at 08:50

## 2017-01-01 RX ADMIN — TAZOBACTAM SODIUM AND PIPERACILLIN SODIUM 3.38 G: 375; 3 INJECTION, SOLUTION INTRAVENOUS at 21:08

## 2017-01-01 RX ADMIN — TAZOBACTAM SODIUM AND PIPERACILLIN SODIUM 3.38 G: 375; 3 INJECTION, SOLUTION INTRAVENOUS at 11:44

## 2017-01-01 RX ADMIN — IPRATROPIUM BROMIDE AND ALBUTEROL SULFATE 3 ML: .5; 3 SOLUTION RESPIRATORY (INHALATION) at 19:28

## 2017-01-01 RX ADMIN — PROPOFOL 50 MCG/KG/MIN: 10 INJECTION, EMULSION INTRAVENOUS at 02:24

## 2017-01-01 RX ADMIN — IPRATROPIUM BROMIDE AND ALBUTEROL SULFATE 3 ML: .5; 3 SOLUTION RESPIRATORY (INHALATION) at 07:29

## 2017-01-01 RX ADMIN — IPRATROPIUM BROMIDE AND ALBUTEROL SULFATE 3 ML: .5; 3 SOLUTION RESPIRATORY (INHALATION) at 19:27

## 2017-01-01 RX ADMIN — APIXABAN 2.5 MG: 2.5 TABLET, FILM COATED ORAL at 20:32

## 2017-01-01 RX ADMIN — DEXMEDETOMIDINE HYDROCHLORIDE 0.7 MCG/KG/HR: 100 INJECTION, SOLUTION INTRAVENOUS at 08:07

## 2017-01-01 RX ADMIN — TAZOBACTAM SODIUM AND PIPERACILLIN SODIUM 2.25 G: 250; 2 INJECTION, SOLUTION INTRAVENOUS at 17:21

## 2017-01-01 RX ADMIN — Medication 1 PACKET: at 22:28

## 2017-01-01 RX ADMIN — FUROSEMIDE 20 MG: 10 INJECTION, SOLUTION INTRAVENOUS at 03:19

## 2017-01-01 RX ADMIN — DEXMEDETOMIDINE HYDROCHLORIDE 0.7 MCG/KG/HR: 100 INJECTION, SOLUTION INTRAVENOUS at 14:32

## 2017-01-01 RX ADMIN — Medication 12.5 MG: at 18:49

## 2017-01-01 RX ADMIN — TAZOBACTAM SODIUM AND PIPERACILLIN SODIUM 3.38 G: 375; 3 INJECTION, SOLUTION INTRAVENOUS at 18:14

## 2017-01-01 RX ADMIN — TOLTERODINE TARTRATE 1 MG: 1 TABLET, FILM COATED ORAL at 08:51

## 2017-01-01 RX ADMIN — TAZOBACTAM SODIUM AND PIPERACILLIN SODIUM 3.38 G: 375; 3 INJECTION, SOLUTION INTRAVENOUS at 19:14

## 2017-01-01 RX ADMIN — TAZOBACTAM SODIUM AND PIPERACILLIN SODIUM 3.38 G: 375; 3 INJECTION, SOLUTION INTRAVENOUS at 20:48

## 2017-01-01 RX ADMIN — SODIUM CHLORIDE 4 UNITS/HR: 9 INJECTION, SOLUTION INTRAVENOUS at 20:57

## 2017-01-01 RX ADMIN — VECURONIUM BROMIDE 0.8 MCG/KG/MIN: 1 INJECTION, POWDER, LYOPHILIZED, FOR SOLUTION INTRAVENOUS at 21:09

## 2017-01-01 RX ADMIN — IPRATROPIUM BROMIDE AND ALBUTEROL SULFATE 3 ML: .5; 3 SOLUTION RESPIRATORY (INHALATION) at 19:41

## 2017-01-01 RX ADMIN — Medication 1 PACKET: at 07:40

## 2017-01-01 RX ADMIN — WATER 20 NG/KG/MIN: 1 SOLUTION INTRAVENOUS at 10:24

## 2017-01-01 RX ADMIN — TOLTERODINE TARTRATE 4 MG: 2 CAPSULE, EXTENDED RELEASE ORAL at 08:11

## 2017-01-01 RX ADMIN — IPRATROPIUM BROMIDE AND ALBUTEROL SULFATE 3 ML: .5; 3 SOLUTION RESPIRATORY (INHALATION) at 11:31

## 2017-01-01 RX ADMIN — POTASSIUM CHLORIDE 20 MEQ: 1.5 POWDER, FOR SOLUTION ORAL at 20:32

## 2017-01-01 RX ADMIN — SIMVASTATIN 40 MG: 20 TABLET, FILM COATED ORAL at 21:49

## 2017-01-01 RX ADMIN — PROPOFOL 25 MCG/KG/MIN: 10 INJECTION, EMULSION INTRAVENOUS at 08:06

## 2017-01-01 RX ADMIN — EPOPROSTENOL SODIUM: 0.5 INJECTION, POWDER, LYOPHILIZED, FOR SOLUTION INTRAVENOUS at 10:17

## 2017-01-01 RX ADMIN — TAZOBACTAM SODIUM AND PIPERACILLIN SODIUM 3.38 G: 375; 3 INJECTION, SOLUTION INTRAVENOUS at 22:10

## 2017-01-01 RX ADMIN — AMANTADINE HYDROCHLORIDE 100 MG: 100 CAPSULE ORAL at 08:08

## 2017-01-01 RX ADMIN — APIXABAN 2.5 MG: 2.5 TABLET, FILM COATED ORAL at 08:28

## 2017-01-01 RX ADMIN — TAZOBACTAM SODIUM AND PIPERACILLIN SODIUM 2.25 G: 250; 2 INJECTION, SOLUTION INTRAVENOUS at 10:59

## 2017-01-01 RX ADMIN — CHLORHEXIDINE GLUCONATE 15 ML: 1.2 RINSE ORAL at 21:09

## 2017-01-01 RX ADMIN — TAZOBACTAM SODIUM AND PIPERACILLIN SODIUM 3.38 G: 375; 3 INJECTION, SOLUTION INTRAVENOUS at 14:42

## 2017-01-01 RX ADMIN — TAZOBACTAM SODIUM AND PIPERACILLIN SODIUM 3.38 G: 375; 3 INJECTION, SOLUTION INTRAVENOUS at 21:06

## 2017-01-01 RX ADMIN — Medication 150 MCG/HR: at 00:14

## 2017-01-01 RX ADMIN — SIMVASTATIN 40 MG: 20 TABLET, FILM COATED ORAL at 21:08

## 2017-01-01 RX ADMIN — IPRATROPIUM BROMIDE AND ALBUTEROL SULFATE 3 ML: .5; 3 SOLUTION RESPIRATORY (INHALATION) at 16:01

## 2017-01-01 RX ADMIN — PROPOFOL 50 MCG/KG/MIN: 10 INJECTION, EMULSION INTRAVENOUS at 22:17

## 2017-01-01 RX ADMIN — FENTANYL CITRATE 150 MCG/HR: 50 INJECTION, SOLUTION INTRAMUSCULAR; INTRAVENOUS at 05:58

## 2017-01-01 RX ADMIN — APIXABAN 2.5 MG: 2.5 TABLET, FILM COATED ORAL at 21:55

## 2017-01-01 RX ADMIN — TAZOBACTAM SODIUM AND PIPERACILLIN SODIUM 3.38 G: 375; 3 INJECTION, SOLUTION INTRAVENOUS at 11:35

## 2017-01-01 RX ADMIN — TAZOBACTAM SODIUM AND PIPERACILLIN SODIUM 2.25 G: 250; 2 INJECTION, SOLUTION INTRAVENOUS at 04:47

## 2017-01-01 RX ADMIN — SULFUR HEXAFLUORIDE 2 ML: KIT at 15:15

## 2017-01-01 RX ADMIN — IPRATROPIUM BROMIDE AND ALBUTEROL SULFATE 3 ML: .5; 3 SOLUTION RESPIRATORY (INHALATION) at 15:54

## 2017-01-01 RX ADMIN — SODIUM CHLORIDE 500 ML: 900 INJECTION, SOLUTION INTRAVENOUS at 11:36

## 2017-01-01 RX ADMIN — CHLORHEXIDINE GLUCONATE 15 ML: 1.2 RINSE ORAL at 20:47

## 2017-01-01 RX ADMIN — IPRATROPIUM BROMIDE AND ALBUTEROL SULFATE 3 ML: .5; 3 SOLUTION RESPIRATORY (INHALATION) at 11:52

## 2017-01-01 RX ADMIN — PROPOFOL 10 MCG/KG/MIN: 10 INJECTION, EMULSION INTRAVENOUS at 20:00

## 2017-01-01 RX ADMIN — MULTIVITAMIN 15 ML: LIQUID ORAL at 08:30

## 2017-01-01 RX ADMIN — EPOPROSTENOL SODIUM: 0.5 INJECTION, POWDER, LYOPHILIZED, FOR SOLUTION INTRAVENOUS at 10:24

## 2017-01-01 RX ADMIN — IPRATROPIUM BROMIDE AND ALBUTEROL SULFATE 3 ML: .5; 3 SOLUTION RESPIRATORY (INHALATION) at 15:39

## 2017-01-01 RX ADMIN — APIXABAN 2.5 MG: 2.5 TABLET, FILM COATED ORAL at 21:21

## 2017-01-01 RX ADMIN — FENTANYL CITRATE 50 MCG: 50 INJECTION, SOLUTION INTRAMUSCULAR; INTRAVENOUS at 23:54

## 2017-01-01 RX ADMIN — IPRATROPIUM BROMIDE AND ALBUTEROL SULFATE 3 ML: .5; 3 SOLUTION RESPIRATORY (INHALATION) at 08:25

## 2017-01-01 RX ADMIN — APIXABAN 2.5 MG: 2.5 TABLET, FILM COATED ORAL at 21:06

## 2017-01-01 RX ADMIN — AZITHROMYCIN 250 MG: 250 TABLET, FILM COATED ORAL at 09:18

## 2017-01-01 RX ADMIN — AZITHROMYCIN 250 MG: 250 TABLET, FILM COATED ORAL at 08:10

## 2017-01-01 RX ADMIN — TAZOBACTAM SODIUM AND PIPERACILLIN SODIUM 3.38 G: 375; 3 INJECTION, SOLUTION INTRAVENOUS at 04:52

## 2017-01-01 RX ADMIN — Medication 7 ML: at 07:39

## 2017-01-01 RX ADMIN — APIXABAN 2.5 MG: 2.5 TABLET, FILM COATED ORAL at 09:18

## 2017-01-01 RX ADMIN — WATER 20 NG/KG/MIN: 1 SOLUTION INTRAVENOUS at 03:34

## 2017-01-01 RX ADMIN — TOLTERODINE TARTRATE 1 MG: 1 TABLET, FILM COATED ORAL at 20:32

## 2017-01-01 RX ADMIN — Medication 8 MG/HR: at 21:09

## 2017-01-01 RX ADMIN — CHLORHEXIDINE GLUCONATE 15 ML: 1.2 RINSE ORAL at 20:32

## 2017-01-01 RX ADMIN — DEXMEDETOMIDINE HYDROCHLORIDE 0.4 MCG/KG/HR: 100 INJECTION, SOLUTION INTRAVENOUS at 01:24

## 2017-01-01 RX ADMIN — METOPROLOL TARTRATE 25 MG: 25 TABLET ORAL at 08:10

## 2017-01-01 RX ADMIN — PROPOFOL 50 MCG/KG/MIN: 10 INJECTION, EMULSION INTRAVENOUS at 18:27

## 2017-01-01 RX ADMIN — SODIUM CHLORIDE: 9 INJECTION, SOLUTION INTRAVENOUS at 17:14

## 2017-01-01 RX ADMIN — TOLTERODINE TARTRATE 4 MG: 2 CAPSULE, EXTENDED RELEASE ORAL at 08:08

## 2017-01-01 RX ADMIN — TAZOBACTAM SODIUM AND PIPERACILLIN SODIUM 3.38 G: 375; 3 INJECTION, SOLUTION INTRAVENOUS at 10:33

## 2017-01-01 RX ADMIN — TAZOBACTAM SODIUM AND PIPERACILLIN SODIUM 3.38 G: 375; 3 INJECTION, SOLUTION INTRAVENOUS at 05:03

## 2017-01-01 RX ADMIN — INSULIN GLARGINE 35 UNITS: 100 INJECTION, SOLUTION SUBCUTANEOUS at 08:59

## 2017-01-01 RX ADMIN — Medication 7 ML: at 09:55

## 2017-01-01 RX ADMIN — MICONAZOLE NITRATE: 2 POWDER TOPICAL at 20:33

## 2017-01-01 RX ADMIN — IPRATROPIUM BROMIDE AND ALBUTEROL SULFATE 3 ML: .5; 3 SOLUTION RESPIRATORY (INHALATION) at 11:00

## 2017-01-01 RX ADMIN — TAZOBACTAM SODIUM AND PIPERACILLIN SODIUM 3.38 G: 375; 3 INJECTION, SOLUTION INTRAVENOUS at 08:00

## 2017-01-01 RX ADMIN — MIDAZOLAM 0.5 MG: 1 INJECTION INTRAMUSCULAR; INTRAVENOUS at 16:36

## 2017-01-01 RX ADMIN — APIXABAN 2.5 MG: 2.5 TABLET, FILM COATED ORAL at 22:12

## 2017-01-01 RX ADMIN — INSULIN ASPART 1 UNITS: 100 INJECTION, SOLUTION INTRAVENOUS; SUBCUTANEOUS at 16:00

## 2017-01-01 RX ADMIN — METOPROLOL TARTRATE 25 MG: 25 TABLET ORAL at 21:50

## 2017-01-01 RX ADMIN — LORAZEPAM 1 MG: 2 INJECTION, SOLUTION INTRAMUSCULAR; INTRAVENOUS at 05:09

## 2017-01-01 RX ADMIN — MIDAZOLAM 2 MG: 1 INJECTION INTRAMUSCULAR; INTRAVENOUS at 20:19

## 2017-01-01 RX ADMIN — IPRATROPIUM BROMIDE AND ALBUTEROL SULFATE 3 ML: .5; 3 SOLUTION RESPIRATORY (INHALATION) at 07:16

## 2017-01-01 RX ADMIN — IPRATROPIUM BROMIDE AND ALBUTEROL SULFATE 3 ML: .5; 3 SOLUTION RESPIRATORY (INHALATION) at 19:47

## 2017-01-01 RX ADMIN — MICONAZOLE NITRATE: 2 POWDER TOPICAL at 20:47

## 2017-01-01 RX ADMIN — APIXABAN 2.5 MG: 2.5 TABLET, FILM COATED ORAL at 11:44

## 2017-01-01 RX ADMIN — Medication 40 MG: at 08:00

## 2017-01-01 RX ADMIN — APIXABAN 2.5 MG: 2.5 TABLET, FILM COATED ORAL at 09:31

## 2017-01-01 RX ADMIN — INSULIN ASPART 1 UNITS: 100 INJECTION, SOLUTION INTRAVENOUS; SUBCUTANEOUS at 01:55

## 2017-01-01 RX ADMIN — IPRATROPIUM BROMIDE AND ALBUTEROL SULFATE 3 ML: .5; 3 SOLUTION RESPIRATORY (INHALATION) at 16:04

## 2017-01-01 RX ADMIN — CEFTRIAXONE 2 G: 2 INJECTION, POWDER, FOR SOLUTION INTRAMUSCULAR; INTRAVENOUS at 11:39

## 2017-01-01 RX ADMIN — IPRATROPIUM BROMIDE AND ALBUTEROL SULFATE 3 ML: .5; 3 SOLUTION RESPIRATORY (INHALATION) at 15:33

## 2017-01-01 RX ADMIN — INSULIN ASPART 2 UNITS: 100 INJECTION, SOLUTION INTRAVENOUS; SUBCUTANEOUS at 19:59

## 2017-01-01 RX ADMIN — WATER 20 NG/KG/MIN: 1 SOLUTION INTRAVENOUS at 19:16

## 2017-01-01 RX ADMIN — POTASSIUM CHLORIDE 40 MEQ: 1.5 POWDER, FOR SOLUTION ORAL at 07:35

## 2017-01-01 RX ADMIN — Medication 1 PACKET: at 17:17

## 2017-01-01 RX ADMIN — IPRATROPIUM BROMIDE AND ALBUTEROL SULFATE 3 ML: .5; 3 SOLUTION RESPIRATORY (INHALATION) at 08:19

## 2017-01-01 RX ADMIN — INSULIN ASPART 1 UNITS: 100 INJECTION, SOLUTION INTRAVENOUS; SUBCUTANEOUS at 00:10

## 2017-01-01 RX ADMIN — INSULIN ASPART 4 UNITS: 100 INJECTION, SOLUTION INTRAVENOUS; SUBCUTANEOUS at 16:09

## 2017-01-01 RX ADMIN — TAZOBACTAM SODIUM AND PIPERACILLIN SODIUM 3.38 G: 375; 3 INJECTION, SOLUTION INTRAVENOUS at 00:07

## 2017-01-01 RX ADMIN — DEXMEDETOMIDINE HYDROCHLORIDE 1 MCG/KG/HR: 100 INJECTION, SOLUTION INTRAVENOUS at 11:19

## 2017-01-01 RX ADMIN — INSULIN ASPART 4 UNITS: 100 INJECTION, SOLUTION INTRAVENOUS; SUBCUTANEOUS at 03:37

## 2017-01-01 RX ADMIN — DEXMEDETOMIDINE HYDROCHLORIDE 0.7 MCG/KG/HR: 100 INJECTION, SOLUTION INTRAVENOUS at 15:18

## 2017-01-01 RX ADMIN — APIXABAN 2.5 MG: 2.5 TABLET, FILM COATED ORAL at 08:09

## 2017-01-01 RX ADMIN — TAZOBACTAM SODIUM AND PIPERACILLIN SODIUM 3.38 G: 375; 3 INJECTION, SOLUTION INTRAVENOUS at 04:56

## 2017-01-01 RX ADMIN — AMANTADINE HYDROCHLORIDE 100 MG: 100 CAPSULE ORAL at 08:49

## 2017-01-01 RX ADMIN — Medication 100 MCG/HR: at 07:06

## 2017-01-01 RX ADMIN — AZITHROMYCIN 250 MG: 250 TABLET, FILM COATED ORAL at 08:49

## 2017-01-01 RX ADMIN — Medication 100 MCG/HR: at 11:48

## 2017-01-01 RX ADMIN — TAZOBACTAM SODIUM AND PIPERACILLIN SODIUM 3.38 G: 375; 3 INJECTION, SOLUTION INTRAVENOUS at 17:05

## 2017-01-01 RX ADMIN — SODIUM CHLORIDE 4 UNITS/HR: 9 INJECTION, SOLUTION INTRAVENOUS at 08:14

## 2017-01-01 RX ADMIN — POTASSIUM CHLORIDE 40 MEQ: 1.5 POWDER, FOR SOLUTION ORAL at 18:14

## 2017-01-01 RX ADMIN — APIXABAN 2.5 MG: 2.5 TABLET, FILM COATED ORAL at 20:48

## 2017-01-01 RX ADMIN — WATER 20 NG/KG/MIN: 1 SOLUTION INTRAVENOUS at 19:22

## 2017-01-01 RX ADMIN — TAZOBACTAM SODIUM AND PIPERACILLIN SODIUM 3.38 G: 375; 3 INJECTION, SOLUTION INTRAVENOUS at 22:30

## 2017-01-01 RX ADMIN — TAZOBACTAM SODIUM AND PIPERACILLIN SODIUM 3.38 G: 375; 3 INJECTION, SOLUTION INTRAVENOUS at 15:47

## 2017-01-01 RX ADMIN — DEXMEDETOMIDINE HYDROCHLORIDE 0.7 MCG/KG/HR: 100 INJECTION, SOLUTION INTRAVENOUS at 23:02

## 2017-01-01 RX ADMIN — DEXMEDETOMIDINE HYDROCHLORIDE 1 MCG/KG/HR: 100 INJECTION, SOLUTION INTRAVENOUS at 00:17

## 2017-01-01 RX ADMIN — METOPROLOL TARTRATE 25 MG: 25 TABLET ORAL at 08:49

## 2017-01-01 RX ADMIN — SIMVASTATIN 40 MG: 20 TABLET, FILM COATED ORAL at 21:06

## 2017-01-01 RX ADMIN — TOLTERODINE TARTRATE 1 MG: 1 TABLET, FILM COATED ORAL at 21:54

## 2017-01-01 RX ADMIN — CHLORHEXIDINE GLUCONATE 15 ML: 1.2 RINSE ORAL at 08:00

## 2017-01-01 RX ADMIN — IPRATROPIUM BROMIDE AND ALBUTEROL SULFATE 3 ML: .5; 3 SOLUTION RESPIRATORY (INHALATION) at 07:33

## 2017-01-01 RX ADMIN — DEXMEDETOMIDINE HYDROCHLORIDE 0.2 MCG/KG/HR: 100 INJECTION, SOLUTION INTRAVENOUS at 06:54

## 2017-01-01 RX ADMIN — TAZOBACTAM SODIUM AND PIPERACILLIN SODIUM 3.38 G: 375; 3 INJECTION, SOLUTION INTRAVENOUS at 17:37

## 2017-01-01 RX ADMIN — IPRATROPIUM BROMIDE AND ALBUTEROL SULFATE 3 ML: .5; 3 SOLUTION RESPIRATORY (INHALATION) at 08:29

## 2017-01-01 RX ADMIN — FUROSEMIDE 20 MG: 10 INJECTION, SOLUTION INTRAVENOUS at 10:57

## 2017-01-01 RX ADMIN — DEXMEDETOMIDINE HYDROCHLORIDE 1 MCG/KG/HR: 100 INJECTION, SOLUTION INTRAVENOUS at 15:49

## 2017-01-01 RX ADMIN — Medication 1 PACKET: at 14:28

## 2017-01-01 RX ADMIN — CHLORHEXIDINE GLUCONATE 15 ML: 1.2 RINSE ORAL at 08:15

## 2017-01-01 RX ADMIN — DEXMEDETOMIDINE HYDROCHLORIDE 1 MCG/KG/HR: 100 INJECTION, SOLUTION INTRAVENOUS at 02:09

## 2017-01-01 RX ADMIN — INSULIN ASPART 1 UNITS: 100 INJECTION, SOLUTION INTRAVENOUS; SUBCUTANEOUS at 04:52

## 2017-01-01 RX ADMIN — LORAZEPAM 2 MG: 2 INJECTION INTRAMUSCULAR at 15:46

## 2017-01-01 RX ADMIN — INSULIN ASPART 1 UNITS: 100 INJECTION, SOLUTION INTRAVENOUS; SUBCUTANEOUS at 04:35

## 2017-01-01 RX ADMIN — MULTIVITAMIN 15 ML: LIQUID ORAL at 09:46

## 2017-01-01 RX ADMIN — FENTANYL CITRATE 25 MCG: 50 INJECTION, SOLUTION INTRAMUSCULAR; INTRAVENOUS at 01:18

## 2017-01-01 RX ADMIN — GLYCOPYRROLATE 0.1 MG: 0.2 INJECTION INTRAMUSCULAR; INTRAVENOUS at 11:14

## 2017-01-01 RX ADMIN — FENTANYL CITRATE 50 MCG: 50 INJECTION, SOLUTION INTRAMUSCULAR; INTRAVENOUS at 15:08

## 2017-01-01 RX ADMIN — WATER 20 NG/KG/MIN: 1 SOLUTION INTRAVENOUS at 11:47

## 2017-01-01 RX ADMIN — TAZOBACTAM SODIUM AND PIPERACILLIN SODIUM 3.38 G: 375; 3 INJECTION, SOLUTION INTRAVENOUS at 03:39

## 2017-01-01 RX ADMIN — TAZOBACTAM SODIUM AND PIPERACILLIN SODIUM 2.25 G: 250; 2 INJECTION, SOLUTION INTRAVENOUS at 04:57

## 2017-01-01 RX ADMIN — DEXMEDETOMIDINE HYDROCHLORIDE 1 MCG/KG/HR: 100 INJECTION, SOLUTION INTRAVENOUS at 05:48

## 2017-01-01 RX ADMIN — LORAZEPAM 1 MG: 2 INJECTION, SOLUTION INTRAMUSCULAR; INTRAVENOUS at 01:17

## 2017-01-01 RX ADMIN — TAZOBACTAM SODIUM AND PIPERACILLIN SODIUM 2.25 G: 250; 2 INJECTION, SOLUTION INTRAVENOUS at 05:49

## 2017-01-01 RX ADMIN — IPRATROPIUM BROMIDE AND ALBUTEROL SULFATE 3 ML: .5; 3 SOLUTION RESPIRATORY (INHALATION) at 19:44

## 2017-01-01 RX ADMIN — CHLORHEXIDINE GLUCONATE 15 ML: 1.2 RINSE ORAL at 21:08

## 2017-01-01 RX ADMIN — IPRATROPIUM BROMIDE AND ALBUTEROL SULFATE 3 ML: .5; 3 SOLUTION RESPIRATORY (INHALATION) at 16:13

## 2017-01-01 RX ADMIN — MIDAZOLAM 0.5 MG: 1 INJECTION INTRAMUSCULAR; INTRAVENOUS at 17:13

## 2017-01-01 RX ADMIN — EPOPROSTENOL SODIUM: 0.5 INJECTION, POWDER, LYOPHILIZED, FOR SOLUTION INTRAVENOUS at 19:22

## 2017-01-01 RX ADMIN — IPRATROPIUM BROMIDE AND ALBUTEROL SULFATE 3 ML: .5; 3 SOLUTION RESPIRATORY (INHALATION) at 15:23

## 2017-01-01 RX ADMIN — TAZOBACTAM SODIUM AND PIPERACILLIN SODIUM 3.38 G: 375; 3 INJECTION, SOLUTION INTRAVENOUS at 02:57

## 2017-01-01 RX ADMIN — CHLORHEXIDINE GLUCONATE 15 ML: 1.2 RINSE ORAL at 21:06

## 2017-01-01 RX ADMIN — SODIUM PHOSPHATE, DIBASIC AND SODIUM PHOSPHATE, MONOBASIC 1 ENEMA: 7; 19 ENEMA RECTAL at 16:47

## 2017-01-01 RX ADMIN — INSULIN ASPART 1 UNITS: 100 INJECTION, SOLUTION INTRAVENOUS; SUBCUTANEOUS at 15:50

## 2017-01-01 RX ADMIN — DEXMEDETOMIDINE HYDROCHLORIDE 0.7 MCG/KG/HR: 100 INJECTION, SOLUTION INTRAVENOUS at 06:38

## 2017-01-01 RX ADMIN — DEXMEDETOMIDINE HYDROCHLORIDE 0.7 MCG/KG/HR: 100 INJECTION, SOLUTION INTRAVENOUS at 08:50

## 2017-01-01 RX ADMIN — WATER 20 NG/KG/MIN: 1 SOLUTION INTRAVENOUS at 02:41

## 2017-01-01 RX ADMIN — SIMVASTATIN 40 MG: 20 TABLET, FILM COATED ORAL at 20:20

## 2017-01-01 RX ADMIN — IPRATROPIUM BROMIDE AND ALBUTEROL SULFATE 3 ML: .5; 3 SOLUTION RESPIRATORY (INHALATION) at 11:46

## 2017-01-01 RX ADMIN — APIXABAN 2.5 MG: 2.5 TABLET, FILM COATED ORAL at 21:45

## 2017-01-01 RX ADMIN — METOPROLOL TARTRATE 25 MG: 25 TABLET ORAL at 08:09

## 2017-01-01 RX ADMIN — Medication 7 ML: at 09:31

## 2017-01-01 RX ADMIN — LORAZEPAM 2 MG: 2 INJECTION INTRAMUSCULAR at 18:03

## 2017-01-01 RX ADMIN — IPRATROPIUM BROMIDE AND ALBUTEROL SULFATE 3 ML: .5; 3 SOLUTION RESPIRATORY (INHALATION) at 15:46

## 2017-01-01 RX ADMIN — INSULIN ASPART 1 UNITS: 100 INJECTION, SOLUTION INTRAVENOUS; SUBCUTANEOUS at 20:26

## 2017-01-01 RX ADMIN — GABAPENTIN 300 MG: 250 SOLUTION ORAL at 21:06

## 2017-01-01 RX ADMIN — APIXABAN 2.5 MG: 2.5 TABLET, FILM COATED ORAL at 09:46

## 2017-01-01 RX ADMIN — BISACODYL 10 MG: 10 SUPPOSITORY RECTAL at 16:52

## 2017-01-01 RX ADMIN — DEXMEDETOMIDINE HYDROCHLORIDE 1 MCG/KG/HR: 100 INJECTION, SOLUTION INTRAVENOUS at 08:27

## 2017-01-01 RX ADMIN — Medication 7 ML: at 07:37

## 2017-01-01 RX ADMIN — TAZOBACTAM SODIUM AND PIPERACILLIN SODIUM 2.25 G: 250; 2 INJECTION, SOLUTION INTRAVENOUS at 17:09

## 2017-01-01 RX ADMIN — CHLORHEXIDINE GLUCONATE 15 ML: 1.2 RINSE ORAL at 09:31

## 2017-01-01 RX ADMIN — SODIUM CHLORIDE 6 UNITS/HR: 9 INJECTION, SOLUTION INTRAVENOUS at 02:44

## 2017-01-01 RX ADMIN — TAZOBACTAM SODIUM AND PIPERACILLIN SODIUM 3.38 G: 375; 3 INJECTION, SOLUTION INTRAVENOUS at 22:43

## 2017-01-01 RX ADMIN — Medication 2 DROP: at 08:52

## 2017-01-01 RX ADMIN — ACETAMINOPHEN 650 MG: 325 TABLET, FILM COATED ORAL at 03:04

## 2017-01-01 RX ADMIN — PROPOFOL 110 MG: 10 INJECTION, EMULSION INTRAVENOUS at 08:49

## 2017-01-01 RX ADMIN — DEXMEDETOMIDINE HYDROCHLORIDE 0.2 MCG/KG/HR: 100 INJECTION, SOLUTION INTRAVENOUS at 17:38

## 2017-01-01 RX ADMIN — PROPOFOL 50 MCG/KG/MIN: 10 INJECTION, EMULSION INTRAVENOUS at 04:05

## 2017-01-01 RX ADMIN — EPOPROSTENOL SODIUM: 0.5 INJECTION, POWDER, LYOPHILIZED, FOR SOLUTION INTRAVENOUS at 19:39

## 2017-01-01 RX ADMIN — INSULIN ASPART 1 UNITS: 100 INJECTION, SOLUTION INTRAVENOUS; SUBCUTANEOUS at 12:10

## 2017-01-01 RX ADMIN — DEXMEDETOMIDINE HYDROCHLORIDE 0.7 MCG/KG/HR: 100 INJECTION, SOLUTION INTRAVENOUS at 22:49

## 2017-01-01 RX ADMIN — AMANTADINE HYDROCHLORIDE 100 MG: 100 CAPSULE ORAL at 11:44

## 2017-01-01 RX ADMIN — CHLORHEXIDINE GLUCONATE 15 ML: 1.2 RINSE ORAL at 07:40

## 2017-01-01 RX ADMIN — SIMVASTATIN 40 MG: 20 TABLET, FILM COATED ORAL at 21:50

## 2017-01-01 RX ADMIN — IPRATROPIUM BROMIDE AND ALBUTEROL SULFATE 3 ML: .5; 3 SOLUTION RESPIRATORY (INHALATION) at 19:21

## 2017-01-01 RX ADMIN — MIDAZOLAM 0.5 MG: 1 INJECTION INTRAMUSCULAR; INTRAVENOUS at 13:05

## 2017-01-01 RX ADMIN — INSULIN ASPART 1 UNITS: 100 INJECTION, SOLUTION INTRAVENOUS; SUBCUTANEOUS at 07:56

## 2017-01-01 RX ADMIN — IPRATROPIUM BROMIDE AND ALBUTEROL SULFATE 3 ML: .5; 3 SOLUTION RESPIRATORY (INHALATION) at 19:43

## 2017-01-01 RX ADMIN — EPOPROSTENOL SODIUM: 0.5 INJECTION, POWDER, LYOPHILIZED, FOR SOLUTION INTRAVENOUS at 04:03

## 2017-01-01 RX ADMIN — FUROSEMIDE 40 MG: 10 INJECTION, SOLUTION INTRAVENOUS at 10:22

## 2017-01-01 RX ADMIN — SODIUM CHLORIDE 1000 MG: 9 INJECTION, SOLUTION INTRAVENOUS at 08:38

## 2017-01-01 RX ADMIN — IPRATROPIUM BROMIDE AND ALBUTEROL SULFATE 3 ML: .5; 3 SOLUTION RESPIRATORY (INHALATION) at 20:03

## 2017-01-01 RX ADMIN — DEXMEDETOMIDINE HYDROCHLORIDE 1 MCG/KG/HR: 100 INJECTION, SOLUTION INTRAVENOUS at 16:32

## 2017-01-01 RX ADMIN — IPRATROPIUM BROMIDE AND ALBUTEROL SULFATE 3 ML: .5; 3 SOLUTION RESPIRATORY (INHALATION) at 12:02

## 2017-01-01 RX ADMIN — PROPOFOL 10 MCG/KG/MIN: 10 INJECTION, EMULSION INTRAVENOUS at 09:31

## 2017-01-01 RX ADMIN — PROPOFOL 12 MCG/KG/MIN: 10 INJECTION, EMULSION INTRAVENOUS at 18:33

## 2017-01-01 RX ADMIN — IPRATROPIUM BROMIDE AND ALBUTEROL SULFATE 3 ML: .5; 3 SOLUTION RESPIRATORY (INHALATION) at 16:54

## 2017-01-01 RX ADMIN — TOLTERODINE TARTRATE 1 MG: 1 TABLET, FILM COATED ORAL at 09:46

## 2017-01-01 RX ADMIN — Medication 9 MG/HR: at 08:36

## 2017-01-01 RX ADMIN — TOLTERODINE TARTRATE 1 MG: 1 TABLET, FILM COATED ORAL at 10:29

## 2017-01-01 RX ADMIN — METOPROLOL TARTRATE 25 MG: 25 TABLET ORAL at 21:21

## 2017-01-01 RX ADMIN — AMANTADINE HYDROCHLORIDE 100 MG: 50 SOLUTION ORAL at 09:31

## 2017-01-01 RX ADMIN — INSULIN ASPART 1 UNITS: 100 INJECTION, SOLUTION INTRAVENOUS; SUBCUTANEOUS at 12:23

## 2017-01-01 RX ADMIN — DEXTROSE MONOHYDRATE 15 MMOL: 5 INJECTION, SOLUTION INTRAVENOUS at 23:11

## 2017-01-01 RX ADMIN — SODIUM CHLORIDE 90 ML: 9 INJECTION, SOLUTION INTRAVENOUS at 11:39

## 2017-01-01 RX ADMIN — VECURONIUM BROMIDE 10 MG: 1 INJECTION, POWDER, LYOPHILIZED, FOR SOLUTION INTRAVENOUS at 12:16

## 2017-01-01 RX ADMIN — GABAPENTIN 300 MG: 250 SOLUTION ORAL at 21:59

## 2017-01-01 RX ADMIN — GABAPENTIN 300 MG: 300 CAPSULE ORAL at 22:19

## 2017-01-01 RX ADMIN — EPOPROSTENOL SODIUM: 0.5 INJECTION, POWDER, LYOPHILIZED, FOR SOLUTION INTRAVENOUS at 19:38

## 2017-01-01 RX ADMIN — APIXABAN 2.5 MG: 2.5 TABLET, FILM COATED ORAL at 22:38

## 2017-01-01 RX ADMIN — IPRATROPIUM BROMIDE AND ALBUTEROL SULFATE 3 ML: .5; 3 SOLUTION RESPIRATORY (INHALATION) at 20:22

## 2017-01-01 RX ADMIN — Medication 150 MCG/HR: at 09:21

## 2017-01-01 RX ADMIN — PROPOFOL 5 MCG/KG/MIN: 10 INJECTION, EMULSION INTRAVENOUS at 09:45

## 2017-01-01 RX ADMIN — AMANTADINE HYDROCHLORIDE 100 MG: 50 SOLUTION ORAL at 07:36

## 2017-01-01 RX ADMIN — TOLTERODINE TARTRATE 1 MG: 1 TABLET, FILM COATED ORAL at 10:04

## 2017-01-01 RX ADMIN — Medication 3 MG/HR: at 11:12

## 2017-01-01 RX ADMIN — IPRATROPIUM BROMIDE AND ALBUTEROL SULFATE 3 ML: .5; 3 SOLUTION RESPIRATORY (INHALATION) at 07:41

## 2017-01-01 RX ADMIN — VANCOMYCIN HYDROCHLORIDE 1500 MG: 5 INJECTION, POWDER, LYOPHILIZED, FOR SOLUTION INTRAVENOUS at 13:51

## 2017-01-01 RX ADMIN — AMANTADINE HYDROCHLORIDE 100 MG: 50 SOLUTION ORAL at 09:54

## 2017-01-01 RX ADMIN — POTASSIUM CHLORIDE 40 MEQ: 1.5 POWDER, FOR SOLUTION ORAL at 17:27

## 2017-01-01 RX ADMIN — IOPAMIDOL 125 ML: 755 INJECTION, SOLUTION INTRAVENOUS at 09:19

## 2017-01-01 RX ADMIN — IPRATROPIUM BROMIDE AND ALBUTEROL SULFATE 3 ML: .5; 3 SOLUTION RESPIRATORY (INHALATION) at 19:38

## 2017-01-01 RX ADMIN — SIMVASTATIN 40 MG: 20 TABLET, FILM COATED ORAL at 22:38

## 2017-01-01 RX ADMIN — FUROSEMIDE 20 MG: 10 INJECTION, SOLUTION INTRAVENOUS at 15:21

## 2017-01-01 RX ADMIN — AMANTADINE HYDROCHLORIDE 100 MG: 50 SOLUTION ORAL at 08:02

## 2017-01-01 RX ADMIN — FENTANYL CITRATE 25 MCG: 50 INJECTION, SOLUTION INTRAMUSCULAR; INTRAVENOUS at 23:08

## 2017-01-01 RX ADMIN — APIXABAN 2.5 MG: 2.5 TABLET, FILM COATED ORAL at 07:40

## 2017-01-01 RX ADMIN — GABAPENTIN 300 MG: 250 SOLUTION ORAL at 21:13

## 2017-01-01 RX ADMIN — POTASSIUM CHLORIDE 40 MEQ: 1.5 POWDER, FOR SOLUTION ORAL at 09:53

## 2017-01-01 RX ADMIN — IPRATROPIUM BROMIDE AND ALBUTEROL SULFATE 3 ML: .5; 3 SOLUTION RESPIRATORY (INHALATION) at 11:20

## 2017-01-01 RX ADMIN — DEXMEDETOMIDINE HYDROCHLORIDE 0.7 MCG/KG/HR: 100 INJECTION, SOLUTION INTRAVENOUS at 23:51

## 2017-01-01 RX ADMIN — APIXABAN 2.5 MG: 2.5 TABLET, FILM COATED ORAL at 08:00

## 2017-01-01 RX ADMIN — IPRATROPIUM BROMIDE AND ALBUTEROL SULFATE 3 ML: .5; 3 SOLUTION RESPIRATORY (INHALATION) at 12:09

## 2017-01-01 RX ADMIN — MULTIVITAMIN 15 ML: LIQUID ORAL at 07:37

## 2017-01-01 RX ADMIN — CHLORHEXIDINE GLUCONATE 15 ML: 1.2 RINSE ORAL at 07:35

## 2017-01-01 RX ADMIN — APIXABAN 2.5 MG: 2.5 TABLET, FILM COATED ORAL at 21:47

## 2017-01-01 RX ADMIN — APIXABAN 2.5 MG: 2.5 TABLET, FILM COATED ORAL at 20:26

## 2017-01-01 RX ADMIN — Medication 150 MCG/HR: at 09:52

## 2017-01-01 RX ADMIN — POTASSIUM CHLORIDE 40 MEQ: 1.5 POWDER, FOR SOLUTION ORAL at 22:03

## 2017-01-01 RX ADMIN — PROPOFOL 10 MCG/KG/MIN: 10 INJECTION, EMULSION INTRAVENOUS at 20:20

## 2017-01-01 RX ADMIN — VANCOMYCIN HYDROCHLORIDE 1500 MG: 5 INJECTION, POWDER, LYOPHILIZED, FOR SOLUTION INTRAVENOUS at 14:49

## 2017-01-01 RX ADMIN — GABAPENTIN 300 MG: 300 CAPSULE ORAL at 21:21

## 2017-01-01 RX ADMIN — AMANTADINE HYDROCHLORIDE 100 MG: 50 SOLUTION ORAL at 10:29

## 2017-01-01 RX ADMIN — AMANTADINE HYDROCHLORIDE 100 MG: 50 SOLUTION ORAL at 10:20

## 2017-01-01 RX ADMIN — IPRATROPIUM BROMIDE AND ALBUTEROL SULFATE 3 ML: .5; 3 SOLUTION RESPIRATORY (INHALATION) at 07:15

## 2017-01-01 RX ADMIN — SIMVASTATIN 40 MG: 20 TABLET, FILM COATED ORAL at 21:07

## 2017-01-01 RX ADMIN — WATER 20 NG/KG/MIN: 1 SOLUTION INTRAVENOUS at 03:58

## 2017-01-01 RX ADMIN — AMANTADINE HYDROCHLORIDE 100 MG: 100 CAPSULE ORAL at 08:11

## 2017-01-01 RX ADMIN — DEXMEDETOMIDINE HYDROCHLORIDE 1 MCG/KG/HR: 100 INJECTION, SOLUTION INTRAVENOUS at 21:57

## 2017-01-01 RX ADMIN — IPRATROPIUM BROMIDE AND ALBUTEROL SULFATE 3 ML: .5; 3 SOLUTION RESPIRATORY (INHALATION) at 11:48

## 2017-01-01 RX ADMIN — WATER 20 NG/KG/MIN: 1 SOLUTION INTRAVENOUS at 04:05

## 2017-01-01 RX ADMIN — INSULIN ASPART 1 UNITS: 100 INJECTION, SOLUTION INTRAVENOUS; SUBCUTANEOUS at 08:00

## 2017-01-01 RX ADMIN — Medication 0.25 MG: at 13:18

## 2017-01-01 RX ADMIN — MICONAZOLE NITRATE: 2 POWDER TOPICAL at 22:44

## 2017-01-01 RX ADMIN — CHLORHEXIDINE GLUCONATE 15 ML: 1.2 RINSE ORAL at 21:47

## 2017-01-01 RX ADMIN — Medication 150 MCG/HR: at 00:35

## 2017-01-01 RX ADMIN — TAZOBACTAM SODIUM AND PIPERACILLIN SODIUM 3.38 G: 375; 3 INJECTION, SOLUTION INTRAVENOUS at 15:02

## 2017-01-01 RX ADMIN — IPRATROPIUM BROMIDE AND ALBUTEROL SULFATE 3 ML: .5; 3 SOLUTION RESPIRATORY (INHALATION) at 20:21

## 2017-01-01 RX ADMIN — Medication 40 MG: at 08:28

## 2017-01-01 RX ADMIN — SODIUM CHLORIDE 1.5 UNITS/HR: 9 INJECTION, SOLUTION INTRAVENOUS at 09:45

## 2017-01-01 RX ADMIN — TAZOBACTAM SODIUM AND PIPERACILLIN SODIUM 2.25 G: 250; 2 INJECTION, SOLUTION INTRAVENOUS at 23:29

## 2017-01-01 RX ADMIN — TAZOBACTAM SODIUM AND PIPERACILLIN SODIUM 2.25 G: 250; 2 INJECTION, SOLUTION INTRAVENOUS at 11:00

## 2017-01-01 RX ADMIN — PROPOFOL 10 MCG/KG/MIN: 10 INJECTION, EMULSION INTRAVENOUS at 01:50

## 2017-01-01 RX ADMIN — Medication 7 ML: at 10:20

## 2017-01-01 RX ADMIN — DEXMEDETOMIDINE HYDROCHLORIDE 0.7 MCG/KG/HR: 100 INJECTION, SOLUTION INTRAVENOUS at 15:50

## 2017-01-01 RX ADMIN — WATER 20 NG/KG/MIN: 1 SOLUTION INTRAVENOUS at 11:29

## 2017-01-01 RX ADMIN — TAZOBACTAM SODIUM AND PIPERACILLIN SODIUM 3.38 G: 375; 3 INJECTION, SOLUTION INTRAVENOUS at 12:46

## 2017-01-01 RX ADMIN — INSULIN ASPART 2 UNITS: 100 INJECTION, SOLUTION INTRAVENOUS; SUBCUTANEOUS at 23:53

## 2017-01-01 RX ADMIN — TAZOBACTAM SODIUM AND PIPERACILLIN SODIUM 3.38 G: 375; 3 INJECTION, SOLUTION INTRAVENOUS at 05:41

## 2017-01-01 RX ADMIN — IOPAMIDOL 71 ML: 755 INJECTION, SOLUTION INTRAVENOUS at 11:39

## 2017-01-01 RX ADMIN — APIXABAN 2.5 MG: 2.5 TABLET, FILM COATED ORAL at 07:36

## 2017-01-01 RX ADMIN — Medication 40 MG: at 07:41

## 2017-01-01 RX ADMIN — INSULIN ASPART 4 UNITS: 100 INJECTION, SOLUTION INTRAVENOUS; SUBCUTANEOUS at 23:46

## 2017-01-01 RX ADMIN — FUROSEMIDE 20 MG: 10 INJECTION, SOLUTION INTRAVENOUS at 16:12

## 2017-01-01 RX ADMIN — INSULIN ASPART 2 UNITS: 100 INJECTION, SOLUTION INTRAVENOUS; SUBCUTANEOUS at 04:31

## 2017-01-01 RX ADMIN — TAZOBACTAM SODIUM AND PIPERACILLIN SODIUM 2.25 G: 250; 2 INJECTION, SOLUTION INTRAVENOUS at 22:56

## 2017-01-01 RX ADMIN — FENTANYL CITRATE 150 MCG/HR: 50 INJECTION, SOLUTION INTRAMUSCULAR; INTRAVENOUS at 20:45

## 2017-01-01 RX ADMIN — GABAPENTIN 300 MG: 250 SOLUTION ORAL at 21:47

## 2017-01-01 RX ADMIN — NIFEDIPINE 60 MG: 30 TABLET, FILM COATED, EXTENDED RELEASE ORAL at 08:11

## 2017-01-01 RX ADMIN — MULTIVITAMIN 15 ML: LIQUID ORAL at 08:00

## 2017-01-01 RX ADMIN — SODIUM CHLORIDE 1000 MG: 9 INJECTION, SOLUTION INTRAVENOUS at 09:32

## 2017-01-01 RX ADMIN — Medication 5 MG/HR: at 11:44

## 2017-01-01 RX ADMIN — MULTIVITAMIN 15 ML: LIQUID ORAL at 07:40

## 2017-01-01 RX ADMIN — Medication 40 MG: at 08:51

## 2017-01-01 RX ADMIN — PROPOFOL 50 MCG/KG/MIN: 10 INJECTION, EMULSION INTRAVENOUS at 07:33

## 2017-01-01 RX ADMIN — DEXMEDETOMIDINE HYDROCHLORIDE 0.7 MCG/KG/HR: 100 INJECTION, SOLUTION INTRAVENOUS at 14:24

## 2017-01-01 RX ADMIN — FENTANYL CITRATE 50 MCG: 50 INJECTION, SOLUTION INTRAMUSCULAR; INTRAVENOUS at 19:14

## 2017-01-01 RX ADMIN — AMANTADINE HYDROCHLORIDE 100 MG: 100 CAPSULE ORAL at 09:19

## 2017-01-01 RX ADMIN — SIMVASTATIN 40 MG: 20 TABLET, FILM COATED ORAL at 21:21

## 2017-01-01 RX ADMIN — ACETAMINOPHEN 650 MG: 325 TABLET, FILM COATED ORAL at 22:27

## 2017-01-01 RX ADMIN — GABAPENTIN 300 MG: 250 SOLUTION ORAL at 20:43

## 2017-01-01 RX ADMIN — SIMVASTATIN 40 MG: 20 TABLET, FILM COATED ORAL at 22:29

## 2017-01-01 RX ADMIN — SODIUM CHLORIDE, PRESERVATIVE FREE: 5 INJECTION INTRAVENOUS at 09:40

## 2017-01-01 RX ADMIN — SODIUM CHLORIDE 5 UNITS/HR: 9 INJECTION, SOLUTION INTRAVENOUS at 08:48

## 2017-01-01 RX ADMIN — DEXMEDETOMIDINE HYDROCHLORIDE 0.4 MCG/KG/HR: 100 INJECTION, SOLUTION INTRAVENOUS at 09:56

## 2017-01-01 RX ADMIN — TAZOBACTAM SODIUM AND PIPERACILLIN SODIUM 3.38 G: 375; 3 INJECTION, SOLUTION INTRAVENOUS at 23:08

## 2017-01-01 RX ADMIN — POTASSIUM CHLORIDE 40 MEQ: 1.5 POWDER, FOR SOLUTION ORAL at 00:15

## 2017-01-01 RX ADMIN — INSULIN ASPART 4 UNITS: 100 INJECTION, SOLUTION INTRAVENOUS; SUBCUTANEOUS at 19:55

## 2017-01-01 RX ADMIN — SIMVASTATIN 40 MG: 20 TABLET, FILM COATED ORAL at 21:54

## 2017-01-01 RX ADMIN — SODIUM CHLORIDE 80 ML: 9 INJECTION, SOLUTION INTRAVENOUS at 09:19

## 2017-01-01 RX ADMIN — FENTANYL CITRATE 50 MCG: 50 INJECTION, SOLUTION INTRAMUSCULAR; INTRAVENOUS at 19:29

## 2017-01-01 RX ADMIN — TAZOBACTAM SODIUM AND PIPERACILLIN SODIUM 2.25 G: 250; 2 INJECTION, SOLUTION INTRAVENOUS at 22:25

## 2017-01-01 RX ADMIN — Medication 1 PACKET: at 17:52

## 2017-01-01 RX ADMIN — Medication 8 MG/HR: at 08:54

## 2017-01-01 RX ADMIN — GABAPENTIN 300 MG: 250 SOLUTION ORAL at 21:08

## 2017-01-01 RX ADMIN — INSULIN ASPART 1 UNITS: 100 INJECTION, SOLUTION INTRAVENOUS; SUBCUTANEOUS at 20:19

## 2017-01-01 RX ADMIN — DEXMEDETOMIDINE HYDROCHLORIDE 0.7 MCG/KG/HR: 100 INJECTION, SOLUTION INTRAVENOUS at 07:00

## 2017-01-01 RX ADMIN — AZITHROMYCIN 250 MG: 250 TABLET, FILM COATED ORAL at 08:09

## 2017-01-01 RX ADMIN — POTASSIUM CHLORIDE 40 MEQ: 1.5 POWDER, FOR SOLUTION ORAL at 15:14

## 2017-01-01 RX ADMIN — TAZOBACTAM SODIUM AND PIPERACILLIN SODIUM 3.38 G: 375; 3 INJECTION, SOLUTION INTRAVENOUS at 17:03

## 2017-01-01 RX ADMIN — EPOPROSTENOL SODIUM: 0.5 INJECTION, POWDER, LYOPHILIZED, FOR SOLUTION INTRAVENOUS at 11:50

## 2017-01-01 RX ADMIN — SODIUM CHLORIDE 1000 MG: 9 INJECTION, SOLUTION INTRAVENOUS at 08:21

## 2017-01-01 RX ADMIN — DEXMEDETOMIDINE HYDROCHLORIDE 0.4 MCG/KG/HR: 100 INJECTION, SOLUTION INTRAVENOUS at 21:34

## 2017-01-01 RX ADMIN — PROPOFOL 40 MCG/KG/MIN: 10 INJECTION, EMULSION INTRAVENOUS at 23:15

## 2017-01-01 RX ADMIN — CHLORHEXIDINE GLUCONATE 15 ML: 1.2 RINSE ORAL at 10:04

## 2017-01-01 RX ADMIN — INSULIN ASPART 1 UNITS: 100 INJECTION, SOLUTION INTRAVENOUS; SUBCUTANEOUS at 00:07

## 2017-01-01 RX ADMIN — INSULIN ASPART 2 UNITS: 100 INJECTION, SOLUTION INTRAVENOUS; SUBCUTANEOUS at 16:14

## 2017-01-01 RX ADMIN — NIFEDIPINE 60 MG: 30 TABLET, FILM COATED, EXTENDED RELEASE ORAL at 08:49

## 2017-01-01 RX ADMIN — INSULIN ASPART 1 UNITS: 100 INJECTION, SOLUTION INTRAVENOUS; SUBCUTANEOUS at 12:29

## 2017-01-01 RX ADMIN — Medication 2 DROP: at 07:58

## 2017-01-01 RX ADMIN — TAZOBACTAM SODIUM AND PIPERACILLIN SODIUM 3.38 G: 375; 3 INJECTION, SOLUTION INTRAVENOUS at 11:47

## 2017-01-01 RX ADMIN — VECURONIUM BROMIDE 0.8 MCG/KG/MIN: 1 INJECTION, POWDER, LYOPHILIZED, FOR SOLUTION INTRAVENOUS at 15:07

## 2017-01-01 RX ADMIN — TOLTERODINE TARTRATE 4 MG: 2 CAPSULE, EXTENDED RELEASE ORAL at 08:49

## 2017-01-01 RX ADMIN — GABAPENTIN 300 MG: 300 CAPSULE ORAL at 22:39

## 2017-01-01 RX ADMIN — SIMVASTATIN 40 MG: 20 TABLET, FILM COATED ORAL at 21:44

## 2017-01-01 RX ADMIN — INSULIN ASPART 1 UNITS: 100 INJECTION, SOLUTION INTRAVENOUS; SUBCUTANEOUS at 08:21

## 2017-01-01 RX ADMIN — SODIUM CHLORIDE 4 UNITS/HR: 9 INJECTION, SOLUTION INTRAVENOUS at 15:23

## 2017-01-01 RX ADMIN — FUROSEMIDE 40 MG: 10 INJECTION, SOLUTION INTRAVENOUS at 14:43

## 2017-01-01 RX ADMIN — TAZOBACTAM SODIUM AND PIPERACILLIN SODIUM 2.25 G: 250; 2 INJECTION, SOLUTION INTRAVENOUS at 17:12

## 2017-01-01 RX ADMIN — MICONAZOLE NITRATE: 2 POWDER TOPICAL at 07:58

## 2017-01-01 RX ADMIN — APIXABAN 2.5 MG: 2.5 TABLET, FILM COATED ORAL at 08:12

## 2017-01-01 RX ADMIN — INSULIN ASPART 4 UNITS: 100 INJECTION, SOLUTION INTRAVENOUS; SUBCUTANEOUS at 11:58

## 2017-01-01 RX ADMIN — Medication 150 MCG/HR: at 19:28

## 2017-01-01 RX ADMIN — IPRATROPIUM BROMIDE AND ALBUTEROL SULFATE 3 ML: .5; 3 SOLUTION RESPIRATORY (INHALATION) at 15:31

## 2017-01-01 RX ADMIN — SODIUM CHLORIDE 8 UNITS/HR: 9 INJECTION, SOLUTION INTRAVENOUS at 15:12

## 2017-01-01 RX ADMIN — TAZOBACTAM SODIUM AND PIPERACILLIN SODIUM 2.25 G: 250; 2 INJECTION, SOLUTION INTRAVENOUS at 17:14

## 2017-01-01 RX ADMIN — EPOPROSTENOL SODIUM: 0.5 INJECTION, POWDER, LYOPHILIZED, FOR SOLUTION INTRAVENOUS at 11:33

## 2017-01-01 RX ADMIN — CHLORHEXIDINE GLUCONATE 15 ML: 1.2 RINSE ORAL at 21:05

## 2017-01-01 RX ADMIN — ACETAMINOPHEN 650 MG: 325 TABLET, FILM COATED ORAL at 05:21

## 2017-01-01 RX ADMIN — Medication 0.2 MG: at 15:45

## 2017-01-01 RX ADMIN — Medication 150 MCG/HR: at 05:06

## 2017-01-01 RX ADMIN — IPRATROPIUM BROMIDE AND ALBUTEROL SULFATE 3 ML: .5; 3 SOLUTION RESPIRATORY (INHALATION) at 07:54

## 2017-01-01 RX ADMIN — TAZOBACTAM SODIUM AND PIPERACILLIN SODIUM 2.25 G: 250; 2 INJECTION, SOLUTION INTRAVENOUS at 10:51

## 2017-01-01 RX ADMIN — APIXABAN 2.5 MG: 2.5 TABLET, FILM COATED ORAL at 08:51

## 2017-01-01 RX ADMIN — APIXABAN 2.5 MG: 2.5 TABLET, FILM COATED ORAL at 21:05

## 2017-01-01 RX ADMIN — Medication 40 MG: at 12:51

## 2017-01-01 RX ADMIN — IPRATROPIUM BROMIDE AND ALBUTEROL SULFATE 3 ML: .5; 3 SOLUTION RESPIRATORY (INHALATION) at 11:44

## 2017-01-01 RX ADMIN — DEXMEDETOMIDINE HYDROCHLORIDE 1 MCG/KG/HR: 100 INJECTION, SOLUTION INTRAVENOUS at 04:19

## 2017-01-01 RX ADMIN — PROPOFOL 5 MCG/KG/MIN: 10 INJECTION, EMULSION INTRAVENOUS at 09:15

## 2017-01-01 RX ADMIN — Medication 100 MCG/HR: at 21:47

## 2017-01-01 RX ADMIN — IPRATROPIUM BROMIDE AND ALBUTEROL SULFATE 3 ML: .5; 3 SOLUTION RESPIRATORY (INHALATION) at 16:17

## 2017-01-01 RX ADMIN — CHLORHEXIDINE GLUCONATE 15 ML: 1.2 RINSE ORAL at 08:51

## 2017-01-01 RX ADMIN — Medication 1 PACKET: at 21:48

## 2017-01-01 RX ADMIN — TOLTERODINE TARTRATE 2 MG: 1 TABLET, FILM COATED ORAL at 21:05

## 2017-01-01 RX ADMIN — Medication 1 PACKET: at 13:05

## 2017-01-01 RX ADMIN — EPOPROSTENOL SODIUM: 0.5 INJECTION, POWDER, LYOPHILIZED, FOR SOLUTION INTRAVENOUS at 02:49

## 2017-01-01 RX ADMIN — DEXMEDETOMIDINE HYDROCHLORIDE 1 MCG/KG/HR: 100 INJECTION, SOLUTION INTRAVENOUS at 09:46

## 2017-01-01 RX ADMIN — EPOPROSTENOL SODIUM: 0.5 INJECTION, POWDER, LYOPHILIZED, FOR SOLUTION INTRAVENOUS at 03:35

## 2017-01-01 RX ADMIN — GABAPENTIN 300 MG: 250 SOLUTION ORAL at 23:33

## 2017-01-01 RX ADMIN — FUROSEMIDE 20 MG: 10 INJECTION, SOLUTION INTRAVENOUS at 17:51

## 2017-01-01 RX ADMIN — DEXMEDETOMIDINE HYDROCHLORIDE 1 MCG/KG/HR: 100 INJECTION, SOLUTION INTRAVENOUS at 18:47

## 2017-01-01 RX ADMIN — INSULIN GLARGINE 35 UNITS: 100 INJECTION, SOLUTION SUBCUTANEOUS at 10:17

## 2017-01-01 RX ADMIN — TAZOBACTAM SODIUM AND PIPERACILLIN SODIUM 3.38 G: 375; 3 INJECTION, SOLUTION INTRAVENOUS at 03:35

## 2017-01-01 RX ADMIN — INSULIN ASPART 1 UNITS: 100 INJECTION, SOLUTION INTRAVENOUS; SUBCUTANEOUS at 16:05

## 2017-01-01 RX ADMIN — TAZOBACTAM SODIUM AND PIPERACILLIN SODIUM 2.25 G: 250; 2 INJECTION, SOLUTION INTRAVENOUS at 23:59

## 2017-01-01 RX ADMIN — IPRATROPIUM BROMIDE AND ALBUTEROL SULFATE 3 ML: .5; 3 SOLUTION RESPIRATORY (INHALATION) at 11:29

## 2017-01-01 RX ADMIN — TAZOBACTAM SODIUM AND PIPERACILLIN SODIUM 3.38 G: 375; 3 INJECTION, SOLUTION INTRAVENOUS at 08:07

## 2017-01-01 RX ADMIN — LORAZEPAM 0.5 MG: 2 INJECTION, SOLUTION INTRAMUSCULAR; INTRAVENOUS at 20:54

## 2017-01-01 RX ADMIN — TAZOBACTAM SODIUM AND PIPERACILLIN SODIUM 3.38 G: 375; 3 INJECTION, SOLUTION INTRAVENOUS at 23:54

## 2017-01-01 RX ADMIN — Medication 50 MCG/HR: at 17:50

## 2017-01-01 RX ADMIN — IPRATROPIUM BROMIDE AND ALBUTEROL SULFATE 3 ML: .5; 3 SOLUTION RESPIRATORY (INHALATION) at 15:32

## 2017-01-01 RX ADMIN — MICONAZOLE NITRATE: 2 POWDER TOPICAL at 18:18

## 2017-01-01 RX ADMIN — AMANTADINE HYDROCHLORIDE 100 MG: 50 SOLUTION ORAL at 09:55

## 2017-01-01 RX ADMIN — GABAPENTIN 300 MG: 300 CAPSULE ORAL at 21:44

## 2017-01-01 RX ADMIN — IPRATROPIUM BROMIDE AND ALBUTEROL SULFATE 3 ML: .5; 3 SOLUTION RESPIRATORY (INHALATION) at 15:57

## 2017-01-01 RX ADMIN — TOLTERODINE TARTRATE 4 MG: 2 CAPSULE, EXTENDED RELEASE ORAL at 09:18

## 2017-01-01 RX ADMIN — SODIUM CHLORIDE 4 UNITS/HR: 9 INJECTION, SOLUTION INTRAVENOUS at 20:15

## 2017-01-01 RX ADMIN — Medication 0.2 MG: at 20:17

## 2017-01-01 RX ADMIN — GABAPENTIN 300 MG: 300 CAPSULE ORAL at 21:50

## 2017-01-01 RX ADMIN — MULTIVITAMIN 15 ML: LIQUID ORAL at 08:51

## 2017-01-01 RX ADMIN — Medication 40 MG: at 08:59

## 2017-01-01 RX ADMIN — MULTIVITAMIN 15 ML: LIQUID ORAL at 09:31

## 2017-01-01 RX ADMIN — SIMVASTATIN 40 MG: 20 TABLET, FILM COATED ORAL at 22:12

## 2017-01-01 RX ADMIN — SIMVASTATIN 40 MG: 20 TABLET, FILM COATED ORAL at 20:32

## 2017-01-01 RX ADMIN — PROPOFOL 25 MCG/KG/MIN: 10 INJECTION, EMULSION INTRAVENOUS at 18:22

## 2017-01-01 RX ADMIN — CHLORHEXIDINE GLUCONATE 15 ML: 1.2 RINSE ORAL at 20:48

## 2017-01-01 RX ADMIN — TAZOBACTAM SODIUM AND PIPERACILLIN SODIUM 3.38 G: 375; 3 INJECTION, SOLUTION INTRAVENOUS at 17:36

## 2017-01-01 RX ADMIN — ACETAMINOPHEN 650 MG: 325 SOLUTION ORAL at 04:54

## 2017-01-01 RX ADMIN — TAZOBACTAM SODIUM AND PIPERACILLIN SODIUM 3.38 G: 375; 3 INJECTION, SOLUTION INTRAVENOUS at 05:04

## 2017-01-01 RX ADMIN — Medication 2 DROP: at 16:34

## 2017-01-01 RX ADMIN — Medication 40 MG: at 09:31

## 2017-01-01 RX ADMIN — FENTANYL CITRATE 50 MCG: 50 INJECTION, SOLUTION INTRAMUSCULAR; INTRAVENOUS at 11:33

## 2017-01-01 RX ADMIN — Medication 150 MCG/HR: at 14:58

## 2017-01-01 RX ADMIN — APIXABAN 2.5 MG: 2.5 TABLET, FILM COATED ORAL at 08:55

## 2017-01-01 RX ADMIN — APIXABAN 2.5 MG: 2.5 TABLET, FILM COATED ORAL at 21:08

## 2017-01-01 RX ADMIN — SODIUM CHLORIDE, POTASSIUM CHLORIDE, SODIUM LACTATE AND CALCIUM CHLORIDE 1000 ML: 600; 310; 30; 20 INJECTION, SOLUTION INTRAVENOUS at 09:37

## 2017-01-01 RX ADMIN — TAZOBACTAM SODIUM AND PIPERACILLIN SODIUM 3.38 G: 375; 3 INJECTION, SOLUTION INTRAVENOUS at 08:40

## 2017-01-01 RX ADMIN — AZITHROMYCIN MONOHYDRATE 500 MG: 500 INJECTION, POWDER, LYOPHILIZED, FOR SOLUTION INTRAVENOUS at 11:39

## 2017-01-01 RX ADMIN — Medication 1 PACKET: at 07:35

## 2017-01-01 RX ADMIN — TAZOBACTAM SODIUM AND PIPERACILLIN SODIUM 2.25 G: 250; 2 INJECTION, SOLUTION INTRAVENOUS at 04:41

## 2017-01-01 RX ADMIN — DEXMEDETOMIDINE HYDROCHLORIDE 1 MCG/KG/HR: 100 INJECTION, SOLUTION INTRAVENOUS at 20:17

## 2017-01-01 RX ADMIN — TAZOBACTAM SODIUM AND PIPERACILLIN SODIUM 3.38 G: 375; 3 INJECTION, SOLUTION INTRAVENOUS at 11:13

## 2017-01-01 RX ADMIN — APIXABAN 2.5 MG: 2.5 TABLET, FILM COATED ORAL at 10:04

## 2017-01-01 RX ADMIN — IPRATROPIUM BROMIDE AND ALBUTEROL SULFATE 3 ML: .5; 3 SOLUTION RESPIRATORY (INHALATION) at 07:34

## 2017-01-01 RX ADMIN — TAZOBACTAM SODIUM AND PIPERACILLIN SODIUM 3.38 G: 375; 3 INJECTION, SOLUTION INTRAVENOUS at 05:54

## 2017-01-01 RX ADMIN — IPRATROPIUM BROMIDE AND ALBUTEROL SULFATE 3 ML: .5; 3 SOLUTION RESPIRATORY (INHALATION) at 12:14

## 2017-01-01 RX ADMIN — GABAPENTIN 300 MG: 250 SOLUTION ORAL at 21:52

## 2017-01-01 RX ADMIN — INSULIN ASPART 4 UNITS: 100 INJECTION, SOLUTION INTRAVENOUS; SUBCUTANEOUS at 09:45

## 2017-01-01 RX ADMIN — SODIUM CHLORIDE: 9 INJECTION, SOLUTION INTRAVENOUS at 14:22

## 2017-01-01 RX ADMIN — INSULIN ASPART 1 UNITS: 100 INJECTION, SOLUTION INTRAVENOUS; SUBCUTANEOUS at 04:05

## 2017-01-01 RX ADMIN — CHLORHEXIDINE GLUCONATE 15 ML: 1.2 RINSE ORAL at 09:46

## 2017-01-01 RX ADMIN — GABAPENTIN 300 MG: 250 SOLUTION ORAL at 21:12

## 2017-01-01 RX ADMIN — IPRATROPIUM BROMIDE AND ALBUTEROL SULFATE 3 ML: .5; 3 SOLUTION RESPIRATORY (INHALATION) at 20:25

## 2017-01-01 RX ADMIN — NIFEDIPINE 60 MG: 30 TABLET, FILM COATED, EXTENDED RELEASE ORAL at 08:09

## 2017-01-01 RX ADMIN — WATER 20 NG/KG/MIN: 1 SOLUTION INTRAVENOUS at 19:36

## 2017-01-01 RX ADMIN — INSULIN ASPART 1 UNITS: 100 INJECTION, SOLUTION INTRAVENOUS; SUBCUTANEOUS at 20:33

## 2017-01-01 RX ADMIN — APIXABAN 2.5 MG: 2.5 TABLET, FILM COATED ORAL at 21:50

## 2017-01-01 RX ADMIN — Medication 0.2 MG: at 18:00

## 2017-01-01 RX ADMIN — TOLTERODINE TARTRATE 1 MG: 1 TABLET, FILM COATED ORAL at 21:07

## 2017-01-01 RX ADMIN — SODIUM CHLORIDE 6 UNITS/HR: 9 INJECTION, SOLUTION INTRAVENOUS at 02:11

## 2017-01-01 RX ADMIN — Medication 7 ML: at 08:02

## 2017-01-01 RX ADMIN — IPRATROPIUM BROMIDE AND ALBUTEROL SULFATE 3 ML: .5; 3 SOLUTION RESPIRATORY (INHALATION) at 07:43

## 2017-01-01 RX ADMIN — ACETAMINOPHEN 650 MG: 325 SOLUTION ORAL at 11:26

## 2017-01-01 ASSESSMENT — ACTIVITIES OF DAILY LIVING (ADL)
DRESS: 1-->ASSISTIVE EQUIPMENT
AMBULATION: 1-->ASSISTIVE EQUIPMENT
COGNITION: 0 - NO COGNITION ISSUES REPORTED
PREVIOUS_RESPONSIBILITIES: MEAL PREP;HOUSEKEEPING;LAUNDRY;SHOPPING;MEDICATION MANAGEMENT
NUMBER_OF_TIMES_PATIENT_HAS_FALLEN_WITHIN_LAST_SIX_MONTHS: 1
TRANSFERRING: 1-->ASSISTIVE EQUIPMENT
SWALLOWING: 2-->DIFFICULTY SWALLOWING LIQUIDS
RETIRED_COMMUNICATION: 0-->UNDERSTANDS/COMMUNICATES WITHOUT DIFFICULTY
BATHING: 1-->ASSISTIVE EQUIPMENT
WHICH_OF_THE_ABOVE_FUNCTIONAL_RISKS_HAD_A_RECENT_ONSET_OR_CHANGE?: AMBULATION
RETIRED_EATING: 1-->ASSISTIVE EQUIPMENT
FALL_HISTORY_WITHIN_LAST_SIX_MONTHS: YES
TOILETING: 1-->ASSISTIVE EQUIPMENT

## 2017-01-01 ASSESSMENT — ENCOUNTER SYMPTOMS
PALPITATIONS: 0
SHORTNESS OF BREATH: 1
COUGH: 1

## 2017-01-01 ASSESSMENT — PAIN DESCRIPTION - DESCRIPTORS: DESCRIPTORS: OTHER (COMMENT)

## 2017-01-15 NOTE — TELEPHONE ENCOUNTER
Pending Prescriptions:                       Disp   Refills    simvastatin (ZOCOR) 40 MG tablet          90 tab*2            Sig: Take 1 tablet (40 mg) by mouth At Bedtime         Last Written Prescription Date: 04/21/2016  Last Fill Quantity: 90, # refills: 2  Last Office Visit with [facility], [facility] or [facility] prescribing provider: 11/01/2016       CHOL      132   11/1/2016  HDL       32   11/1/2016  LDL       65   11/1/2016  TRIG      173   11/1/2016  CHOLHDLRATIO      2.8   10/30/2015    Carson HANSONT

## 2017-03-13 NOTE — MR AVS SNAPSHOT
After Visit Summary   3/13/2017    Kat Gomez    MRN: [id number]           Patient Information     Date Of Birth          12/1/1928        Visit Information        Provider Department      3/13/2017 11:00 AM BEDOLLA TECH1 [facility]        Today's Diagnoses     Cardiac pacemaker in situ    -  1       Follow-ups after your visit        Your next 10 appointments already scheduled     May 03, 2017  9:00 AM CDT   Office Visit with Kelli Del Toro MD   [facility] ([facility])    [address] [place] [address]  [address]  [facility] [id number] [phone]           Bring a current list of meds and any records pertaining to this visit.  For Physicals, please bring immunization records and any forms needing to be filled out.  Please arrive 10 minutes early to complete paperwork.              Who to contact     If you have questions or need follow up information about today's clinic visit or your schedule please contact [facility] directly at [phone].  Normal or non-critical lab and imaging results will be communicated to you by Snakk Mediahart, letter or phone within 4 business days after the clinic has received the results. If you do not hear from us within 7 days, please contact the clinic through [email or web address] or phone. If you have a critical or abnormal lab result, we will notify you by phone as soon as possible.  Submit refill requests through [employer] or call your pharmacy and they will forward the refill request to us. Please allow 3 business days for your refill to be completed.          Additional Information About Your Visit        Snakk Mediahart Information     Mag[employer] gives you secure access to your electronic health record. If you see a primary care provider, you can also send messages to your care team and make appointments. If you have questions, please call your primary care clinic.  If you  do not have a primary care provider, please call [phone] and they will assist you.        Care EveryWhere ID     This is your Care EveryWhere ID. This could be used by other organizations to access your [place] medical records  [id number]         Blood Pressure from Last 3 Encounters:   10/31/16 124/72   09/22/16 110/70   08/24/16 122/78    Weight from Last 3 Encounters:   10/31/16 80.3 kg (177 lb 1 oz)   09/22/16 80.7 kg (178 lb)   08/24/16 83.9 kg (185 lb)              We Performed the Following     INTERROGATION DEVICE EVAL REMOTE, PACER/ICD (57682)     PM DEVICE INTERROGATE REMOTE (26168)        Primary Care Provider Office Phone # Fax #    Kelli Del Toro [phone]       [address] [place] [address]        Thank you!     Thank you for choosing [facility] [place]  for your care. Our goal is always to provide you with excellent care. Hearing back from our patients is one way we can continue to improve our services. Please take a few minutes to complete the written survey that you may receive in the mail after your visit with us. Thank you!             Your Updated Medication List - Protect others around you: Learn how to safely use, store and throw away your medicines at www.disposemymeds.org.          This list is accurate as of: 3/13/17  1:33 PM.  Always use your most recent med list.                   Brand Name Dispense Instructions for use    acetaminophen 325 MG tablet    TYLENOL     Take 2 tablets (650 mg) by mouth every 4 hours as needed for mild pain       alendronate 70 MG tablet    FOSAMAX    12 tablet    Take 1 tablet (70 mg) by mouth every 7 days Take with over 8 ounces water and stay upright for at least 30 minutes after dose.  Take at least 60 minutes before breakfast       amantadine HCl 100 MG Tabs     270 tablet    Take 1 tablet (100 mg) by mouth 3 times daily       aspirin 325 MG EC  tablet     100 tablet    Take 1 tablet by mouth daily.       clobetasol 0.05 % cream    TEMOVATE    60 g    Apply sparingly to affected area once daily as needed.  Do not apply to face.       gabapentin 100 MG capsule    NEURONTIN    180 capsule    Take 2 capsules (200 mg) by mouth At Bedtime 2 tablets at hs       hydrochlorothiazide 12.5 MG Tabs tablet     90 tablet    Take 1 tablet (12.5 mg) by mouth daily       metoprolol 25 MG tablet    LOPRESSOR    90 tablet    Take 0.5 tablets (12.5 mg) by mouth 2 times daily       NIFEdipine ER osmotic 60 MG Tb24    NIFEDICAL XL    90 tablet    Take 1 tablet (60 mg) by mouth daily       NYSTOP [id number] UNIT/GM Powd powder     15 g    Apply to affected area 2x daily       order for DME     1 Units    Equipment being ordered: Electric Wheelchair for home use.       simvastatin 40 MG tablet    ZOCOR    90 tablet    Take 1 tablet (40 mg) by mouth At Bedtime       trospium 60 MG Cp24 24 hr capsule    SANCTURA XR    90 each    Take 1 capsule (60 mg) by mouth every morning

## 2017-03-13 NOTE — PROGRESS NOTES
[employer]onic Advisa (D) Remote PPM Device Check  AP: 84 % : >99 %  Mode: DDDR        Presenting Rhythm: AP/  Heart Rate: Adequate rates per histogram  Sensing: Stable    Pacing Threshold: Stable    Impedance: Stable  Battery Status: 8.5-10.5 years  Atrial Arrhythmia: 36 mode switch episodes comprising <1% of the time. All episodes lasted <1 minute. 1 EGM shows As>Vs for PAT lasting 29 seconds, ventricular rates 110-170bpm.   Ventricular Arrhythmia: 2 ventricular high rates. EGMs show Vs>As for probable NSVT lasting 17-18 beats, rates 120-165bpm. EF 55-60% (2016). Reviewed findings with JOANNA Tesfaye.   Care Plan: F/u PPM Carelink q 3 months. Gave patient results over the phone. MYNOR MontgomeryT

## 2017-04-28 NOTE — TELEPHONE ENCOUNTER
Nifedical Xl      Last Written Prescription Date: 4/21/2016  Last Fill Quantity: 90, # refills: 3    Last Office Visit with [facility], P or [facility] prescribing provider:  11/01/2016   Future Office Visit:    Next 5 appointments (look out 90 days)     May 03, 2017  9:00 AM CDT   Office Visit with Kelli Del [facility] MD   [facility]    [address] [place] [id number]   [phone]                    BP Readings from Last 3 Encounters:   10/31/16 124/72   09/22/16 110/70   08/24/16 122/78     Prescription approved per [facility] Refill Protocol.    Ellie Gilmore, RN, BSN, PHN

## 2017-05-03 NOTE — NURSING NOTE
Chief Complaint   Patient presents with     RECHECK     f/u on medical issues       Initial /66 (BP Location: Right arm, Patient Position: Chair, Cuff Size: Adult Large)  Pulse 60  Temp 96.7  F (35.9  C) (Tympanic)  Ht 5' (1.524 m)  Wt 172 lb 3 oz (78.1 kg)  SpO2 95%  BMI 33.63 kg/m2 Estimated body mass index is 33.63 kg/(m^2) as calculated from the following:    Height as of this encounter: 5' (1.524 m).    Weight as of this encounter: 172 lb 3 oz (78.1 kg).  Medication Reconciliation: complete   Shirley Antoinette, CMA

## 2017-05-03 NOTE — PATIENT INSTRUCTIONS
You have an appointment with the heart specialist in June.      Please call vascular surgery to schedule a visit in September.  I don't think that Dr. Ana Bustamante is still with that practice, so you will likely see another provider.      Restart simvastatin 1/2 pill at night.  Please call me before you stop it again.      Can take senna (over the counter) to help with constipation 1-2 x daily.      Increase gabapentin to 300mg at bedtime.  I have sent in a new prescription.  Call me in 2-4 weeks if this is not improving the neuropathy

## 2017-05-03 NOTE — PROGRESS NOTES
"  SUBJECTIVE:                                                    Kat Gomez is a 88 year old female who presents to clinic today for the following health issues:    Follow up on medical issues    Hyperlipidemia Follow-Up      Rate your low fat/cholesterol diet?: fair    Taking statin?  No    Other lipid medications/supplements?:  none     Hypertension Follow-up      Outpatient blood pressures are not being checked.    Low Salt Diet: not monitoring salt     Vascular Disease Follow-up:  Coronary Artery Disease (CAD)      Chest pain or pressure, left side neck or arm pain: No    Shortness of breath/increased sweats/nausea with exertion: No    Pain in calves walking 1-2 blocks: No    Worsened or new symptoms since last visit: No    Nitroglycerin use: no    Daily aspirin use: Yes       Amount of exercise or physical activity: None    Problems taking medications regularly: No    Medication side effects: pt stopped simvastatin due to neuropathy in feet    Diet: regular (no restrictions)    Back pain:  Had \"stem cell\" treatment in lower back, has helped with back pain, \"vaginal problems\" and \"80% of my bladder is back\",    Ataxia:  Has good and bad days.  Using a rolling walker.  No falling.      Hearing aids:  Planning to get hearing impaired phone.      Stopped simvastatin and amantadine at night as she thought it made her neuropathy worse.      No chest pain or shortness of breath.  Does not wake at night short of breath.      Notes that she usually gets up 2 x at night to urinate.      Notes that neuropathy is worse at 3 am in the morning.  Does take 2 pills of gabapentin at night, will most nights take 2 more at 3 am.  She considers the neuropathy the most disturbing thing to her quality of life right now.        Problem list and histories reviewed & adjusted, as indicated.  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)     Personal history of traumatic fracture     HL (hearing loss)     Urinary, incontinence, stress female     Health Care Home     Aortic dissection, thoracic (H)     Aortic aneurysm (H)     Osteoporosis     CKD (chronic kidney disease) stage 3, GFR 30-59 ml/min     Obesity (BMI 30-39.9)     Prediabetes     Gout     Vitamin D deficiency disease     LISSET (obstructive sleep apnea)     Peripheral neuropathy, idiopathic     Coronary artery disease involving native coronary artery without angina pectoris     Cardiac pacemaker in situ     Past Surgical History:   Procedure Laterality Date     CATARACT IOL, RT/LT       CORONARY ANGIOGRAPHY ADULT ORDER      2003     CORONARY ARTERY BYPASS      SVG to LAD and SVG to RCA     CYSTOCELE REPAIR       ENDOVASCULAR REPAIR, INFRARENAL ABDOMINAL AORTIC ANEURYSM/DISSECTION; MODULAR BIFURCATED PROSTHESIS      Emergent repair of aorta and resuspension of aortic valve with two vessel bypass in 2003     FRACTURE TX, HIP RT/LT      Fracture TX Hip RT/LT     IMPLANT PACEMAKER  7/2016    dual chamber pacemaker via right subclavian vein without complication     REPLACE VALVE AORTIC      Bioprosthetic valve in 2003       Social History   Substance Use Topics     Smoking status: Never Smoker     Smokeless tobacco: Never Used     Alcohol use No      Comment: glass of wine once a week     Family History   Problem Relation Age of Onset     CEREBROVASCULAR DISEASE Mother      Prostate Cancer Father      Neurologic Disorder Sister      cerebellar ataxia     GASTROINTESTINAL DISEASE Sister      autoimmune hepatitis           Reviewed and updated as needed this visit by clinical staff  Tobacco  Allergies  Meds  Med Hx       Reviewed and updated as needed this visit by Provider         ROS:  Constitutional, HEENT, cardiovascular, pulmonary, gi and gu systems are negative, except as otherwise noted.    OBJECTIVE:                                                     /66 (BP Location: Right arm, Patient Position: Chair, Cuff Size: Adult Large)  Pulse 60  Temp 96.7  F (35.9  C) (Tympanic)  Ht 5' (1.524 m)  Wt 172 lb 3 oz (78.1 kg)  SpO2 95%  BMI 33.63 kg/m2  Body mass index is 33.63 kg/(m^2).  GENERAL: alert and no distress  EYES: Eyes grossly normal to inspection, PERRL and conjunctivae and sclerae normal  HENT: ear canals and TM's normal, nose and mouth without ulcers or lesions  NECK: no adenopathy, no asymmetry, masses,   RESP: lungs clear to auscultation - no rales, rhonchi or wheezes  CV: regular rate and rhythm, normal S1 S2, no S3 or S4, no murmur, click or rub, no peripheral edema   ABDOMEN: soft, nontender, no hepatosplenomegaly, no masses and bowel sounds normal- limited as has to be done sitting in a chair- ataxia prevents her from safely getting on exam table.    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 moura is no longer in that practice.     (G60.9) Peripheral neuropathy, idiopathic  -will try increasing gabapentin to 300mg at bedtime  -if this is not helpful she is to notify me in the next 2-4 weeks.  -we could then try increasing to 600mg at bedtime  -she can take an additional 300mg at 3  am if she wakes  Plan: gabapentin (NEURONTIN) 300 MG capsule          FUTURE APPOINTMENTS:       - Follow-up for annual visit or as needed- appointment scheduled.      Kelli Del Toro MD  [facility]

## 2017-05-03 NOTE — MR AVS SNAPSHOT
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      Please call vascular surgery to schedule a visit in September.  I don't think that Dr. Ana Bustamante is still with that practice, so you will likely see another provider.      Restart simvastatin 1/2 pill at night.  Please call me before you stop it again.      Can take senna (over the counter) to help with constipation 1-2 x daily.      Increase gabapentin to 300mg at bedtime.  I have sent in a new prescription.  Call me in 2-4 weeks if this is not improving the neuropathy        Follow-ups after your visit        Your next 10 appointments already scheduled     Jun 19, 2017  2:30 PM CDT   Remote PPM Check with BEDOLLA TECH1   [facility] [place] ([facility])    [address] [place] [address] [address]  [facility] [address] [phone]           This appointment is for a remote check of your pacemaker.  This is not an appointment at the office.            Nov 01, 2017  9:00 AM CDT   Office Visit with Kelli Del Toro MD   [facility] [facility]    [address]  [address]  [facility] [address] [phone]           Bring a current list of meds and any records pertaining to this visit.  For Physicals, please bring immunization records and any forms needing to be filled out.  Please arrive 10 minutes early to  complete paperwork.              Who to contact     If you have questions or need follow up information about today's clinic visit or your schedule please contact [facility] directly at [phone].  Normal or non-critical lab and imaging results will be communicated to you by O[employer] Sportstoyshart, letter or phone within 4 business days after the clinic has received the results. If you do not hear from us within 7 days, please contact the clinic through O[employer] or phone. If you have a critical or abnormal lab result, we will notify you by phone as soon as possible.  Submit refill requests through [employer] or call your pharmacy and they will forward the refill request to us. Please allow 3 business days for your refill to be completed.          Additional Information About Your Visit        [employer]ochimps Information     [employer] gives you secure access to your electronic health record. If you see a primary care provider, you can also send messages to your care team and make appointments. If you have questions, please call your primary care clinic.  If you do not have a primary care provider, please call [phone] and they will assist you.        Care EveryWhere ID     This is your Care EveryWhere ID. This could be used by other organizations to access your [place] medical records  [id number]        Your Vitals Were     Pulse Temperature Height Pulse Oximetry BMI (Body Mass Index)       60 96.7  F (35.9  C) (Tympanic) 5' (1.524 m) 95% 33.63 kg/m2        Blood Pressure from Last 3 Encounters:   04/27/17 118/66   10/31/16 124/72   09/22/16 110/70    Weight from Last 3 Encounters:   04/27/17 172 lb 3 oz (78.1 kg)   10/31/16 177 lb 1 oz (80.3 kg)   09/22/16 178 lb (80.7 kg)              Today, you had the following     No orders found for display         Today's Medication Changes          These changes are accurate as of: 5/3/17 10:02 AM.  If you have any questions, ask your nurse or doctor.               These medicines  have changed or have updated prescriptions.        Dose/Directions    amantadine HCl 100 MG Tabs   This may have changed:  See the new instructions.   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)   Changed by:  Kelli Del Toro MD        One pill in am   Quantity:  270 tablet   Refills:  2       gabapentin 300 MG capsule   Commonly known as:  NEURONTIN   This may have changed:    - medication strength  - how much to take  - additional instructions   Used for:  Peripheral neuropathy, idiopathic   Changed by:  Kelli Del Toro MD        Dose:  300 mg   Take 1 capsule (300 mg) by mouth At Bedtime   Quantity:  90 capsule   Refills:  3            Where to get your medicines      These medications were sent to [facility] - [address] [address]     Phone:  [phone]     gabapentin 300 MG capsule         Some of these will need a paper prescription and others can be bought over the counter.  Ask your nurse if you have questions.     You don't need a prescription for these medications     amantadine HCl 100 MG Tabs                Primary Care Provider Office Phone # Fax #    Kelli Del Toro [phone]       [address] [place] [address]        Thank you!     Thank you for choosing [facility]  for your care. Our goal is always to provide you with excellent care. Hearing back from our patients is one way we can continue to improve our services. Please take a few minutes to complete the written survey that you may receive in the mail after your visit with us. Thank you!             Your Updated Medication List - Protect others around you: Learn how to safely use, store and throw away your medicines at www.disposemymeds.org.          This list is accurate as of: 5/3/17 10:02 AM.  Always use your most recent med list.                   Brand Name  Dispense Instructions for use    acetaminophen 325 MG tablet    TYLENOL     Take 2 tablets (650 mg) by mouth every 4 hours as needed for mild pain       alendronate 70 MG tablet    FOSAMAX    12 tablet    Take 1 tablet (70 mg) by mouth every 7 days Take with over 8 ounces water and stay upright for at least 30 minutes after dose.  Take at least 60 minutes before breakfast       amantadine HCl 100 MG Tabs     270 tablet    One pill in am       aspirin 81 MG tablet      Take 81 mg by mouth daily       clobetasol 0.05 % cream    TEMOVATE    60 g    Apply sparingly to affected area once daily as needed.  Do not apply to face.       gabapentin 300 MG capsule    NEURONTIN    90 capsule    Take 1 capsule (300 mg) by mouth At Bedtime       hydrochlorothiazide 12.5 MG Tabs tablet     90 tablet    Take 1 tablet (12.5 mg) by mouth daily       metoprolol 25 MG tablet    LOPRESSOR    90 tablet    TAKE ONE-HALF (1/2) TABLET (12.5 MG) TWICE A DAY       NIFEdipine ER osmotic 60 MG Tb24    PROCARDIA XL    90 tablet    TAKE 1 TABLET DAILY       NYSTOP [id number] UNIT/GM Powd   Generic drug:  nystatin     15 g    Apply to affected area 2x daily       order for DME     1 Units    Equipment being ordered: Electric Wheelchair for home use.       trospium 60 MG Cp24 24 hr capsule    SANCTURA XR    90 each    Take 1 capsule (60 mg) by mouth every morning

## 2017-05-23 NOTE — PROGRESS NOTES
SUBJECTIVE:   Kat Gomez is a 88 year old female who  presents today for a possible UTI. Symptoms of dysuria have been going on for 2day(s).  Hematuria no.  sudden onset and still presentand moderate.  There is no history of fever, chills, nausea or vomiting.  No history of vaginal discharge. This patient does have a history of urinary tract infections. Patient denies long duration, rigors and flank pain.    Past Medical History:   Diagnosis Date     Adjustment disorder with depressed mood 9/25/2013     Aortic valve disorders     AVR 2003     Ascending aortic dissection (H)     s/p repair and resuspension of her native AV     Complete heart block (H) 7/19/2016     Coronary artery disease      Hyperlipidaemia      Hypertension      LISSET (obstructive sleep apnea)      Paroxysmal supraventricular tachycardia (H)      Strabismus      Syncope      Current Outpatient Prescriptions   Medication Sig Dispense Refill     aspirin 81 MG tablet Take 81 mg by mouth daily       amantadine HCl 100 MG TABS One pill in am 270 tablet 2     gabapentin (NEURONTIN) 300 MG capsule Take 1 capsule (300 mg) by mouth At Bedtime 90 capsule 3     NIFEdipine ER osmotic (PROCARDIA XL) 60 MG TB24 TAKE 1 TABLET DAILY 90 tablet 1     metoprolol (LOPRESSOR) 25 MG tablet TAKE ONE-HALF (1/2) TABLET (12.5 MG) TWICE A DAY 90 tablet 2     trospium (SANCTURA XR) 60 MG CP24 Take 1 capsule (60 mg) by mouth every morning 90 each 3     alendronate (FOSAMAX) 70 MG tablet Take 1 tablet (70 mg) by mouth every 7 days Take with over 8 ounces water and stay upright for at least 30 minutes after dose.  Take at least 60 minutes before breakfast 12 tablet 3     hydrochlorothiazide 12.5 MG TABS Take 1 tablet (12.5 mg) by mouth daily 90 tablet 3     clobetasol (TEMOVATE) 0.05 % cream Apply sparingly to affected area once daily as needed.  Do not apply to face. 60 g 1     Nystatin (NYSTOP) 258043 UNIT/GM POWD Apply to affected area 2x daily 15 g 1     acetaminophen  (TYLENOL) 325 MG tablet Take 2 tablets (650 mg) by mouth every 4 hours as needed for mild pain       ORDER FOR DME Equipment being ordered: Electric Wheelchair for home use. 1 Units 0     Social History   Substance Use Topics     Smoking status: Never Smoker     Smokeless tobacco: Never Used     Alcohol use No      Comment: glass of wine once a week       ROS:   Review of systems negative except as stated above.    OBJECTIVE:  /80 (BP Location: Right arm, Patient Position: Chair, Cuff Size: Adult Regular)  Pulse 89  Temp 96.3  F (35.7  C) (Temporal)  Wt 172 lb (78 kg)  SpO2 95%  BMI 33.59 kg/m2  GENERAL APPEARANCE: healthy, alert and no distress  BACK: No CVA tenderness    Results for orders placed or performed in visit on 05/23/17   UA reflex to Microscopic and Culture   Result Value Ref Range    Color Urine Yellow     Appearance Urine Clear     Glucose Urine Negative NEG mg/dL    Bilirubin Urine Negative NEG    Ketones Urine Negative NEG mg/dL    Specific Gravity Urine 1.015 1.003 - 1.035    Blood Urine Moderate (A) NEG    pH Urine 5.5 5.0 - 7.0 pH    Protein Albumin Urine Negative NEG mg/dL    Urobilinogen Urine 0.2 0.2 - 1.0 EU/dL    Nitrite Urine Positive (A) NEG    Leukocyte Esterase Urine Small (A) NEG    Source Midstream Urine    Urine Microscopic   Result Value Ref Range    WBC Urine 5-10 (A) 0 - 2 /HPF    RBC Urine 5-10 (A) 0 - 2 /HPF    Bacteria Urine Many (A) NEG /HPF     ASSESSMENT:   Lower, uncomplicated urinary tract infection.    PLAN:  Nitrofurantoin 100 mg PO BID x 7 days as per ordered above  Drink plenty of fluids.  Prevention and treatment of UTI's discussed.  Signs and symptoms of pyelonephritis mentioned.  Follow up with primary care physician if not improving

## 2017-05-23 NOTE — MR AVS SNAPSHOT
After Visit Summary   5/23/2017    Kat Gomez    MRN: [id number]           Patient Information     Date Of Birth          12/1/1928        Visit Information        Provider Department      5/23/2017 4:45 PM Yamila Gardiner MD [facility]        Today's Diagnoses     Urinary tract infection without hematuria, site unspecified    -  1    Dysuria           Follow-ups after your visit        Your next 10 appointments already scheduled     Jun 19, 2017  2:30 PM CDT   Remote PPM Check with BEDOLLA TECH1   [facility] [place] ([facility])    [address]  [place] [id number] [phone]           This appointment is for a remote check of your pacemaker.  This is not an appointment at the office.            Nov 01, 2017  9:00 AM CDT   [facility] Visit with [facility]elli [facility]o MD   [facility]    [address]  [address]  [place] [address] [phone]           Bring a current list of meds and any records pertaining to this visit.  For Physicals, please bring immunization records and any forms needing to be filled out.  Please arrive 10 minutes early to complete paperwork.              Who to contact     If you have questions or need follow up information about today's clinic visit or your schedule please contact [facility] directly at [phone].  Normal or non-critical lab and imaging results will be communicated to you by MyChart, letter or phone within 4 business days after the clinic has received the results. If you do not hear from us within 7 days, please contact the clinic through MyChart or phone. If you have a critical or abnormal lab result, we will notify you by phone as soon as possible.  Submit refill requests through Eve or call your pharmacy and they will forward the refill request to us. Please allow 3 business days for your refill  to be completed.          Additional Information About Your Visit        Swag Of The Monthhart Information     [employer] gives you secure access to your electronic health record. If you see a primary care provider, you can also send messages to your care team and make appointments. If you have questions, please call your primary care clinic.  If you do not have a primary care provider, please call [phone] and they will assist you.        Care EveryWhere ID     This is your Care EveryWhere ID. This could be used by other organizations to access your [place] medical records  [id number]        Your Vitals Were     Pulse Temperature Pulse Oximetry BMI (Body Mass Index)          89 96.3  F (35.7  C) (Temporal) 95% 33.59 kg/m2         Blood Pressure from Last 3 Encounters:   05/23/17 136/80   04/27/17 118/66   10/31/16 124/72    Weight from Last 3 Encounters:   05/23/17 172 lb (78 kg)   04/27/17 172 lb 3 oz (78.1 kg)   10/31/16 177 lb 1 oz (80.3 kg)              We Performed the Following     UA reflex to Microscopic and Culture     Urine Culture Aerobic Bacterial     Urine Microscopic          Today's Medication Changes          These changes are accurate as of: 5/23/17  5:32 PM.  If you have any questions, ask your nurse or doctor.               Start taking these medicines.        Dose/Directions    nitrofurantoin (macrocrystal-monohydrate) 100 MG capsule   Commonly known as:  MACROBID   Used for:  Urinary tract infection without hematuria, site unspecified   Started by:  Yamila Gardiner MD        Dose:  100 mg   Take 1 capsule (100 mg) by mouth 2 times daily for 7 days   Quantity:  14 capsule   Refills:  0            Where to get your medicines      These medications were sent to [facility] - [address]   [address]  [address], [place] [address]     Phone:  [phone]     nitrofurantoin (macrocrystal-monohydrate) 100 MG capsule                Primary Care Provider  Office Phone # Fax #    Kelli Del Toro [phone]       [address] [place] [address]        Thank you!     Thank you for choosing [facility]  for your care. Our goal is always to provide you with excellent care. Hearing back from our patients is one way we can continue to improve our services. Please take a few minutes to complete the written survey that you may receive in the mail after your visit with us. Thank you!             Your Updated Medication List - Protect others around you: Learn how to safely use, store and throw away your medicines at www.disposemymeds.org.          This list is accurate as of: 5/23/17  5:32 PM.  Always use your most recent med list.                   Brand Name Dispense Instructions for use    acetaminophen 325 MG tablet    TYLENOL     Take 2 tablets (650 mg) by mouth every 4 hours as needed for mild pain       alendronate 70 MG tablet    FOSAMAX    12 tablet    Take 1 tablet (70 mg) by mouth every 7 days Take with over 8 ounces water and stay upright for at least 30 minutes after dose.  Take at least 60 minutes before breakfast       amantadine HCl 100 MG Tabs     270 tablet    One pill in am       aspirin 81 MG tablet      Take 81 mg by mouth daily       clobetasol 0.05 % cream    TEMOVATE    60 g    Apply sparingly to affected area once daily as needed.  Do not apply to face.       gabapentin 300 MG capsule    NEURONTIN    90 capsule    Take 1 capsule (300 mg) by mouth At Bedtime       hydrochlorothiazide 12.5 MG Tabs tablet     90 tablet    Take 1 tablet (12.5 mg) by mouth daily       metoprolol 25 MG tablet    LOPRESSOR    90 tablet    TAKE ONE-HALF (1/2) TABLET (12.5 MG) TWICE A DAY       NIFEdipine ER osmotic 60 MG Tb24    PROCARDIA XL    90 tablet    TAKE 1 TABLET DAILY       nitrofurantoin (macrocrystal-monohydrate) 100 MG capsule    MACROBID    14 capsule    Take 1 capsule (100 mg) by mouth 2  times daily for 7 days       NYSTOP [id number] UNIT/GM Powd   Generic drug:  nystatin     15 g    Apply to affected area 2x daily       order for DME     1 Units    Equipment being ordered: Electric Wheelchair for home use.       trospium 60 MG Cp24 24 hr capsule    SANCTURA XR    90 each    Take 1 capsule (60 mg) by mouth every morning

## 2017-05-23 NOTE — NURSING NOTE
Chief Complaint   Patient presents with     Urgent Care     Urinary Problem     Pt states has had painful urination today.        Initial /80 (BP Location: Right arm, Patient Position: Chair, Cuff Size: Adult Regular)  Pulse 89  Temp 96.3  F (35.7  C) (Temporal)  Wt 172 lb (78 kg)  SpO2 95%  BMI 33.59 kg/m2 Estimated body mass index is 33.59 kg/(m^2) as calculated from the following:    Height as of 4/27/17: 5' (1.524 m).    Weight as of this encounter: 172 lb (78 kg).  Medication Reconciliation: unable or not appropriate to perform   Nohemy Mcneal CMA (AAMA) 5/23/2017 5:03 PM

## 2017-06-04 NOTE — MR AVS SNAPSHOT
After Visit Summary   6/4/2017    Kat Gomez    MRN: [id number]           Patient Information     Date Of Birth          12/1/1928        Visit Information        Provider Department      6/4/2017 5:40 PM Dinah Villarreal PA-C [facility]        Today's Diagnoses     Urinary tract infection without hematuria, site unspecified    -  1    Dysuria        Nonspecific finding on examination of urine           Follow-ups after your visit        Your next 10 appointments already scheduled     Jun 19, 2017  2:30 PM CDT   Remote PPM Check with BEDOLLA TECH1   [facility] AT [place] ([facility])    [address]  [place] [id number] [phone]           This appointment is for a remote check of your pacemaker.  This is not an appointment at the office.            Nov 01, 2017  9:00 AM CDT   Office Visit with Kelli Del Toro MD   [facility] ([facility])    [address]  [address]  [place] [address] [phone]           Bring a current list of meds and any records pertaining to this visit.  For Physicals, please bring immunization records and any forms needing to be filled out.  Please arrive 10 minutes early to complete paperwork.              Who to contact     If you have questions or need follow up information about today's clinic visit or your schedule please contact [facility] directly at [phone].  Normal or non-critical lab and imaging results will be communicated to you by MyChart, letter or phone within 4 business days after the clinic has received the results. If you do not hear from us within 7 days, please contact the clinic through MyChart or phone. If you have a critical or abnormal lab result, we will notify you by phone as soon as possible.  Submit refill requests through Movity or call your pharmacy and they will forward the refill  request to us. Please allow 3 business days for your refill to be completed.          Additional Information About Your Visit        MyChart Information     [email or web address] gives you secure access to your electronic health record. If you see a primary care provider, you can also send messages to your care team and make appointments. If you have questions, please call your primary care clinic.  If you do not have a primary care provider, please call [phone] and they will assist you.        Care EveryWhere ID     This is your Care EveryWhere ID. This could be used by other organizations to access your [place] medical records  [id number]        Your Vitals Were     Pulse Temperature Height Pulse Oximetry BMI (Body Mass Index)       86 97.6  F (36.4  C) (Oral) 5' (1.524 m) 97% 34.18 kg/m2        Blood Pressure from Last 3 Encounters:   06/04/17 122/70   05/23/17 136/80   04/27/17 118/66    Weight from Last 3 Encounters:   06/04/17 175 lb (79.4 kg)   05/23/17 172 lb (78 kg)   04/27/17 172 lb 3 oz (78.1 kg)              We Performed the Following     *UA reflex to Microscopic and Culture ([place] [facility] (except [place] and [place])     Urine Culture Aerobic Bacterial     Urine Microscopic          Today's Medication Changes          These changes are accurate as of: 6/4/17  7:11 PM.  If you have any questions, ask your nurse or doctor.               Start taking these medicines.        Dose/Directions    sulfamethoxazole-trimethoprim 800-160 MG per tablet   Commonly known as:  BACTRIM DS/SEPTRA DS   Used for:  Urinary tract infection without hematuria, site unspecified   Started by:  Dinah Villarreal PA-C        Dose:  1 tablet   Take 1 tablet by mouth 2 times daily   Quantity:  14 tablet   Refills:  0            Where to get your medicines      These medications were sent to [facility] [address] [place] [address] - [address] AT [place] [address]  [address]  [place] [address]    Hours:  24-hours Phone:  [phone]     sulfamethoxazole-trimethoprim 800-160 MG per tablet                Primary Care Provider Office Phone # Fax #    Kelli Del Toro [phone]       [facility] [address] [place] [address]        Thank you!     Thank you for choosing [facility]  for your care. Our goal is always to provide you with excellent care. Hearing back from our patients is one way we can continue to improve our services. Please take a few minutes to complete the written survey that you may receive in the mail after your visit with us. Thank you!             Your Updated Medication List - Protect others around you: Learn how to safely use, store and throw away your medicines at www.disposemymeds.org.          This list is accurate as of: 6/4/17  7:11 PM.  Always use your most recent med list.                   Brand Name Dispense Instructions for use    acetaminophen 325 MG tablet    TYLENOL     Take 2 tablets (650 mg) by mouth every 4 hours as needed for mild pain       alendronate 70 MG tablet    FOSAMAX    12 tablet    Take 1 tablet (70 mg) by mouth every 7 days Take with over 8 ounces water and stay upright for at least 30 minutes after dose.  Take at least 60 minutes before breakfast       amantadine HCl 100 MG Tabs     270 tablet    One pill in am       aspirin 81 MG tablet      Take 81 mg by mouth daily       clobetasol 0.05 % cream    TEMOVATE    60 g    Apply sparingly to affected area once daily as needed.  Do not apply to face.       gabapentin 300 MG capsule    NEURONTIN    90 capsule    Take 1 capsule (300 mg) by mouth At Bedtime       hydrochlorothiazide 12.5 MG Tabs tablet     90 tablet    Take 1 tablet (12.5 mg) by mouth daily       metoprolol 25 MG tablet    LOPRESSOR    90 tablet    TAKE ONE-HALF (1/2) TABLET (12.5 MG) TWICE A DAY       NIFEdipine ER osmotic 60 MG Tb24    PROCARDIA XL    90  tablet    TAKE 1 TABLET DAILY       NYSTOP [id number] UNIT/GM Powd   Generic drug:  nystatin     15 g    Apply to affected area 2x daily       order for DME     1 Units    Equipment being ordered: Electric Wheelchair for home use.       sulfamethoxazole-trimethoprim 800-160 MG per tablet    BACTRIM DS/SEPTRA DS    14 tablet    Take 1 tablet by mouth 2 times daily       trospium 60 MG Cp24 24 hr capsule    SANCTURA XR    90 each    Take 1 capsule (60 mg) by mouth every morning

## 2017-06-05 NOTE — PROGRESS NOTES
SUBJECTIVE:   Kat Gomez is a 88 year old female who  presents today for a possible UTI. Symptoms of dysuria, frequency, burning and suprapubic pain and pressure have been going on for 2day(s).  Hematuria no.  gradual onset and still presentand mild.  There is no history of fever, chills, nausea or vomiting.  No history of vaginal  discharge. This patient does  have a history of urinary tract infections. Patient denies long duration, rigors, flank pain, temperature > 101 degrees F., Vomiting, significant nausea or diarrhea and taking Coumadin or vaginal discharge, vaginal odor and vaginal itching     Past Medical History:   Diagnosis Date     Adjustment disorder with depressed mood 9/25/2013     Aortic valve disorders     AVR 2003     Ascending aortic dissection (H)     s/p repair and resuspension of her native AV     Complete heart block (H) 7/19/2016     Coronary artery disease      Hyperlipidaemia      Hypertension      LISSET (obstructive sleep apnea)      Paroxysmal supraventricular tachycardia (H)      Strabismus      Syncope      Current Outpatient Prescriptions   Medication Sig Dispense Refill     sulfamethoxazole-trimethoprim (BACTRIM DS/SEPTRA DS) 800-160 MG per tablet Take 1 tablet by mouth 2 times daily 14 tablet 0     aspirin 81 MG tablet Take 81 mg by mouth daily       amantadine HCl 100 MG TABS One pill in am 270 tablet 2     gabapentin (NEURONTIN) 300 MG capsule Take 1 capsule (300 mg) by mouth At Bedtime 90 capsule 3     NIFEdipine ER osmotic (PROCARDIA XL) 60 MG TB24 TAKE 1 TABLET DAILY 90 tablet 1     metoprolol (LOPRESSOR) 25 MG tablet TAKE ONE-HALF (1/2) TABLET (12.5 MG) TWICE A DAY 90 tablet 2     trospium (SANCTURA XR) 60 MG CP24 Take 1 capsule (60 mg) by mouth every morning 90 each 3     alendronate (FOSAMAX) 70 MG tablet Take 1 tablet (70 mg) by mouth every 7 days Take with over 8 ounces water and stay upright for at least 30 minutes after dose.  Take at least 60 minutes before  breakfast 12 tablet 3     hydrochlorothiazide 12.5 MG TABS Take 1 tablet (12.5 mg) by mouth daily 90 tablet 3     clobetasol (TEMOVATE) 0.05 % cream Apply sparingly to affected area once daily as needed.  Do not apply to face. 60 g 1     Nystatin (NYSTOP) [id number] UNIT/GM POWD Apply to affected area 2x daily 15 g 1     acetaminophen (TYLENOL) 325 MG tablet Take 2 tablets (650 mg) by mouth every 4 hours as needed for mild pain       ORDER FOR DME Equipment being ordered: Electric Wheelchair for home use. 1 Units 0     Social History   Substance Use Topics     Smoking status: Never Smoker     Smokeless tobacco: Never Used     Alcohol use No      Comment: glass of wine once a week       ROS:   Review of systems negative except as stated above.    OBJECTIVE:  /70 (BP Location: Right arm, Patient Position: Chair, Cuff Size: Adult Regular)  Pulse 86  Temp 97.6  F (36.4  C) (Oral)  Ht 5' (1.524 m)  Wt 175 lb (79.4 kg)  SpO2 97%  BMI 34.18 kg/m2  GENERAL APPEARANCE: healthy, alert and no distress  RESP: lungs clear to auscultation - no rales, rhonchi or wheezes  CV: regular rates and rhythm, normal S1 S2, no murmur noted  ABDOMEN:  soft, nontender, no HSM or masses and bowel sounds normal  BACK: No CVA tenderness  SKIN: no suspicious lesions or rashes    Results for orders placed or performed in visit on 06/04/17   *UA reflex to Microscopic and Culture ([place] [facility] (except [place] and [place])   Result Value Ref Range    Color Urine Yellow     Appearance Urine Slightly Cloudy     Glucose Urine Negative NEG mg/dL    Bilirubin Urine Negative NEG    Ketones Urine Negative NEG mg/dL    Specific Gravity Urine 1.015 1.003 - 1.035    Blood Urine Moderate (A) NEG    pH Urine 5.5 5.0 - 7.0 pH    Protein Albumin Urine Negative NEG mg/dL    Urobilinogen Urine 0.2 0.2 - 1.0 EU/dL    Nitrite Urine Negative NEG    Leukocyte Esterase Urine Large (A) NEG    Source Midstream Urine    Urine Microscopic   Result  Value Ref Range    WBC Urine 5-10  Clumps of WBC's seen   (A) 0 - 2 /HPF    RBC Urine 5-10 (A) 0 - 2 /HPF    Squamous Epithelial /LPF Urine Few FEW /LPF    Bacteria Urine Few (A) NEG /HPF    Mucous Urine Present (A) NEG /LPF         ASSESSMENT:   Lower, uncomplicated urinary tract infection.    PLAN:  Culture pending.  Bactrim as directed.    Drink plenty of fluids.  Prevention and treatment of UTI's discussed.Signs and symptoms of pyelonephritis mentioned.  Follow up with primary care physician if not improving

## 2017-06-07 NOTE — PROGRESS NOTES
Called pt informed of  Results,  Informed pt positive for 2 bacteria,  Currently pt reported symptoms are improving with Bactrim, if any change with symptoms can switch  To macrobid// pt assistant  verbalized understanding //Tram Savage MA// June 7, 2017 10:16 AM

## 2017-06-09 NOTE — TELEPHONE ENCOUNTER
Called patient to follow up, patient states is doing much better on medication and has 2 days left on medication. Informed patient to give us a call back if symptoms return and we can call in macrobid 100 mg BID X7 days.  Nohemy Mcneal CMA (AAMA) 6/9/2017 10:55 AM

## 2017-06-19 NOTE — MR AVS SNAPSHOT
After Visit Summary   6/19/2017    Kat Gomez    MRN: [id number]           Patient Information     Date Of Birth          12/1/1928        Visit Information        Provider Department      6/19/2017 2:30 PM BEDOLLA TECH1 [facility]        Today's Diagnoses     Cardiac pacemaker in situ    -  1       Follow-ups after your visit        Your next 10 appointments already scheduled     Jun 19, 2017  2:30 PM CDT   Remote PPM Check with BEDOLLA TECH1   [facility] ([facility])    [address] [place], [place] [address]  [facility] [address] [phone]           This appointment is for a remote check of your pacemaker.  This is not an appointment at the office.            Sep 29, 2017 10:30 AM CDT   Pacemaker Check with BEDOLLA DCRN   [facility] ([facility])    [address] [place] [address] [phone]            Sep 29, 2017 11:15 AM CDT   Return Visit with Lucius Jiménez MD   [facility] ([facility])    [address] [place] [address] [phone]            Nov 01, 2017  9:00 AM CDT   Office Visit with Kelli Del Toro MD   [facility] ([facility])    [address] [place] [address]  [address]  [facility] [id number] [phone]           Bring a current list of meds and any records pertaining to this visit.  For Physicals, please bring immunization records and any forms needing to be filled out.  Please arrive 10 minutes early to complete paperwork.              Who to contact     If you have questions or need follow up information about today's clinic visit or your schedule please contact [facility] directly at [phone].  Normal or non-critical lab and imaging results will be communicated to you by  MyChart, letter or phone within 4 business days after the clinic has received the results. If you do not hear from us within 7 days, please contact the clinic through [employer] or phone. If you have a critical or abnormal lab result, we will notify you by phone as soon as possible.  Submit refill requests through [employer] or call your pharmacy and they will forward the refill request to us. Please allow 3 business days for your refill to be completed.          Additional Information About Your Visit        [email or web address] Information     [employer] gives you secure access to your electronic health record. If you see a primary care provider, you can also send messages to your care team and make appointments. If you have questions, please call your primary care clinic.  If you do not have a primary care provider, please call [phone] and they will assist you.        Care EveryWhere ID     This is your Care EveryWhere ID. This could be used by other organizations to access your [facility] medical records  [id number]         Blood Pressure from Last 3 Encounters:   06/04/17 122/70   05/23/17 136/80   04/27/17 118/66    Weight from Last 3 Encounters:   06/04/17 79.4 kg (175 lb)   05/23/17 78 kg (172 lb)   04/27/17 78.1 kg (172 lb 3 oz)              We Performed the Following     INTERROGATION DEVICE EVAL REMOTE, PACER/ICD (60401)     PM DEVICE INTERROGATE REMOTE (64225)        Primary Care Provider Office Phone # Fax #    Kelli Del Toro [phone]       [address] [place] [address]        Thank you!     Thank you for choosing [facility] [place]  for your care. Our goal is always to provide you with excellent care. Hearing back from our patients is one way we can continue to improve our services. Please take a few minutes to complete the written survey that you may receive in the mail after your visit with us. Thank you!              Your Updated Medication List - Protect others around you: Learn how to safely use, store and throw away your medicines at www.disposemymeds.org.          This list is accurate as of: 6/19/17 11:45 AM.  Always use your most recent med list.                   Brand Name Dispense Instructions for use    acetaminophen 325 MG tablet    TYLENOL     Take 2 tablets (650 mg) by mouth every 4 hours as needed for mild pain       alendronate 70 MG tablet    FOSAMAX    12 tablet    Take 1 tablet (70 mg) by mouth every 7 days Take with over 8 ounces water and stay upright for at least 30 minutes after dose.  Take at least 60 minutes before breakfast       amantadine HCl 100 MG Tabs     270 tablet    One pill in am       aspirin 81 MG tablet      Take 81 mg by mouth daily       clobetasol 0.05 % cream    TEMOVATE    60 g    Apply sparingly to affected area once daily as needed.  Do not apply to face.       gabapentin 300 MG capsule    NEURONTIN    90 capsule    Take 1 capsule (300 mg) by mouth At Bedtime       hydrochlorothiazide 12.5 MG Tabs tablet     90 tablet    Take 1 tablet (12.5 mg) by mouth daily       metoprolol 25 MG tablet    LOPRESSOR    90 tablet    TAKE ONE-HALF (1/2) TABLET (12.5 MG) TWICE A DAY       NIFEdipine ER osmotic 60 MG Tb24    PROCARDIA XL    90 tablet    TAKE 1 TABLET DAILY       NYSTOP 005895 UNIT/GM Powd   Generic drug:  nystatin     15 g    Apply to affected area 2x daily       order for DME     1 Units    Equipment being ordered: Electric Wheelchair for home use.       sulfamethoxazole-trimethoprim 800-160 MG per tablet    BACTRIM DS/SEPTRA DS    14 tablet    Take 1 tablet by mouth 2 times daily       trospium 60 MG Cp24 24 hr capsule    SANCTURA XR    90 each    Take 1 capsule (60 mg) by mouth every morning

## 2017-06-19 NOTE — TELEPHONE ENCOUNTER
Dr. Jiménez: 34 minute episode of Afib found on remote pacemaker check, pt currently not taking anticoagulants or ASA. Any changes?  Medtronic Advisa MRI A2DR01 (D) Remote PPM Device Check  AP: 93% : 99%  Mode: DDDR        Presenting Rhythm: AP/  Heart Rate: adequate heart rates per histogram  Sensing: stable    Pacing Threshold: stable    Impedance: stable  Battery Status: 7 - 9 years remaining  Atrial Arrhythmia: 23 mode switch episodes comprising <0.1% of the time. 1 EGM available showing Afib lasting 34 min. Vent rates > 100bpm while in mode switch. Pt stated she stopped her ASA due to nose bleeds but planned to start it again at half dose. Will update Dr Jiménez with current findings.    Ventricular Arrhythmia: 1 vent high rate. EGM shows Vs > As lasting 8 beats, rate 155bpm.  EF 55 - 60%. Reviewed findings with Jen     Care Plan: Annual threshold and OV scheduled in September. Gave results over the phone to pt and her son. Armani CVT

## 2017-06-19 NOTE — PROGRESS NOTES
Medtronic Advisa MRI A2DR01 (D) Remote PPM Device Check  AP: 93% : 99%  Mode: DDDR        Presenting Rhythm: AP/  Heart Rate: adequate heart rates per histogram  Sensing: stable    Pacing Threshold: stable    Impedance: stable  Battery Status: 7 - 9 years remaining  Atrial Arrhythmia: 23 mode switch episodes comprising <0.1% of the time. 1 EGM available showing Afib lasting 34 min. Vent rates > 100bpm while in mode switch. Pt stated she stopped her ASA due to nose bleeds but planned to start it again at half dose. Will update Dr Jiménez with current findings.    Ventricular Arrhythmia: 1 vent high rate. EGM shows Vs > As lasting 8 beats, rate 155bpm.  EF 55 - 60%. Reviewed findings with Jen     Care Plan: Annual threshold and OV scheduled in September. Gave results over the phone to pt and her son. Armani CVT

## 2017-09-13 NOTE — LETTER
[facility] [address]  [place] [address]  Phone: [phone]  Fax: [phone]        2017    RE:  Kat PEREZ Patricia-:  [birth year]    87 y/o female s/p Type A aortic dissection 2003 with repair aortic valve and CABG x 2 now here fir F/U.  Dissection extends from the thoracic aorta to her iliac vessels.  She is completely asymptomatic--there has been no significant change in diameter at thoracic or aortic levels.  No indication for intervention discussed in detail with the patient.  F/U one year with CTA chest/abdomen and pelvis.  Continue present medications.      MARC Marcos III, MD

## 2017-09-13 NOTE — NURSING NOTE
Chief Complaint   Patient presents with     Consult     History of aortic dissection.    1 yr F/U       Initial /68 (BP Location: Right arm, Cuff Size: Adult Regular)  Pulse 79  Ht 5' (1.524 m)  Wt 175 lb (79.4 kg)  SpO2 93%  Breastfeeding? No  BMI 34.18 kg/m2 Estimated body mass index is 34.18 kg/(m^2) as calculated from the following:    Height as of this encounter: 5' (1.524 m).    Weight as of this encounter: 175 lb (79.4 kg).  Medication Reconciliation: complete     Irma Mott  CMA

## 2017-09-13 NOTE — MR AVS SNAPSHOT
After Visit Summary   9/13/2017    Kat Gomez    MRN: [id number]           Patient Information     Date Of Birth          12/1/1928        Visit Information        Provider Department      9/13/2017 11:00 AM Ridge Marcos MD [facility] General [facility]      Today's Diagnoses     Aortic dissection, thoracic (H)    -  1       Follow-ups after your visit        Your next 10 appointments already scheduled     Sep 29, 2017 10:30 AM CDT   Pacemaker Check with CHIOMA SCOTT   [facility] [place] ([facility])    [address] [place] [address] [address]  [facility] [address]   [phone]            Sep 29, 2017 11:15 AM CDT   Return Visit with Lucius Jiménez MD   [facility] ([facility])    [address] [place], [place] [address]  [facility] [address]   [phone]            Nov 01, 2017  9:00 AM CDT   Office Visit with Kelli Del Toro MD   [facility] ([facility])    [address] [place] [address]  [address]  [facility] [address] [phone]           Bring a current list of meds and any records pertaining to this visit. For Physicals, please bring immunization records and any forms needing to be filled out. Please arrive 10 minutes early to complete paperwork.              Who to contact     If you have questions or need follow up information about today's clinic visit or your schedule please contact [facility] directly at [phone].  Normal or non-critical lab and imaging results will be communicated to you by MyChart, letter or phone within 4 business days after the clinic has received the results. If you do not hear from us within 7 days, please contact the clinic through MyChart or phone. If you have a critical or abnormal lab result, we will notify you by phone as soon as possible.  Submit refill requests  through [employer] or call your pharmacy and they will forward the refill request to us. Please allow 3 business days for your refill to be completed.          Additional Information About Your Visit        [employer] Information     [employer] gives you secure access to your electronic health record. If you see a primary care provider, you can also send messages to your care team and make appointments. If you have questions, please call your primary care clinic.  If you do not have a primary care provider, please call [phone] and they will assist you.        Care EveryWhere ID     This is your Care EveryWhere ID. This could be used by other organizations to access your [place] medical records  [id number]        Your Vitals Were     Pulse Height Pulse Oximetry Breastfeeding? BMI (Body Mass Index)       79 5' (1.524 m) 93% No 34.18 kg/m2        Blood Pressure from Last 3 Encounters:   09/13/17 104/68   06/04/17 122/70   05/23/17 136/80    Weight from Last 3 Encounters:   09/13/17 175 lb (79.4 kg)   06/04/17 175 lb (79.4 kg)   05/23/17 172 lb (78 kg)              Today, you had the following     No orders found for display       Primary Care Provider Office Phone # Fax #    Kelli Del Toro [phone] [address] [place] [address]        Equal Access to Services     [facility]: Hadii aad ku hadasho Soomaali, waaxda luqadaha, qaybta kaalmada adeegyada, cliff montanez. So [facility] [phone].    ATENCIÓN: Si habla español, tiene a watts disposición servicios gratuitos de asistencia lingüística. Prabhakar al [phone].    We comply with applicable federal civil rights laws and [place] laws. We do not discriminate on the basis of race, color, national origin, age, disability sex, sexual orientation or gender identity.            Thank you!     Thank you for choosing [facility]  for your care. Our goal is always to provide you with  excellent care. Hearing back from our patients is one way we can continue to improve our services. Please take a few minutes to complete the written survey that you may receive in the mail after your visit with us. Thank you!             Your Updated Medication List - Protect others around you: Learn how to safely use, store and throw away your medicines at www.disposemymeds.org.          This list is accurate as of: 9/13/17 11:59 PM.  Always use your most recent med list.                   Brand Name Dispense Instructions for use Diagnosis    acetaminophen 325 MG tablet    TYLENOL     Take 2 tablets (650 mg) by mouth every 4 hours as needed for mild pain    S/P cardiac pacemaker procedure       alendronate 70 MG tablet    FOSAMAX    12 tablet    Take 1 tablet (70 mg) by mouth every 7 days Take with over 8 ounces water and stay upright for at least 30 minutes after dose.  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  Do not apply to face.    Vaginal itching       gabapentin 300 MG capsule    NEURONTIN    90 capsule    Take 1 capsule (300 mg) by mouth At Bedtime    Peripheral neuropathy, idiopathic       hydrochlorothiazide 12.5 MG Tabs tablet     90 tablet    Take 1 tablet (12.5 mg) by mouth daily    Benign essential hypertension       metoprolol 25 MG tablet    LOPRESSOR    90 tablet    TAKE ONE-HALF (1/2) TABLET (12.5 MG) TWICE A DAY    Essential hypertension, benign       NIFEdipine ER osmotic 60 MG Tb24    PROCARDIA XL    90 tablet    TAKE 1 TABLET DAILY    Essential hypertension, benign       NYSTOP [id number] UNIT/GM Powd   Generic drug:  nystatin     15 g    Apply to affected area 2x daily    Yeast infection of the skin        order for DME     1 Units    Equipment being ordered: Electric Wheelchair for home use.    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

## 2017-09-14 NOTE — PROGRESS NOTES
89 y/o female s/p Type A aortic dissection 2003 with repair aortic valve and CABG x 2 now here fir F/U.  Dissection extends from the thoracic aorta to her iliac vessels.  She is completely asymptomatic--there has been no significant change in diameter at thoracic or aortic levels.  No indication for intervention discussed in detail with the patient.  F/U one year with CTA chest/abdomen and pelvis.  Continue present medications.  Face to face time 15 minutes greater than 50% in consultation.t

## 2017-09-29 NOTE — MR AVS SNAPSHOT
After Visit Summary   9/29/2017    Kat Gomez    MRN: [id number]           Patient Information     Date Of Birth          12/1/1928        Visit Information        Provider Department      9/29/2017 10:30 AM BEDOLLA DCRN [facility]        Today's Diagnoses     Cardiac pacemaker in situ    -  1       Follow-ups after your visit        Your next 10 appointments already scheduled     Sep 29, 2017 11:15 AM CDT   Return Visit with Lucius Jiménez MD   [facility] ([facility])    [address]  [facility] [address] [phone]            Nov 01, 2017  9:00 AM CDT   Office Visit with Kelli Del Toro MD   [facility] ([facility])    [address]  [address]  [facility] [address] [phone]           Bring a current list of meds and any records pertaining to this visit. For Physicals, please bring immunization records and any forms needing to be filled out. Please arrive 10 minutes early to complete paperwork.            Jan 09, 2018 11:45 AM CST   Remote PPM Check with BEDOLLA TECH1   [facility] ([facility])    [address]  [facility] [address] [phone]           This appointment is for a remote check of your pacemaker.  This is not an appointment at the office.              Who to contact     If you have questions or need follow up information about today's clinic visit or your schedule please contact [facility] directly at [phone].  Normal or non-critical lab and imaging results will be communicated to you by MyChart, letter or phone within 4 business days after the clinic has received the results. If you do not hear from us within 7 days, please contact the clinic through MyChart or phone. If you have a critical or  abnormal lab result, we will notify you by phone as soon as possible.  Submit refill requests through [employer] or call your pharmacy and they will forward the refill request to us. Please allow 3 business days for your refill to be completed.          Additional Information About Your Visit        [employer] Information     [employer] gives you secure access to your electronic health record. If you see a primary care provider, you can also send messages to your care team and make appointments. If you have questions, please call your primary care clinic.  If you do not have a primary care provider, please call [phone] and they will assist you.        Care EveryWhere ID     This is your Care EveryWhere ID. This could be used by other organizations to access your [place] medical records  [id number]         Blood Pressure from Last 3 Encounters:   09/13/17 104/68   06/04/17 122/70   05/23/17 136/80    Weight from Last 3 Encounters:   09/13/17 79.4 kg (175 lb)   06/04/17 79.4 kg (175 lb)   05/23/17 78 kg (172 lb)              We Performed the Following     PM DEVICE PROGRAMMING EVAL, DUAL LEAD PACER (10433)        Primary Care Provider Office Phone # Fax #    Kelli Del Toro [phone] [address] [place] [address]        Equal Access to Services     [facility]: Hadii aad ku hadasho Soomaali, waaxda luqadaha, qaybta kaalmada adeegyada, waxay idiin hayaan isabel rodriguez . So [facility] [phone].    ATENCIÓN: Si habla español, tiene a watts disposición servicios gratuitos de asistencia lingüística. Llame al [phone].    We comply with applicable federal civil rights laws and [place] laws. We do not discriminate on the basis of race, color, national origin, age, disability sex, sexual orientation or gender identity.            Thank you!     Thank you for choosing [facility] [place]  for your care. Our goal is always to provide you  with excellent care. Hearing back from our patients is one way we can continue to improve our services. Please take a few minutes to complete the written survey that you may receive in the mail after your visit with us. Thank you!             Your Updated Medication List - Protect others around you: Learn how to safely use, store and throw away your medicines at www.disposemymeds.org.          This list is accurate as of: 9/29/17 11:09 AM.  Always use your most recent med list.                   Brand Name Dispense Instructions for use Diagnosis    acetaminophen 325 MG tablet    TYLENOL     Take 2 tablets (650 mg) by mouth every 4 hours as needed for mild pain    S/P cardiac pacemaker procedure       alendronate 70 MG tablet    FOSAMAX    12 tablet    Take 1 tablet (70 mg) by mouth every 7 days Take with over 8 ounces water and stay upright for at least 30 minutes after dose.  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  Do not apply to face.    Vaginal itching       gabapentin 300 MG capsule    NEURONTIN    90 capsule    Take 1 capsule (300 mg) by mouth At Bedtime    Peripheral neuropathy, idiopathic       hydrochlorothiazide 12.5 MG Tabs tablet     90 tablet    Take 1 tablet (12.5 mg) by mouth daily    Benign essential hypertension       metoprolol 25 MG tablet    LOPRESSOR    90 tablet    TAKE ONE-HALF (1/2) TABLET (12.5 MG) TWICE A DAY    Essential hypertension, benign       NIFEdipine ER osmotic 60 MG Tb24    PROCARDIA XL    90 tablet    TAKE 1 TABLET DAILY    Essential hypertension, benign       NYSTOP [id number] UNIT/GM Powd   Generic drug:  nystatin     15 g    Apply to affected area 2x daily    Yeast infection of the skin        order for DME     1 Units    Equipment being ordered: Electric Wheelchair for home use.    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

## 2017-09-29 NOTE — PROGRESS NOTES
Cardiology Progress Note          Assessment and Plan:     1. Paroxysmal atrial fibrillation and nonsustained ventricular tachycardia seen on device check    Trial of Eliquis 2.5 mg twice daily for stroke prophylaxis    If patient cannot tolerate it secondary to nosebleeds, may need to see ENT or discontinue.    Increase metoprolol from 12.5 mg twice daily up to 25mg twice daily to help with the tachycardia    Follow-up in three months to reassess      2. 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      3. Ascending aortic dissection and repair and resuspension of her native AV    Distal left subclavian point of origin and retrograded back into the ascending aorta.      This note was transcribed using electronic voice recognition software and there may be typographical errors present.                Interval History:   The patient is a very pleasant 88 year old whom I have been following for aortic dissection and resuspension of her native aortic valve, stable on CTA. She also had sick sinus syndrome requiring pacemaker implantation.  Her pacemaker checked today shows paroxysmal atrial fibrillation and paroxysmal nonsustained ventricular tachycardia, both with heart rates above 100 bpm.  She denies any unsteadiness or recent falls.  She stopped her aspirin secondary to a nosebleed.                       Review of Systems:   Review of Systems:  Skin:  Negative     Eyes:  Positive for glasses  ENT:  Positive for hearing loss  Respiratory:  Negative    Cardiovascular:  Negative    Gastroenterology: Negative    Genitourinary:  not assessed    Musculoskeletal:  Negative    Neurologic:  Negative    Psychiatric:  Negative    Heme/Lymph/Imm:  Positive for allergies  Endocrine:  Negative                Physical Exam:     Vitals: /80  Pulse 80  Ht 1.524 m (5')  Wt 77.6 kg (171 lb)  BMI 33.4 kg/m2  Constitutional:  cooperative, alert and oriented, well developed, well  nourished, in no acute distress        Skin:  warm and dry to the touch, no apparent skin lesions or masses noted        Head:  normocephalic, no masses or lesions        Eyes:  pupils equal and round        ENT:  no pallor or cyanosis, dentition good        Neck:  JVP normal        Chest:  normal breath sounds, clear to auscultation, normal A-P diameter, normal symmetry, normal respiratory excursion, no use of accessory muscles        Cardiac: regular rhythm;normal S1 and S2       grade 1;RUSB;early systolic murmur          Abdomen:      benign    Extremities and Back:  no deformities, clubbing, cyanosis, erythema observed        Neurological:  affect appropriate, oriented to time, person and place ataxia dysarthria waxes and wanes             Medications:     Current Outpatient Prescriptions   Medication Sig Dispense Refill     apixaban ANTICOAGULANT (ELIQUIS) 2.5 MG tablet Take 1 tablet (2.5 mg) by mouth 2 times daily 180 tablet 3     metoprolol (LOPRESSOR) 25 MG tablet Take 1 tablet (25 mg) by mouth 2 times daily 180 tablet 3     amantadine HCl 100 MG TABS One pill in am 270 tablet 2     gabapentin (NEURONTIN) 300 MG capsule Take 1 capsule (300 mg) by mouth At Bedtime 90 capsule 3     NIFEdipine ER osmotic (PROCARDIA XL) 60 MG TB24 TAKE 1 TABLET DAILY 90 tablet 1     trospium (SANCTURA XR) 60 MG CP24 Take 1 capsule (60 mg) by mouth every morning 90 each 3     alendronate (FOSAMAX) 70 MG tablet Take 1 tablet (70 mg) by mouth every 7 days Take with over 8 ounces water and stay upright for at least 30 minutes after dose.  Take at least 60 minutes before breakfast 12 tablet 3     hydrochlorothiazide 12.5 MG TABS Take 1 tablet (12.5 mg) by mouth daily 90 tablet 3     acetaminophen (TYLENOL) 325 MG tablet Take 2 tablets (650 mg) by mouth every 4 hours as needed for mild pain       [DISCONTINUED] metoprolol (LOPRESSOR) 25 MG tablet TAKE ONE-HALF (1/2) TABLET (12.5 MG) TWICE A DAY 90 tablet 2     clobetasol (TEMOVATE)  0.05 % cream Apply sparingly to affected area once daily as needed.  Do not apply to face. 60 g 1     Nystatin (NYSTOP) 678830 UNIT/GM POWD Apply to affected area 2x daily 15 g 1     ORDER FOR DME Equipment being ordered: Electric Wheelchair for home use. 1 Units 0                Data:   All laboratory data reviewed  No results found for this or any previous visit (from the past 24 hour(s)).    All laboratory data reviewed  Lab Results   Component Value Date    CHOL 132 11/01/2016     Lab Results   Component Value Date    HDL 32 11/01/2016     Lab Results   Component Value Date    LDL 65 11/01/2016     Lab Results   Component Value Date    TRIG 173 11/01/2016     Lab Results   Component Value Date    CHOLHDLRATIO 2.8 10/30/2015     TSH   Date Value Ref Range Status   04/09/2014 4.05 0.4 - 5.0 mU/L Final     Last Basic Metabolic Panel:  Lab Results   Component Value Date     11/01/2016      Lab Results   Component Value Date    POTASSIUM 3.9 11/01/2016     Lab Results   Component Value Date    CHLORIDE 107 11/01/2016     Lab Results   Component Value Date    OLI 9.1 11/01/2016     Lab Results   Component Value Date    CO2 30 11/01/2016     Lab Results   Component Value Date    BUN 32 11/01/2016     Lab Results   Component Value Date    CR 0.93 11/01/2016     Lab Results   Component Value Date     11/01/2016     Lab Results   Component Value Date    WBC 9.6 07/20/2016     Lab Results   Component Value Date    RBC 4.49 07/20/2016     Lab Results   Component Value Date    HGB 13.5 07/20/2016     Lab Results   Component Value Date    HCT 39.8 07/20/2016     Lab Results   Component Value Date    MCV 89 07/20/2016     Lab Results   Component Value Date    MCH 30.1 07/20/2016     Lab Results   Component Value Date    MCHC 33.9 07/20/2016     Lab Results   Component Value Date    RDW 14.1 07/20/2016     Lab Results   Component Value Date     07/20/2016

## 2017-09-29 NOTE — PATIENT INSTRUCTIONS
On your pacemaker check, we saw some atrial fibrillation which is a funny heart beat that can cause strokes. We would like you to start ELIQUIS which is a blood thinner to prevent strokes, but will use the lower dose. If you notice more nosebleeds or other bleeding, please stop it and let us know.    I'd like to increase your METOPROLOL to a full pill twice daily so that your heart doesn't go too fast when out of rhythm.    See you in 3 months to make sure you're doing okay.

## 2017-09-29 NOTE — PROGRESS NOTES
[employer] Advisa MRI (D) Pacemaker Device Check  AP: 90 % : 100 %  Mode: DDDR  bpm        Underlying Rhythm: SB with pauses and PAC's  Heart Rate: Heart rate stable with with adequate varaibility. Patient walks slowly with walker.  Sensing: WNL    Pacing Threshold: WNL   Impedance: WNL  Battery Status: Approximately 7.5 years longevity.  Atrial Arrhythmia: 17 mode switches comprising of <0.1% of the time, EGMS showed a PAF/PAFL with -130 bpm, longest lasting 2 hour 13 minute. Patient stopped ASA 81 mgs due to nose bleeds  Ventricular Arrhythmia: 1 episode logged, EGM showed 6 beats of NSVT rate 160-190 bpm  Setting Change: 0    Care Plan: Seeing Dr. Jiménez today. Follow up with Q 3 month remote checks. ESTEVAN Ta RN

## 2017-09-29 NOTE — MR AVS SNAPSHOT
After Visit Summary   9/29/2017    Kat Gomez    MRN: [id number]           Patient Information     Date Of Birth          12/1/1928        Visit Information        Provider Department      9/29/2017 11:15 AM Lucius Jiménez MD [facility] [place]        Today's Diagnoses     Paroxysmal atrial fibrillation (H)    -  1    Aortic dissection, thoracic (H)          Care Instructions    On your pacemaker check, we saw some atrial fibrillation which is a funny heart beat that can cause strokes. We would like you to start ELIQUIS which is a blood thinner to prevent strokes, but will use the lower dose. If you notice more nosebleeds or other bleeding, please stop it and let us know.    I'd like to increase your METOPROLOL to a full pill twice daily so that your heart doesn't go too fast when out of rhythm.    See you in 3 months to make sure you're doing okay.          Follow-ups after your visit        Additional Services     Follow-Up with Cardiologist                 Your next 10 appointments already scheduled     Nov 01, 2017  9:00 AM CDT   Office Visit with Kelli Del Toro MD   [facility] ([facility]    [address] [place] [address]  [address]  [facility] [id number] [phone]           Bring a current list of meds and any records pertaining to this visit. For Physicals, please bring immunization records and any forms needing to be filled out. Please arrive 10 minutes early to complete paperwork.            Jan 09, 2018 11:45 AM CST   Remote PPM Check with BEDOLLA TECH1   [facility] [place] ([facility])    [address] [place] [address] [address]  [facility] [id number] [phone]           This appointment is for a remote check of your pacemaker.  This is not an appointment at the office.              Future tests that were ordered for you today     Open Future Orders        Priority Expected  Expires Ordered    Follow-Up with Cardiologist Routine 12/28/2017 9/29/2018 9/29/2017            Who to contact     If you have questions or need follow up information about today's clinic visit or your schedule please contact [facility] [place] directly at [phone].  Normal or non-critical lab and imaging results will be communicated to you by MyChart, letter or phone within 4 business days after the clinic has received the results. If you do not hear from us within 7 days, please contact the clinic through [email or web address] or phone. If you have a critical or abnormal lab result, we will notify you by phone as soon as possible.  Submit refill requests through [facility] or call your pharmacy and they will forward the refill request to us. Please allow 3 business days for your refill to be completed.          Additional Information About Your Visit        MyChart Information     [facility] gives you secure access to your electronic health record. If you see a primary care provider, you can also send messages to your care team and make appointments. If you have questions, please call your primary care clinic.  If you do not have a primary care provider, please call [phone] and they will assist you.        Care EveryWhere ID     This is your Care EveryWhere ID. This could be used by other organizations to access your [facility] medical records  [id number]        Your Vitals Were     Pulse Height BMI (Body Mass Index)             80 1.524 m (5') 33.4 kg/m2          Blood Pressure from Last 3 Encounters:   09/29/17 124/80   09/13/17 104/68   06/04/17 122/70    Weight from Last 3 Encounters:   09/29/17 77.6 kg (171 lb)   09/13/17 79.4 kg (175 lb)   06/04/17 79.4 kg (175 lb)              We Performed the Following     Follow-Up with Cardiologist          Today's Medication Changes          These changes are accurate as of: 9/29/17 11:37 AM.  If you have any questions, ask your nurse or doctor.                Start taking these medicines.        Dose/Directions    apixaban ANTICOAGULANT 2.5 MG tablet   Commonly known as:  ELIQUIS   Used for:  Paroxysmal atrial fibrillation (H)   Started by:  Lucius Jiménez MD        Dose:  2.5 mg   Take 1 tablet (2.5 mg) by mouth 2 times daily   Quantity:  180 tablet   Refills:  3         Stop taking these medicines if you haven't already. Please contact your care team if you have questions.     aspirin 81 MG tablet   Stopped by:  Lucius Jiménez MD                Where to get your medicines      These medications were sent to [facility] - [address]  [address] [facility] [address]     Phone:  [phone]     apixaban ANTICOAGULANT 2.5 MG tablet                Primary Care Provider Office Phone # Fax #    Kelli Del Toro [phone] [address] [place] [address]        Equal Access to Services     [facility]: Hadii aad ku hadasho Soomaali, waaxda luqadaha, qaybta kaalmada adeegyada, waxay idiin hayaan adesiddharth kharash bradleyn . So [facility] [phone].    ATENCIÓN: Si habla español, tiene a watts disposición servicios gratuitos de asistencia lingüística. [facility] [phone].    We comply with applicable federal civil rights laws and [place] laws. We do not discriminate on the basis of race, color, national origin, age, disability sex, sexual orientation or gender identity.            Thank you!     Thank you for choosing [facility] [place]  for your care. Our goal is always to provide you with excellent care. Hearing back from our patients is one way we can continue to improve our services. Please take a few minutes to complete the written survey that you may receive in the mail after your visit with us. Thank you!             Your Updated Medication List - Protect others around you: Learn how to safely use, store and throw away  your medicines at www.disposemymeds.org.          This list is accurate as of: 9/29/17 11:37 AM.  Always use your most recent med list.                   Brand Name Dispense Instructions for use Diagnosis    acetaminophen 325 MG tablet    TYLENOL     Take 2 tablets (650 mg) by mouth every 4 hours as needed for mild pain    S/P cardiac pacemaker procedure       alendronate 70 MG tablet    FOSAMAX    12 tablet    Take 1 tablet (70 mg) by mouth every 7 days Take with over 8 ounces water and stay upright for at least 30 minutes after dose.  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  Do not apply to face.    Vaginal itching       gabapentin 300 MG capsule    NEURONTIN    90 capsule    Take 1 capsule (300 mg) by mouth At Bedtime    Peripheral neuropathy, idiopathic       hydrochlorothiazide 12.5 MG Tabs tablet     90 tablet    Take 1 tablet (12.5 mg) by mouth daily    Benign essential hypertension       metoprolol 25 MG tablet    LOPRESSOR    90 tablet    TAKE ONE-HALF (1/2) TABLET (12.5 MG) TWICE A DAY    Essential hypertension, benign       NIFEdipine ER osmotic 60 MG Tb24    PROCARDIA XL    90 tablet    TAKE 1 TABLET DAILY    Essential hypertension, benign       NYSTOP [id number] UNIT/GM Powd   Generic drug:  nystatin     15 g    Apply to affected area 2x daily    Yeast infection of the skin       order for DME     1 Units    Equipment being ordered: Electric Wheelchair for home use.    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

## 2017-09-29 NOTE — LETTER
9/29/2017    Kelli Del Toro MD  [address] [place] [address]    RE: Kat Gomez       Dear Colleague,    I had the pleasure of seeing Kat Gomez in the [facility].    Cardiology Progress Note          Assessment and Plan:     1. Paroxysmal atrial fibrillation and nonsustained ventricular tachycardia seen on device check    Trial of Eliquis 2.5 mg twice daily for stroke prophylaxis    If patient cannot tolerate it secondary to nosebleeds, may need to see ENT or discontinue.    Increase metoprolol from 12.5 mg twice daily up to 25mg twice daily to help with the tachycardia    Follow-up in three months to reassess      2. 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      3. Ascending aortic dissection and repair and resuspension of her native AV    Distal left subclavian point of origin and retrograded back into the ascending aorta.      This note was transcribed using electronic voice recognition software and there may be typographical errors present.                Interval History:   The patient is a very pleasant 88 year old whom I have been following for aortic dissection and resuspension of her native aortic valve, stable on CTA. She also had sick sinus syndrome requiring pacemaker implantation.  Her pacemaker checked today shows paroxysmal atrial fibrillation and paroxysmal nonsustained ventricular tachycardia, both with heart rates above 100 bpm.  She denies any unsteadiness or recent falls.  She stopped her aspirin secondary to a nosebleed.                       Review of Systems:   Review of Systems:  Skin:  Negative     Eyes:  Positive for glasses  ENT:  Positive for hearing loss  Respiratory:  Negative    Cardiovascular:  Negative    Gastroenterology: Negative    Genitourinary:  not assessed    Musculoskeletal:  Negative    Neurologic:  Negative    Psychiatric:  Negative    Heme/Lymph/Imm:   Positive for allergies  Endocrine:  Negative                Physical Exam:     Vitals: /80  Pulse 80  Ht 1.524 m (5')  Wt 77.6 kg (171 lb)  BMI 33.4 kg/m2  Constitutional:  cooperative, alert and oriented, well developed, well nourished, in no acute distress        Skin:  warm and dry to the touch, no apparent skin lesions or masses noted        Head:  normocephalic, no masses or lesions        Eyes:  pupils equal and round        ENT:  no pallor or cyanosis, dentition good        Neck:  JVP normal        Chest:  normal breath sounds, clear to auscultation, normal A-P diameter, normal symmetry, normal respiratory excursion, no use of accessory muscles        Cardiac: regular rhythm;normal S1 and S2       grade 1;RUSB;early systolic murmur          Abdomen:      benign    Extremities and Back:  no deformities, clubbing, cyanosis, erythema observed        Neurological:  affect appropriate, oriented to time, person and place ataxia dysarthria waxes and wanes             Medications:     Current Outpatient Prescriptions   Medication Sig Dispense Refill     apixaban ANTICOAGULANT (ELIQUIS) 2.5 MG tablet Take 1 tablet (2.5 mg) by mouth 2 times daily 180 tablet 3     metoprolol (LOPRESSOR) 25 MG tablet Take 1 tablet (25 mg) by mouth 2 times daily 180 tablet 3     amantadine HCl 100 MG TABS One pill in am 270 tablet 2     gabapentin (NEURONTIN) 300 MG capsule Take 1 capsule (300 mg) by mouth At Bedtime 90 capsule 3     NIFEdipine ER osmotic (PROCARDIA XL) 60 MG TB24 TAKE 1 TABLET DAILY 90 tablet 1     trospium (SANCTURA XR) 60 MG CP24 Take 1 capsule (60 mg) by mouth every morning 90 each 3     alendronate (FOSAMAX) 70 MG tablet Take 1 tablet (70 mg) by mouth every 7 days Take with over 8 ounces water and stay upright for at least 30 minutes after dose.  Take at least 60 minutes before breakfast 12 tablet 3     hydrochlorothiazide 12.5 MG TABS Take 1 tablet (12.5 mg) by mouth daily 90 tablet 3     acetaminophen  (TYLENOL) 325 MG tablet Take 2 tablets (650 mg) by mouth every 4 hours as needed for mild pain       [DISCONTINUED] metoprolol (LOPRESSOR) 25 MG tablet TAKE ONE-HALF (1/2) TABLET (12.5 MG) TWICE A DAY 90 tablet 2     clobetasol (TEMOVATE) 0.05 % cream Apply sparingly to affected area once daily as needed.  Do not apply to face. 60 g 1     Nystatin (NYSTOP) [id number] UNIT/GM POWD Apply to affected area 2x daily 15 g 1     ORDER FOR DME Equipment being ordered: Electric Wheelchair for home use. 1 Units 0                Data:   All laboratory data reviewed  No results found for this or any previous visit (from the past 24 hour(s)).    All laboratory data reviewed  Lab Results   Component Value Date    CHOL 132 11/01/2016     Lab Results   Component Value Date    HDL 32 11/01/2016     Lab Results   Component Value Date    LDL 65 11/01/2016     Lab Results   Component Value Date    TRIG 173 11/01/2016     Lab Results   Component Value Date    CHOLHDLRATIO 2.8 10/30/2015     TSH   Date Value Ref Range Status   04/09/2014 4.05 0.4 - 5.0 mU/L Final     Last Basic Metabolic Panel:  Lab Results   Component Value Date     11/01/2016      Lab Results   Component Value Date    POTASSIUM 3.9 11/01/2016     Lab Results   Component Value Date    CHLORIDE 107 11/01/2016     Lab Results   Component Value Date    OLI 9.1 11/01/2016     Lab Results   Component Value Date    CO2 30 11/01/2016     Lab Results   Component Value Date    BUN 32 11/01/2016     Lab Results   Component Value Date    CR 0.93 11/01/2016     Lab Results   Component Value Date     11/01/2016     Lab Results   Component Value Date    WBC 9.6 07/20/2016     Lab Results   Component Value Date    RBC 4.49 07/20/2016     Lab Results   Component Value Date    HGB 13.5 07/20/2016     Lab Results   Component Value Date    HCT 39.8 07/20/2016     Lab Results   Component Value Date    MCV 89 07/20/2016     Lab Results   Component Value Date    MCH 30.1 07/20/2016      Lab Results   Component Value Date    MCHC 33.9 07/20/2016     Lab Results   Component Value Date    RDW 14.1 07/20/2016     Lab Results   Component Value Date     07/20/2016     Thank you for allowing me to participate in the care of your patient.    Sincerely,     Lucius Jiménez MD     [facility]

## 2017-10-06 NOTE — TELEPHONE ENCOUNTER
Fosamax 70mg    Last Written Prescription Date: 11/1/16  Last Fill Quantity: 12, # refills: 3  Last Office Visit with [facility] or [facility] prescribing provider: 5/3/17  Next 5 appointments (look out 90 days)     Nov 01, 2017  9:00 AM CDT   Office Visit with Kelli Del Toro MD   [facility]    [address]  [address]  [facility] [address] [phone]            Dec 27, 2017 10:15 AM CST   Return Visit with Lucius Jiménez MD   [facility] [place] ([facility])    [address]  [facility] [address] [phone]                   DEXA Scan:  Last order of DX HIP/PELVIS/SPINE was found on 7/22/2015 from [facility] Appointment on 7/22/2015     No order of DX HIP/PELVIS/SPINE W LAT FRACTION ANALYSIS is found.       Creatinine   Date Value Ref Range Status   11/01/2016 0.93 0.52 - 1.04 mg/dL Final

## 2017-10-06 NOTE — TELEPHONE ENCOUNTER
Medication is being filled for 1 time refill only due to:  Patient needs to be seen because due for upcoming annual appointment.     Given 12 tablets - can get further refills at upcoming visit.

## 2017-10-16 NOTE — TELEPHONE ENCOUNTER
Prescription approved per [facility] Ref[facility] Protocol x1 90 day supply. Patient has upcoming appointment 11/1/17.

## 2017-10-16 NOTE — TELEPHONE ENCOUNTER
simvastatin (ZOCOR) 40 MG tablet     Last Written Prescription Date: 1/18/2017  Last Fill Quantity: 90, # refills: 2  Last Office Visit with [facility], [facility] or [facility] prescribing provider: 5/3/2017  Next 5 appointments (look out 90 days)     Nov 01, 2017  9:00 AM CDT   Office Visit with Kelli Del Toro MD   [facility]    [address]  [address]  [place] [address]   [phone]            Dec 27, 2017 10:15 AM CST   Return Visit with Lucius Jiménez MD   [facility] ([facility])    [address]  [facility] [id number] [phone]                   Lab Results   Component Value Date    CHOL 132 11/01/2016     Lab Results   Component Value Date    HDL 32 11/01/2016     Lab Results   Component Value Date    LDL 65 11/01/2016     Lab Results   Component Value Date    TRIG 173 11/01/2016     Lab Results   Component Value Date    CHOLHDLRATIO 2.8 10/30/2015

## 2017-11-01 PROBLEM — I48.20 CHRONIC ATRIAL FIBRILLATION (H): Status: ACTIVE | Noted: 2017-01-01

## 2017-11-01 NOTE — MR AVS SNAPSHOT
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)        Chronic atrial fibrillation (H)        CKD (chronic kidney disease) stage 3, GFR 30-59 ml/min        Coronary artery disease involving native coronary artery of native heart without angina pectoris        Benign essential hypertension        Hyperlipidemia LDL goal <100        Abdominal aortic aneurysm (AAA) without rupture (H)        Age-related osteoporosis without current pathological fracture        Prediabetes        Obesity (BMI 30-39.9)        Urinary, incontinence, stress female        Need for prophylactic vaccination and inoculation against influenza        Current use of long term anticoagulation          Care Instructions    Call and schedule for fasting labs in the next 2-3 weeks.     Follow up in 6 months on April 4th at 9:00 AM.       Preventive Health Recommendations    Female Ages 65 +    Yearly exam:     See your health care provider every year in order to  o Review health changes.   o Discuss preventive care.    o Review your medicines if your doctor has prescribed any.      You no longer need a yearly Pap test unless you've had an abnormal Pap test in the past 10 years. If you have vaginal symptoms, such as bleeding or discharge, be sure to talk with your provider about a Pap test.      Every 1 to 2 years, have a mammogram.  If you are over 69, talk with your health care provider about whether or not you want to continue having screening mammograms.      Every 10 years, have a colonoscopy. Or, have a yearly FIT test (stool test). These exams will check for colon cancer.        Have a cholesterol test every 5 years, or more often if your doctor advises it.       Have a diabetes test (fasting glucose) every three years. If you are at risk for diabetes, you should have this test more often.       At age 65, have a bone density scan (DEXA) to check for osteoporosis (brittle bone disease).    Shots:    Get a flu shot each year.    Get a tetanus shot every 10 years.    Talk to your doctor about your pneumonia vaccines. There are now two you should receive - Pneumovax (PPSV 23) and Prevnar (PCV 13).    Talk to your doctor about the shingles vaccine.    Talk to your doctor about the hepatitis B vaccine.    Nutrition:     Eat at least 5 servings of fruits and vegetables each day.      Eat whole-grain bread, whole-wheat pasta and brown rice instead of white grains and rice.      Talk to your provider about Calcium and Vitamin D.     Lifestyle    Exercise at least 150 minutes a week (30 minutes a day, 5 days a week). This will help you control your weight and prevent disease.      Limit alcohol to one drink per day.      No smoking.       Wear sunscreen to prevent skin cancer.       See your dentist twice a year for an exam and cleaning.      See your eye doctor every 1 to 2 years to screen for conditions such as glaucoma, macular degeneration and cataracts.          Follow-ups after your visit        Your next 10 appointments already scheduled     Dec 27, 2017 10:15 AM CST   Return Visit with Lucius Jiménez MD   [facility] ([facility])    [address]  [place] [id number] [phone]            Jan 09, 2018 11:45 AM CST   Remote PPM Check with BEDOLLA TECH1   [facility] ([facility])    [address] [place] [address] [address]  [facility] [id number] [phone]           This appointment is for a remote check of your pacemaker.  This is not an appointment at the office.             Apr 04, 2018  9:00 AM CDT   Office Visit with Kleli Del Toro MD   [facility] [place] ([facility])    [address]  [address]  [place] [address] [phone]           Bring a current list of meds and any records pertaining to this visit. For Physicals, please bring immunization records and any forms needing to be filled out. Please arrive 10 minutes early to complete paperwork.              Future tests that were ordered for you today     Open Future Orders        Priority Expected Expires Ordered    CBC with platelets Routine  2/1/2018 11/1/2017    COMPREHENSIVE METABOLIC PANEL Routine  2/1/2018 11/1/2017    HEMOGLOBIN A1C Routine  2/1/2018 11/1/2017    Lipid panel reflex to direct LDL Fasting Routine  2/1/2018 11/1/2017    Albumin Random Urine Quantitative with Creat Ratio Routine  2/1/2018 11/1/2017            Who to contact     If you have questions or need follow up information about today's clinic visit or your schedule please contact [facility] directly at [phone].  Normal or non-critical lab and imaging results will be communicated to you by [employer], letter or phone within 4 business days after the clinic has received the results. If you do not hear from us within 7 days, please contact the clinic through [employer] or phone. If you have a critical or abnormal lab result, we will notify you by phone as soon as possible.  Submit refill requests through [facility] or call your pharmacy and they will forward the refill request to us. Please allow 3 business days for your refill to be completed.          Additional Information About Your Visit        [facility] Information     [facility] gives you secure access to your electronic health record. If you see a primary care provider, you can also send messages to your care team and make appointments. If you have questions, please call your primary care clinic.  If you do not have a primary care provider, please  call [phone] and they will assist you.        Care EveryWhere ID     This is your Care EveryWhere ID. This could be used by other organizations to access your [facility] medical records  [id number]        Your Vitals Were     Pulse Temperature Height Pulse Oximetry BMI (Body Mass Index)       78 97.8  F (36.6  C) (Tympanic) 5' (1.524 m) 95% 33.38 kg/m2        Blood Pressure from Last 3 Encounters:   11/01/17 112/68   09/29/17 124/80   09/13/17 104/68    Weight from Last 3 Encounters:   11/01/17 170 lb 14.4 oz (77.5 kg)   09/29/17 171 lb (77.6 kg)   09/13/17 175 lb (79.4 kg)              We Performed the Following     ADMIN INFLUENZA (For MEDICARE Patients ONLY) []     FLU VACCINE, INCREASED ANTIGEN, PRESV FREE, AGE 65+ [07905]          Today's Medication Changes          These changes are accurate as of: 11/1/17  9:33 AM.  If you have any questions, ask your nurse or doctor.               These medicines have changed or have updated prescriptions.        Dose/Directions    hydrochlorothiazide 12.5 MG Tabs tablet   This may have changed:  See the new instructions.   Used for:  Benign essential hypertension   Changed by:  Kelli Del Toro MD        Dose:  12.5 mg   Take 1 tablet (12.5 mg) by mouth daily   Quantity:  90 tablet   Refills:  1         Stop taking these medicines if you haven't already. Please contact your care team if you have questions.     alendronate 70 MG tablet   Commonly known as:  FOSAMAX   Stopped by:  Kelli Del Toro MD                Where to get your medicines      These medications were sent to [facility] [place] - [address]  [address] [address]     Phone:  [phone]     hydrochlorothiazide 12.5 MG Tabs tablet    trospium 60 MG Cp24 24 hr capsule                Primary Care Provider Office Phone # Fax #    Kelli Del Toro [phone] [address] [place]  [place] [address]        Equal Access to Services     [facility] : Hadii claudia ku joyce Mercedes, wastevenda luqadaha, qaybta kaalmada davidskipnavdeep, waxterrence atul causeyarvindnohemi montanez. So [facility] [phone].    ATENCIÓN: Si habla español, tiene a watts disposición servicios gratuitos de asistencia lingüística. Llame al [phone].    We comply with applicable federal civil rights laws and [place] laws. We do not discriminate on the basis of race, color, national origin, age, disability, sex, sexual orientation, or gender identity.            Thank you!     Thank you for choosing [facility]  for your care. Our goal is always to provide you with excellent care. Hearing back from our patients is one way we can continue to improve our services. Please take a few minutes to complete the written survey that you may receive in the mail after your visit with us. Thank you!             Your Updated Medication List - Protect others around you: Learn how to safely use, store and throw away your medicines at www.disposemymeds.org.          This list is accurate as of: 11/1/17  9:33 AM.  Always use your most recent med list.                   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  Do not apply to  face.    Vaginal itching       gabapentin 300 MG capsule    NEURONTIN    90 capsule    Take 1 capsule (300 mg) by mouth At Bedtime    Peripheral neuropathy, idiopathic       hydrochlorothiazide 12.5 MG Tabs tablet     90 tablet    Take 1 tablet (12.5 mg) by mouth daily    Benign essential hypertension       metoprolol 25 MG tablet    LOPRESSOR    180 tablet    Take 1 tablet (25 mg) by mouth 2 times daily    Essential hypertension, benign       NIFEdipine ER osmotic 60 MG Tb24    PROCARDIA XL    90 tablet    TAKE 1 TABLET DAILY    Essential hypertension, benign       NYSTOP [id number] UNIT/GM Powd   Generic drug:  nystatin     15 g    Apply to affected area 2x daily    Yeast infection of the skin       order for DME     1 Units    Equipment being ordered: Electric Wheelchair for home use.    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

## 2017-11-01 NOTE — PATIENT INSTRUCTIONS
Call and schedule for fasting labs in the next 2-3 weeks.     Follow up in 6 months on April 4th at 9:00 AM.       Preventive Health Recommendations    Female Ages 65 +    Yearly exam:     See your health care provider every year in order to  o Review health changes.   o Discuss preventive care.    o Review your medicines if your doctor has prescribed any.      You no longer need a yearly Pap test unless you've had an abnormal Pap test in the past 10 years. If you have vaginal symptoms, such as bleeding or discharge, be sure to talk with your provider about a Pap test.      Every 1 to 2 years, have a mammogram.  If you are over 69, talk with your health care provider about whether or not you want to continue having screening mammograms.      Every 10 years, have a colonoscopy. Or, have a yearly FIT test (stool test). These exams will check for colon cancer.       Have a cholesterol test every 5 years, or more often if your doctor advises it.       Have a diabetes test (fasting glucose) every three years. If you are at risk for diabetes, you should have this test more often.       At age 65, have a bone density scan (DEXA) to check for osteoporosis (brittle bone disease).    Shots:    Get a flu shot each year.    Get a tetanus shot every 10 years.    Talk to your doctor about your pneumonia vaccines. There are now two you should receive - Pneumovax (PPSV 23) and Prevnar (PCV 13).    Talk to your doctor about the shingles vaccine.    Talk to your doctor about the hepatitis B vaccine.    Nutrition:     Eat at least 5 servings of fruits and vegetables each day.      Eat whole-grain bread, whole-wheat pasta and brown rice instead of white grains and rice.      Talk to your provider about Calcium and Vitamin D.     Lifestyle    Exercise at least 150 minutes a week (30 minutes a day, 5 days a week). This will help you control your weight and prevent disease.      Limit alcohol to one drink per day.      No smoking.        Wear sunscreen to prevent skin cancer.       See your dentist twice a year for an exam and cleaning.      See your eye doctor every 1 to 2 years to screen for conditions such as glaucoma, macular degeneration and cataracts.

## 2017-11-01 NOTE — NURSING NOTE
Chief Complaint   Patient presents with     Physical       Initial /68 (BP Location: Right arm, Patient Position: Sitting, Cuff Size: Adult Regular)  Pulse 78  Temp 97.8  F (36.6  C) (Tympanic)  Ht 5' (1.524 m)  Wt 170 lb 14.4 oz (77.5 kg)  SpO2 95%  BMI 33.38 kg/m2 Estimated body mass index is 33.38 kg/(m^2) as calculated from the following:    Height as of this encounter: 5' (1.524 m).    Weight as of this encounter: 170 lb 14.4 oz (77.5 kg).  Medication Reconciliation: beverly Vines

## 2017-11-01 NOTE — PROGRESS NOTES
"  SUBJECTIVE:   Kat Gomez is a 88 year old female who presents for Preventive Visit.    Kat is a patient with a complex PMHx who presents to the clinic for her annual physical. Since her last visit patient reports she is not taking Aspirin since she reports episodes of epistaxis. Denies hematochezia and bleeding from the gums.     States increase balance issues which is her main issue at the moment. Reports last time she saw her neurologist was a few years ago (2014). States she has been careful and reports 1 fall in June. patient is living with her son and has a medical distress button.      Notes she saw her vascular surgeon who recommended 1 year follow up for aneurysms.  Saw  cardiologist and was put in Eliquis for paroxysmal afib. Denies epistaxis     Reports she stopped taking Fosamax 2 months ago. Had been on fosamax since 10/2012.      Bp in clinic was 112/68; denies presyncopal episodes, chest pain and shortness of breath. State she is watching her diet but has not increased exercise. Weight in clinic was 170 lbs.     Notes her bladder is  90% better\", taking sanctura regularly.        Are you in the first 12 months of your Medicare Part B coverage?  No    Healthy Habits:    Do you get at least three servings of calcium containing foods daily (dairy, green leafy vegetables, etc.)? yes    Amount of exercise or daily activities, outside of work: Some     Problems taking medications regularly No    Medication side effects: No    Have you had an eye exam in the past two years? Scheduled     Do you see a dentist twice per year? no    Do you have sleep apnea, excessive snoring or daytime drowsiness?no    COGNITIVE SCREEN  1) Repeat 3 items (Banana, Sunrise, Chair)    2) Clock draw: NORMAL  3) 3 item recall: Recalls 2 objects   Results: NORMAL clock, 1-2 items recalled: COGNITIVE IMPAIRMENT LESS LIKELY    Mini-CogTM Copyright SLICK Smith. Licensed by the author for use in [facility]; " reprinted with permission (karen@.[email or web address]). All rights reserved.          Reviewed and updated as needed this visit by clinical staffTobacco  Allergies  Meds  Med Hx  Surg Hx  Fam Hx  Soc Hx        Reviewed and updated as needed this visit by Provider        Social History   Substance Use Topics     Smoking status: Never Smoker     Smokeless tobacco: Never Used     Alcohol use No      Comment: glass of wine once a week       The patient does not drink >3 drinks per day nor >7 drinks per week.    Today's PHQ-2 Score:   PHQ-2 ( 1999 Pfizer) 5/3/2017 11/1/2016   Q1: Little interest or pleasure in doing things 0 0   Q2: Feeling down, depressed or hopeless 0 0   PHQ-2 Score 0 0         Do you feel safe in your environment - Yes    Do you have a Health Care Directive?: Yes: Advance Directive has been received and scanned.    Current providers sharing in care for this patient include:   Patient Care Team:  Kelli Del Toro MD as PCP - General (Pediatrics)  Kelli Del Toro MD (Pediatrics)      Hearing impairment: Yes, hearing aids     Ability to successfully perform activities of daily living: Yes, no assistance needed     Fall risk:  Fallen 2 or more times in the past year?: No  Any fall with injury in the past year?: No      Home safety:  none identified    The following health maintenance items are reviewed in Epic and correct as of today:  Health Maintenance   Topic Date Due     INFLUENZA VACCINE (SYSTEM ASSIGNED)  09/01/2017     CMP Q1 YR  11/01/2017     LIPID MONITORING Q1 YEAR  11/01/2017     MICROALBUMIN Q1 YEAR  11/01/2017     A1C Q1 YR  11/01/2017     FALL RISK ASSESSMENT  05/03/2018     DEXA Q3 YR  07/22/2018     ADVANCE DIRECTIVE PLANNING Q5 YRS  12/07/2020     TETANUS IMMUNIZATION (SYSTEM ASSIGNED)  09/12/2022     PNEUMOCOCCAL  Completed     BP Readings from Last 3 Encounters:   11/01/17 112/68   09/29/17 124/80   09/13/17 104/68    Wt Readings from Last 3 Encounters:   11/01/17 77.5 kg  (170 lb 14.4 oz)   09/29/17 77.6 kg (171 lb)   09/13/17 79.4 kg (175 lb)                  Patient Active Problem List   Diagnosis     Advance Care Planning     Hyperlipidemia LDL goal <100     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)     Personal history of traumatic fracture     HL (hearing loss)     Urinary, incontinence, stress female     Health Care Home     Aortic dissection, thoracic (H)     Aortic aneurysm (H)     Osteoporosis     CKD (chronic kidney disease) stage 3, GFR 30-59 ml/min     Obesity (BMI 30-39.9)     Prediabetes     Gout     Vitamin D deficiency disease     LISSET (obstructive sleep apnea)     Peripheral neuropathy, idiopathic     Coronary artery disease involving native coronary artery without angina pectoris     Cardiac pacemaker in situ     Past Surgical History:   Procedure Laterality Date     CATARACT IOL, RT/LT       CORONARY ANGIOGRAPHY ADULT ORDER      2003     CORONARY ARTERY BYPASS      SVG to LAD and SVG to RCA     CYSTOCELE REPAIR       ENDOVASCULAR REPAIR, INFRARENAL ABDOMINAL AORTIC ANEURYSM/DISSECTION; MODULAR BIFURCATED PROSTHESIS      Emergent repair of aorta and resuspension of aortic valve with two vessel bypass in 2003     FRACTURE TX, HIP RT/LT      Fracture TX Hip RT/LT     IMPLANT PACEMAKER  7/2016    dual chamber pacemaker via right subclavian vein without complication     REPLACE VALVE AORTIC      Bioprosthetic valve in 2003       Social History   Substance Use Topics     Smoking status: Never Smoker     Smokeless tobacco: Never Used     Alcohol use No      Comment: glass of wine once a week     Family History   Problem Relation Age of Onset     CEREBROVASCULAR DISEASE Mother      Coronary Artery Disease Mother      Hyperlipidemia Mother      Prostate Cancer Father      Hyperlipidemia Father      DIABETES Sister      Neurologic Disorder Sister      cerebellar ataxia     GASTROINTESTINAL DISEASE Sister      autoimmune hepatitis     Coronary Artery Disease Son      Breast  Cancer Son      Colon Cancer Son      DIABETES Daughter      Breast Cancer Daughter          Current Outpatient Prescriptions   Medication Sig Dispense Refill     hydrochlorothiazide 12.5 MG TABS tablet TAKE 1 TABLET DAILY 90 tablet 1     NIFEdipine ER osmotic (PROCARDIA XL) 60 MG TB24 TAKE 1 TABLET DAILY 90 tablet 1     simvastatin (ZOCOR) 40 MG tablet TAKE 1 TABLET AT BEDTIME 90 tablet 0     alendronate (FOSAMAX) 70 MG tablet TAKE AS INSTRUCTED BY YOUR PRESCRIBER 12 tablet 0     apixaban ANTICOAGULANT (ELIQUIS) 2.5 MG tablet Take 1 tablet (2.5 mg) by mouth 2 times daily 180 tablet 3     metoprolol (LOPRESSOR) 25 MG tablet Take 1 tablet (25 mg) by mouth 2 times daily 180 tablet 3     amantadine HCl 100 MG TABS One pill in am 270 tablet 2     gabapentin (NEURONTIN) 300 MG capsule Take 1 capsule (300 mg) by mouth At Bedtime 90 capsule 3     trospium (SANCTURA XR) 60 MG CP24 Take 1 capsule (60 mg) by mouth every morning 90 each 3     clobetasol (TEMOVATE) 0.05 % cream Apply sparingly to affected area once daily as needed.  Do not apply to face. 60 g 1     Nystatin (NYSTOP) 854154 UNIT/GM POWD Apply to affected area 2x daily 15 g 1     acetaminophen (TYLENOL) 325 MG tablet Take 2 tablets (650 mg) by mouth every 4 hours as needed for mild pain       ORDER FOR DME Equipment being ordered: Electric Wheelchair for home use. 1 Units 0     ASPIRIN NOT PRESCRIBED (INTENTIONAL) Please choose reason not prescribed, below 0 each 0     No Known Allergies        Pneumonia Vaccine:utd   Mammogram Screening: Patient over age 75, has elected to stop mammography screening.    ROS:  Constitutional, HEENT, cardiovascular, pulmonary, GI, , musculoskeletal, neuro, skin, endocrine and psych systems are negative, except as otherwise noted.      NEURO POSITIVE for balance problems     This document serves as a record of the services and decisions personally performed and made by Kelli Del Toro MD. It was created on her behalf by Kayli  Will, a trained medical scribe. The creation of this document is based the provider's statements to the medical scribe.    Kayli Will November 1, 2017 9:15 AM  OBJECTIVE:   /68 (BP Location: Right arm, Patient Position: Sitting, Cuff Size: Adult Regular)  Pulse 78  Temp 97.8  F (36.6  C) (Tympanic)  Ht 5' (1.524 m)  Wt 170 lb 14.4 oz (77.5 kg)  SpO2 95%  BMI 33.38 kg/m2 Estimated body mass index is 33.38 kg/(m^2) as calculated from the following:    Height as of this encounter: 5' (1.524 m).    Weight as of this encounter: 170 lb 14.4 oz (77.5 kg).  EXAM:   GENERAL APPEARANCE: elderly, alert.  Difficulty speaking due to dysarthria from ataxia.   Eyes: Perrl, eomi  HENT: ear canals and TM's normal, nose and mouth without ulcers or lesions, oropharynx clear and oral mucous membranes moist  RESP: lungs clear to auscultation - no rales, rhonchi or wheezes  CV: regular rate and rhythm, normal S1 S2, no S3 or S4, no murmur, click or rub, no peripheral edema   ABDOMEN: soft, nontender, no hepatosplenomegaly, no masses and bowel sounds normal  PSYCH: mentation appears normal and affect normal/bright    ASSESSMENT / PLAN:   (Z00.00) Routine general medical examination at a health care facility  (primary encounter diagnosis)  -- immunizations UTD; flu today   --dexa UTD   -- Fasting labs scheduled (2-3 weeks)       (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)  -pt with progressive ataxia  -encouraged f/u with neurology  -pt feels safe in her environment, highest risk is for falls    (I48.2) Chronic atrial fibrillation (H)  -- rate controlled on metoprolol  -anticoagulated with eliquis- I am somewhat worried about fall risk and potential risk for head bleed with the ataxia    (N18.3) CKD (chronic kidney disease) stage 3, GFR 30-59 ml/min  -- due for labs, not on ace due to worsening kidney function     (I25.10) Coronary artery disease involving native coronary artery of native heart without angina  pectoris  -- patient not taking Asprin due to eliquis  -on b blocker, statin   -continue f/u with cardiology   Plan: ASPIRIN NOT PRESCRIBED (INTENTIONAL)          (I10) Benign essential hypertension  -- BP in clinic 112/68  -- on hctz, nifedipine and metoprolol  -- blood work fasting  Plan: COMPREHENSIVE METABOLIC PANEL, Albumin Random         Urine Quantitative with Creat Ratio,         hydrochlorothiazide 12.5 MG TABS tablet         (E78.5) Hyperlipidemia LDL goal <100  -- due for fasting labs  -continue statin   Plan: Lipid panel reflex to direct LDL Fasting           (I71.4) Abdominal aortic aneurysm (AAA) without rupture (H)  -- reviewed note from vascular surgeon  -repeat exam in 1year    (M81.0) Age-related osteoporosis without current pathological fracture  -- stop fosamax for drug holiday after 5 years of bisphosphonates  -recheck bone density in 1 year    (R73.03) Prediabetes  Plan: HEMOGLOBIN A1C        (E66.9) Obesity (BMI 30-39.9)  -- discussed healthy diet and exercise       (N39.3) Urinary, incontinence, stress female  -- reports improvement   -- refill medication   Plan: trospium (SANCTURA XR) 60 MG CP24 24 hr capsule            (Z23) Need for prophylactic vaccination and inoculation against influenza  Plan: FLU VACCINE, INCREASED ANTIGEN, PRESV FREE, AGE        65+ [63908], ADMIN INFLUENZA (For MEDICARE         Patients ONLY) []       (Z79.01) Current use of long term anticoagulation  Plan: CBC with platelets             Follow up on April 4th or as needed     End of Life Planning:  Patient currently has an advanced directive: Yes.  Practitioner is supportive of decision.    COUNSELING:  Reviewed preventive health counseling, as reflected in patient instructions       Regular exercise       Healthy diet/nutrition       Immunizations    Vaccinated for: Influenza           Osteoporosis Prevention/Bone Health          Estimated body mass index is 33.38 kg/(m^2) as calculated from the following:     Height as of this encounter: 5' (1.524 m).    Weight as of this encounter: 170 lb 14.4 oz (77.5 kg).  Weight management plan: Discussed healthy diet and exercise guidelines and patient will follow up in 12 months in clinic to re-evaluate.   reports that she has never smoked. She has never used smokeless tobacco.        Appropriate preventive services were discussed with this patient, including applicable screening as appropriate for cardiovascular disease, diabetes, osteopenia/osteoporosis, and glaucoma.  As appropriate for age/gender, discussed screening for colorectal cancer, prostate cancer, breast cancer, and cervical cancer. Checklist reviewing preventive services available has been given to the patient.    Reviewed patients plan of care and provided an AVS. The Basic Care Plan (routine screening as documented in Health Maintenance) for Kat meets the Care Plan requirement. This Care Plan has been established and reviewed with the Patient.    Counseling Resources:  ATP IV Guidelines  Pooled Cohorts Equation Calculator  Breast Cancer Risk Calculator  FRAX Risk Assessment  ICSI Preventive Guidelines  Dietary Guidelines for Americans, 2010  USDA's MyPlate  ASA Prophylaxis  Lung CA Screening    The information in this document, created by the medical scribe for me, accurately reflects the services I personally performed and the decisions made by me. I have reviewed and approved this document for accuracy prior to leaving the patient care area.  Kelli Del Toro MD  [facility]

## 2017-11-01 NOTE — PROGRESS NOTES

## 2017-11-06 NOTE — NURSING NOTE
Chief Complaint   Patient presents with     Urgent Care     Trauma     Right ankle injury. Patient fell getting into the car.      Initial /70 (BP Location: Right arm, Patient Position: Chair, Cuff Size: Adult Regular)  Pulse 74  Temp 97.5  F (36.4  C) (Oral)  Resp 20  SpO2 96% Estimated body mass index is 33.38 kg/(m^2) as calculated from the following:    Height as of 11/1/17: 5' (1.524 m).    Weight as of 11/1/17: 170 lb 14.4 oz (77.5 kg)..  BP completed using cuff size: regular  S ABEL, CMA

## 2017-11-06 NOTE — MR AVS SNAPSHOT
After Visit Summary   11/6/2017    Kat Gomez    MRN: [id number]           Patient Information     Date Of Birth          12/1/1928        Visit Information        Provider Department      11/6/2017 4:55 PM Jacinto Alejandro MD [facility]        Today's Diagnoses     Closed displaced fracture of medial malleolus of right tibia, initial encounter    -  1    Pain of right lower extremity          Care Instructions      Wear Boot.  Use walker when walking  See Podiatry here Wed Morning.      Understanding an Ankle Fracture    The ankle is formed by bones in the lower leg (tibia and fibula) and the bone on top of the foot (talus). When you have a fracture of the ankle, it means that one or more of the bones in the ankle are broken. The bone may be cracked, broken into two or more pieces, or even shattered. The pieces of bone may be lined up or they may have moved out of place. Sometimes, the bone may break through the skin. Nearby ligaments may also be damaged. Depending on how badly the bone is broken, healing may take a few months or longer.   What causes an ankle fracture?  Ankle fractures are often caused from severely twisting or rolling the ankle. They may also be caused from a fall, blow, accident, or sports injury.  Symptoms of an ankle fracture  Symptoms can include pain, swelling, and bruising. If the bone breaks through the skin, bleeding at the site can also occur. The ankle may look crooked, deformed, or bent. Also, it may be hard to move or use the ankle and foot as you would normally.  Treating an ankle fracture  Treatment for an ankle fracture depends on where the bone is broken and how serious the break is. If needed, the bone is put back into place. This may be done with or without surgery. If surgery is needed, the surgeon may use devices such as pins, plates, or screws to hold the bone together. Usually, you will wear a splint, brace, or short leg cast to  keep the bone in place and protect it from injury during healing. Other treatments may also be used to help reduce symptoms or regain function. These include:    Rest. You may need to avoid walking or putting any weight on the broken ankle for a period of time. Severe fractures need a longer limit on weight-bearing activities.    Cold packs. Putting an ice pack over the injured area may help reduce swelling and pain.    Compression. An elastic bandage may be wrapped around the ankle to help reduce swelling.    Elevation. Propping up the ankle so that it is above your heart may ease swelling.    Pain medicines. Prescription or over-the-counter pain medicines may help reduce pain and swelling. If needed, stronger pain medicines may be prescribed.    Exercises. Your healthcare provider may give you certain exercises to do at home or with a physical therapist. These help restore strength, flexibility, and range of motion in the ankle and foot.  Possible complications of an ankle fracture  These can include:    Poor healing of the bone    Weakness, stiffness, or loss of range of motion in the ankle    Osteoarthritis in the ankle  When to call your healthcare provider  Call your healthcare provider right away if you have any of these:    Fever of 100.4 F (38 C) or higher, or as directed    Symptoms that don t get better with treatment, or get worse    Numbness, tingling, or coldness in your foot or toes    Toenails that turn blue or grey in color    A splint, brace, or cast that is damaged or feels too tight or loose    Unusual redness, warmth, swelling, bleeding, or drainage from any wounds or incision sites    New symptoms   Date Last Reviewed: 3/10/2016    5868-8141 [employer]. [address] [place] [address], [place] [address]. All rights reserved. This information is not intended as a substitute for professional medical care. Always follow your healthcare professional's instructions.                Follow-ups  after your visit        Additional Services     PODIATRY/FOOT & ANKLE SURGERY REFERRAL       Your provider has referred you to: [facility] [phone]   [email or web address]    Please be aware that coverage of these services is subject to the terms and limitations of your health insurance plan.  Call member services at your health plan with any benefit or coverage questions.      Please bring the following to your appointment:  >>   Any x-rays, CTs or MRIs which have been performed.  Contact the facility where they were done to arrange for  prior to your scheduled appointment.    >>   List of current medications   >>   This referral request   >>   Any documents/labs given to you for this referral                  Your next 10 appointments already scheduled     Nov 13, 2017 11:00 AM CST   LAB with EA LAB   [facility]    [address]  [address]  [facility] [address] [phone]           Please do not eat 10-12 hours before your appointment if you are coming in fasting for labs on lipids, cholesterol, or glucose (sugar). This does not apply to pregnant women. Water, hot tea and black coffee (with nothing added) are okay. Do not drink other fluids, diet soda or chew gum.            Dec 27, 2017 10:15 AM CST   Return Visit with Lucius Jiménez MD   [facility] ([facility])    [address]  [place] [id number] [phone]            Jan 09, 2018 11:45 AM CST   Remote PPM Check with BEDOLLA TECH1   [facility] ([facility])    [address]  [place] [facility] [id number] [phone]           This appointment is for a remote check of your pacemaker.  This is not an appointment at the office.            Apr 04, 2018  9:00 AM CDT   Office Visit with Kelli Del Toro MD   [place]  [facility] ([facility])    [address]  [address]  [place] [place] [address] [phone]           Bring a current list of meds and any records pertaining to this visit. For Physicals, please bring immunization records and any forms needing to be filled out. Please arrive 10 minutes early to complete paperwork.              Who to contact     If you have questions or need follow up information about today's clinic visit or your schedule please contact [facility] directly at [phone].  Normal or non-critical lab and imaging results will be communicated to you by [employer], letter or phone within 4 business days after the clinic has received the results. If you do not hear from us within 7 days, please contact the clinic through [employer] or phone. If you have a critical or abnormal lab result, we will notify you by phone as soon as possible.  Submit refill requests through [employer] or call your pharmacy and they will forward the refill request to us. Please allow 3 business days for your refill to be completed.          Additional Information About Your Visit        [employer] Information     [employer] gives you secure access to your electronic health record. If you see a primary care provider, you can also send messages to your care team and make appointments. If you have questions, please call your primary care clinic.  If you do not have a primary care provider, please call [phone] and they will assist you.        Care EveryWhere ID     This is your Care EveryWhere ID. This could be used by other organizations to access your [place] medical records  [id number]        Your Vitals Were     Pulse Temperature Respirations Pulse Oximetry          74 97.5  F (36.4  C) (Oral) 20 96%         Blood Pressure from Last 3 Encounters:   11/06/17 116/70   11/01/17 112/68   09/29/17 124/80    Weight from Last 3 Encounters:   11/01/17 170 lb 14.4 oz (77.5 kg)   09/29/17 171 lb  (77.6 kg)   09/13/17 175 lb (79.4 kg)              We Performed the Following     PODIATRY/FOOT & ANKLE SURGERY REFERRAL          Today's Medication Changes          These changes are accurate as of: 11/6/17  5:43 PM.  If you have any questions, ask your nurse or doctor.               These medicines have changed or have updated prescriptions.        Dose/Directions    * order for DME   This may have changed:  Another medication with the same name was added. Make sure you understand how and when to take each.   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   Quantity:  1 Units   Refills:  0       * order for DME   This may have changed:  You were already taking a medication with the same name, and this prescription was added. Make sure you understand how and when to take each.   Used for:  Closed displaced fracture of medial malleolus of right tibia, initial encounter   Changed by:  Jacinto Alejandro MD        Wear boot   Quantity:  1 Act   Refills:  0       * Notice:  This list has 2 medication(s) that are the same as other medications prescribed for you. Read the directions carefully, and ask your doctor or other care provider to review them with you.         Where to get your medicines      Some of these will need a paper prescription and others can be bought over the counter.  Ask your nurse if you have questions.     Bring a paper prescription for each of these medications     order for DME                Primary Care Provider Office Phone # Fax #    Kelli Del Toro [phone] [address] [place] [address]        Equal Access to Services     [facility] AH: Hadii claudia ku hadasho Soomaali, waaxda luqadaha, qaybta kaalmada bree, cliff montanez. So [facility] [phone].    ATENCIÓN: kathi Guo  disposición servicios gratuitos de asistencia lingüística. Prabhakar hook [phone].    We comply with applicable federal civil rights laws and [place] laws. We do not discriminate on the basis of race, color, national origin, age, disability, sex, sexual orientation, or gender identity.            Thank you!     Thank you for choosing [facility]  for your care. Our goal is always to provide you with excellent care. Hearing back from our patients is one way we can continue to improve our services. Please take a few minutes to complete the written survey that you may receive in the mail after your visit with us. Thank you!             Your Updated Medication List - Protect others around you: Learn how to safely use, store and throw away your medicines at www.disposemymeds.org.          This list is accurate as of: 11/6/17  5:43 PM.  Always use your most recent med list.                   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  Do not apply to face.    Vaginal itching       gabapentin 300 MG capsule    NEURONTIN    90 capsule    Take 1 capsule (300 mg) by mouth At Bedtime    Peripheral neuropathy, idiopathic       hydrochlorothiazide 12.5 MG Tabs tablet     90 tablet    Take 1  tablet (12.5 mg) by mouth daily    Benign essential hypertension       metoprolol 25 MG tablet    LOPRESSOR    180 tablet    Take 1 tablet (25 mg) by mouth 2 times daily    Essential hypertension, benign       NIFEdipine ER osmotic 60 MG Tb24    PROCARDIA XL    90 tablet    TAKE 1 TABLET DAILY    Essential hypertension, benign       NYSTOP 104956 UNIT/GM Powd   Generic drug:  nystatin     15 g    Apply to affected area 2x daily    Yeast infection of the skin       * order for DME     1 Units    Equipment being ordered: Electric Wheelchair for home use.    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 Read the directions carefully, and ask your doctor or other care provider to review them with you.

## 2017-11-06 NOTE — PROGRESS NOTES
SUBJECTIVE:  Chief Complaint   Patient presents with     Urgent Care     Trauma     Right ankle injury. Patient fell getting into the car.     Kat Gomez is a 88 year old female presents with a chief complaint of right leg swelling and redness.  The injury occurred 4 day(s) ago.   The injury happened while getting into car. How: fall immediate pain, was able to bear weight directly after injury, deformity was immediately noted.  The patient complained of mild pain  and has had decreased ROM.  Pain exacerbated by nothing.  Relieved by nothing.  She treated it initially with a brace. This is not the first time this type of injury has occurred to this patient.     Pt has persistent swelling of lower leg.  She has complication of anticoagulation.  Pain is better now.  Has been walking on it.    PMH cerebellar ataxia as well as neuropathy.    Past Medical History:   Diagnosis Date     Adjustment disorder with depressed mood 9/25/2013     Aortic valve disorders     AVR 2003     Ascending aortic dissection (H)     s/p repair and resuspension of her native AV     Complete heart block (H) 7/19/2016     Coronary artery disease      Hyperlipidaemia      Hypertension      LISSET (obstructive sleep apnea)      Paroxysmal supraventricular tachycardia (H)      Strabismus      Syncope      Current Outpatient Prescriptions   Medication Sig Dispense Refill     ASPIRIN NOT PRESCRIBED (INTENTIONAL) Please choose reason not prescribed, below 0 each 0     hydrochlorothiazide 12.5 MG TABS tablet Take 1 tablet (12.5 mg) by mouth daily 90 tablet 1     trospium (SANCTURA XR) 60 MG CP24 24 hr capsule Take 1 capsule (60 mg) by mouth every morning 90 each 3     NIFEdipine ER osmotic (PROCARDIA XL) 60 MG TB24 TAKE 1 TABLET DAILY 90 tablet 1     simvastatin (ZOCOR) 40 MG tablet TAKE 1 TABLET AT BEDTIME 90 tablet 0     apixaban ANTICOAGULANT (ELIQUIS) 2.5 MG tablet Take 1 tablet (2.5 mg) by mouth 2 times daily 180 tablet 3     metoprolol  (LOPRESSOR) 25 MG tablet Take 1 tablet (25 mg) by mouth 2 times daily 180 tablet 3     amantadine HCl 100 MG TABS One pill in am 270 tablet 2     gabapentin (NEURONTIN) 300 MG capsule Take 1 capsule (300 mg) by mouth At Bedtime 90 capsule 3     clobetasol (TEMOVATE) 0.05 % cream Apply sparingly to affected area once daily as needed.  Do not apply to face. 60 g 1     Nystatin (NYSTOP) 066499 UNIT/GM POWD Apply to affected area 2x daily 15 g 1     acetaminophen (TYLENOL) 325 MG tablet Take 2 tablets (650 mg) by mouth every 4 hours as needed for mild pain       ORDER FOR DME Equipment being ordered: Electric Wheelchair for home use. 1 Units 0     Social History   Substance Use Topics     Smoking status: Never Smoker     Smokeless tobacco: Never Used     Alcohol use No      Comment: glass of wine once a week       ROS:  CONSTITUTIONAL:NEGATIVE for fever, chills, change in weight  INTEGUMENTARY/SKIN: NEGATIVE for worrisome rashes, moles or lesions, POSITIVE for blistering and discoloration MUSCULOSKELETAL: NEGATIVE for significant arthralgias or myalgia and POSITIVE  for muscle spasm with tremor  NEURO: NEGATIVE for weakness, dizziness or paresthesias and POSITIVE for gait disturbance    EXAM:   /70 (BP Location: Right arm, Patient Position: Chair, Cuff Size: Adult Regular)  Pulse 74  Temp 97.5  F (36.4  C) (Oral)  Resp 20  SpO2 96%  Gen: Sitting in walker and healthy,alert,no distress  Extremity: leg has erythema, swelling and decreased ROM .   There is not compromise to the distal circulation.  Pulses are +2 and CRT is brisk  GENERAL APPEARANCE: healthy, alert and no distress  EXTREMITIES: peripheral pulses normal  SKIN: ecchymoses - lower legs and blistering from edema  NEURO: mentation intact, speech normal and gait abnormal using walker  LYMPHATICS: lymphedema in RLE    X-RAY tib/fib and foot done.    Medial malleolar fx.    ASSESSMENT:   Complicated patient with multiple comorbid conditions and  neuropathy.  Also with excessive ecchymosis due to anticoagulation.  No evidence for cellulitis at this point.  Skin breakdown is a risk with the edema.    fracture of ankle    PLAN:  1) boot for immobilization.  F/U with Podiatry here this week.    Patient Instructions       Wear Boot.  Use walker when walking  See Podiatry here Wed Morning.      Understanding an Ankle Fracture    The ankle is formed by bones in the lower leg (tibia and fibula) and the bone on top of the foot (talus). When you have a fracture of the ankle, it means that one or more of the bones in the ankle are broken. The bone may be cracked, broken into two or more pieces, or even shattered. The pieces of bone may be lined up or they may have moved out of place. Sometimes, the bone may break through the skin. Nearby ligaments may also be damaged. Depending on how badly the bone is broken, healing may take a few months or longer.   What causes an ankle fracture?  Ankle fractures are often caused from severely twisting or rolling the ankle. They may also be caused from a fall, blow, accident, or sports injury.  Symptoms of an ankle fracture  Symptoms can include pain, swelling, and bruising. If the bone breaks through the skin, bleeding at the site can also occur. The ankle may look crooked, deformed, or bent. Also, it may be hard to move or use the ankle and foot as you would normally.  Treating an ankle fracture  Treatment for an ankle fracture depends on where the bone is broken and how serious the break is. If needed, the bone is put back into place. This may be done with or without surgery. If surgery is needed, the surgeon may use devices such as pins, plates, or screws to hold the bone together. Usually, you will wear a splint, brace, or short leg cast to keep the bone in place and protect it from injury during healing. Other treatments may also be used to help reduce symptoms or regain function. These include:    Rest. You may need to avoid  walking or putting any weight on the broken ankle for a period of time. Severe fractures need a longer limit on weight-bearing activities.    Cold packs. Putting an ice pack over the injured area may help reduce swelling and pain.    Compression. An elastic bandage may be wrapped around the ankle to help reduce swelling.    Elevation. Propping up the ankle so that it is above your heart may ease swelling.    Pain medicines. Prescription or over-the-counter pain medicines may help reduce pain and swelling. If needed, stronger pain medicines may be prescribed.    Exercises. Your healthcare provider may give you certain exercises to do at home or with a physical therapist. These help restore strength, flexibility, and range of motion in the ankle and foot.  Possible complications of an ankle fracture  These can include:    Poor healing of the bone    Weakness, stiffness, or loss of range of motion in the ankle    Osteoarthritis in the ankle  When to call your healthcare provider  Call your healthcare provider right away if you have any of these:    Fever of 100.4 F (38 C) or higher, or as directed    Symptoms that don t get better with treatment, or get worse    Numbness, tingling, or coldness in your foot or toes    Toenails that turn blue or grey in color    A splint, brace, or cast that is damaged or feels too tight or loose    Unusual redness, warmth, swelling, bleeding, or drainage from any wounds or incision sites    New symptoms   Date Last Reviewed: 3/10/2016    2752-3118 [employer]. [address] [place] [address]. All rights reserved. This information is not intended as a substitute for professional medical care. Always follow your healthcare professional's instructions.

## 2017-11-06 NOTE — PATIENT INSTRUCTIONS
Wear Boot.  Use walker when walking  See Podiatry here Wed Morning.      Understanding an Ankle Fracture    The ankle is formed by bones in the lower leg (tibia and fibula) and the bone on top of the foot (talus). When you have a fracture of the ankle, it means that one or more of the bones in the ankle are broken. The bone may be cracked, broken into two or more pieces, or even shattered. The pieces of bone may be lined up or they may have moved out of place. Sometimes, the bone may break through the skin. Nearby ligaments may also be damaged. Depending on how badly the bone is broken, healing may take a few months or longer.   What causes an ankle fracture?  Ankle fractures are often caused from severely twisting or rolling the ankle. They may also be caused from a fall, blow, accident, or sports injury.  Symptoms of an ankle fracture  Symptoms can include pain, swelling, and bruising. If the bone breaks through the skin, bleeding at the site can also occur. The ankle may look crooked, deformed, or bent. Also, it may be hard to move or use the ankle and foot as you would normally.  Treating an ankle fracture  Treatment for an ankle fracture depends on where the bone is broken and how serious the break is. If needed, the bone is put back into place. This may be done with or without surgery. If surgery is needed, the surgeon may use devices such as pins, plates, or screws to hold the bone together. Usually, you will wear a splint, brace, or short leg cast to keep the bone in place and protect it from injury during healing. Other treatments may also be used to help reduce symptoms or regain function. These include:    Rest. You may need to avoid walking or putting any weight on the broken ankle for a period of time. Severe fractures need a longer limit on weight-bearing activities.    Cold packs. Putting an ice pack over the injured area may help reduce swelling and pain.    Compression. An elastic bandage may be  wrapped around the ankle to help reduce swelling.    Elevation. Propping up the ankle so that it is above your heart may ease swelling.    Pain medicines. Prescription or over-the-counter pain medicines may help reduce pain and swelling. If needed, stronger pain medicines may be prescribed.    Exercises. Your healthcare provider may give you certain exercises to do at home or with a physical therapist. These help restore strength, flexibility, and range of motion in the ankle and foot.  Possible complications of an ankle fracture  These can include:    Poor healing of the bone    Weakness, stiffness, or loss of range of motion in the ankle    Osteoarthritis in the ankle  When to call your healthcare provider  Call your healthcare provider right away if you have any of these:    Fever of 100.4 F (38 C) or higher, or as directed    Symptoms that don t get better with treatment, or get worse    Numbness, tingling, or coldness in your foot or toes    Toenails that turn blue or grey in color    A splint, brace, or cast that is damaged or feels too tight or loose    Unusual redness, warmth, swelling, bleeding, or drainage from any wounds or incision sites    New symptoms   Date Last Reviewed: 3/10/2016    2098-7338 [employer]. [address] [place] [address], [place] [address]. All rights reserved. This information is not intended as a substitute for professional medical care. Always follow your healthcare professional's instructions.

## 2017-11-08 NOTE — PROGRESS NOTES
"  ASSESSMENT/PLAN:  Encounter Diagnoses   Name Primary?     Injury of right ankle, initial encounter Yes     Closed fracture of right ankle, initial encounter      I reviewed the x-ray images with the patient and her son.     Bimalleolar right ankle fracture.  Displacement of ~2 mm.      Given her age, osteopenia, the fact that she is having minimal pain, I think conservative treatment is appropriate. She and her son are fine with this plan and did not not consider surgery an option to begin with.     PRICE therapy  CAM walker 6+ weeks  ACE compression and elevation for better edema control  They are to monitor the erythema and ecchymosis.  I think the erythema is from the edema.  If it does not resolve, or if getting worse, they are to inform us    Follow up in 1 month      Body mass index is 33.2 kg/(m^2).    Weight management plan: Patient was referred to their PCP to discuss a diet and exercise plan.      Asad Peter DPM, FACFAS, MS    [place] [facility]      ____________________________________________________________________    HPI:         Chief Complaint: \"cracked ankle\"  (right)  Onset of problem: 2017;  She said that she was staring to sit down in a taxi cab, slipped and fell  Pain/ discomfort is described as:  Swelling, ache  Ratin/10   Frequency:  intermittent    The pain is made worse with walking, sitting with foot down  Previous treatment: urgent care, CAM walker, elevation, Arnica cream  *  Past Medical History:   Diagnosis Date     Adjustment disorder with depressed mood 2013     Aortic valve disorders     AVR      Ascending aortic dissection (H)     s/p repair and resuspension of her native AV     Complete heart block (H) 2016     Coronary artery disease      Hyperlipidaemia      Hypertension      LISSET (obstructive sleep apnea)      Paroxysmal supraventricular tachycardia (H)      Strabismus      Syncope    *  *  Past Surgical History: "   Procedure Laterality Date     CATARACT IOL, RT/LT       CORONARY ANGIOGRAPHY ADULT ORDER      2003     CORONARY ARTERY BYPASS      SVG to LAD and SVG to RCA     CYSTOCELE REPAIR       ENDOVASCULAR REPAIR, INFRARENAL ABDOMINAL AORTIC ANEURYSM/DISSECTION; MODULAR BIFURCATED PROSTHESIS      Emergent repair of aorta and resuspension of aortic valve with two vessel bypass in 2003     FRACTURE TX, HIP RT/LT      Fracture TX Hip RT/LT     IMPLANT PACEMAKER  7/2016    dual chamber pacemaker via right subclavian vein without complication     REPLACE VALVE AORTIC      Bioprosthetic valve in 2003   *  *  Current Outpatient Prescriptions   Medication Sig Dispense Refill     order for DME Wear boot 1 Act 0     ASPIRIN NOT PRESCRIBED (INTENTIONAL) Please choose reason not prescribed, below 0 each 0     hydrochlorothiazide 12.5 MG TABS tablet Take 1 tablet (12.5 mg) by mouth daily 90 tablet 1     trospium (SANCTURA XR) 60 MG CP24 24 hr capsule Take 1 capsule (60 mg) by mouth every morning 90 each 3     NIFEdipine ER osmotic (PROCARDIA XL) 60 MG TB24 TAKE 1 TABLET DAILY 90 tablet 1     simvastatin (ZOCOR) 40 MG tablet TAKE 1 TABLET AT BEDTIME 90 tablet 0     apixaban ANTICOAGULANT (ELIQUIS) 2.5 MG tablet Take 1 tablet (2.5 mg) by mouth 2 times daily 180 tablet 3     metoprolol (LOPRESSOR) 25 MG tablet Take 1 tablet (25 mg) by mouth 2 times daily 180 tablet 3     amantadine HCl 100 MG TABS One pill in am 270 tablet 2     gabapentin (NEURONTIN) 300 MG capsule Take 1 capsule (300 mg) by mouth At Bedtime 90 capsule 3     clobetasol (TEMOVATE) 0.05 % cream Apply sparingly to affected area once daily as needed.  Do not apply to face. 60 g 1     Nystatin (NYSTOP) 022807 UNIT/GM POWD Apply to affected area 2x daily 15 g 1     acetaminophen (TYLENOL) 325 MG tablet Take 2 tablets (650 mg) by mouth every 4 hours as needed for mild pain       ORDER FOR DME Equipment being ordered: Electric Wheelchair for home use. 1 Units 0       ROS:     A  "10-point review of systems was performed and is positive for that noted in the HPI and as seen below.  All other areas are negative.     Numbness in feet?  no   Calf pain with walking? no  Recent foot/ankle injury? See HPI  Weight change over past 12 months? no  Self perception as overweight? yes  Recent flu-like symptoms? no  Joint pain other than feet ? no    Social History: Employment:  retired;  Exercise/Physical activity:  3X/ week;  Tobacco use:  no  Social History     Social History     Marital status:      Spouse name: N/A     Number of children: N/A     Years of education: N/A     Occupational History     Not on file.     Social History Main Topics     Smoking status: Never Smoker     Smokeless tobacco: Never Used     Alcohol use No      Comment: glass of wine once a week     Drug use: No     Sexual activity: Not Currently     Other Topics Concern     Parent/Sibling W/ Cabg, Mi Or Angioplasty Before 65f 55m? No     Caffeine Concern No     Sleep Concern No     Special Diet No     Exercise No     Seat Belt Yes     Social History Narrative    , lives alone in own home.  Moved back here in 2003.  Retired from home care.         Family history:  Family History   Problem Relation Age of Onset     CEREBROVASCULAR DISEASE Mother      Coronary Artery Disease Mother      Hyperlipidemia Mother      Prostate Cancer Father      Hyperlipidemia Father      DIABETES Sister      Neurologic Disorder Sister      cerebellar ataxia     GASTROINTESTINAL DISEASE Sister      autoimmune hepatitis     Coronary Artery Disease Son      Breast Cancer Son      Colon Cancer Son      DIABETES Daughter      Breast Cancer Daughter        Rheumatoid arthritis:  no  Foot Problems: no  Diabetes: no      EXAM:    Vitals: Resp 18  Ht 5' (1.524 m)  Wt 170 lb (77.1 kg)  BMI 33.2 kg/m2  BMI: Body mass index is 33.2 kg/(m^2).  Height: 5' 0\"    Constitutional/ general:  Pt is in no apparent distress, appears well-nourished.  " Cooperative with history and physical exam.     Psych:  The patient answered questions appropriately.  Normal affect.  Seems to have reasonable expectations, in terms of treatment.     Eyes:  Visual scanning/ tracking without deficit.     Ears:  Response to auditory stimuli is normal.  Hard of hearing.  Auricles in proper alignment.     Lymphatic:  Popliteal lymph nodes not enlarged.     Lungs:  Non labored breathing, non labored speech. No cough.  No audible wheezing. Even, quiet breathing.       Vascular:  Pedal pulses are faintly palpable bilaterally for both the DP and PT arteries.  CFT < 3 sec.       Neuro:  Alert and oriented x 3. Coordinated gait.  Light touch sensation is intact to the L4, L5, S1 distributions. No obvious deficits.  No evidence of neurological-based weakness, spasticity, or contracture in the lower extremities.     Derm:  Erythema, bruising dorsal foot and around the ankle.  Blister on dorsum of right foot.      Musculoskeletal:    Lower extremity muscle strength is normal.  Patient is ambulatory without an assistive device or brace .  No gross deformities.      Significant edema around the right ankle.       Radiographic Exam:  X-Ray Findings:  3 views of the right ankle.   I personally reviewed the films.  Fractures of the medial and lateral malleolus with some displacement, 1.5-2mm.      Asad Peter DPM, FACFAS, MS    [place] [facility] & Ankle [facility]

## 2017-11-08 NOTE — NURSING NOTE
Chief Complaint   Patient presents with     Foot Problems     R ankle fx DOI 11/2/17, not much pain at all, wearing CAM        Initial Resp 18  Ht 5' (1.524 m)  Wt 170 lb (77.1 kg)  BMI 33.2 kg/m2 Estimated body mass index is 33.2 kg/(m^2) as calculated from the following:    Height as of this encounter: 5' (1.524 m).    Weight as of this encounter: 170 lb (77.1 kg).  Medication Reconciliation: complete    Jose Luis Ibanez CMA (AAMA)  Podiatry / Foot & Ankle Surgery  [facility]

## 2017-11-08 NOTE — MR AVS SNAPSHOT
After Visit Summary   11/8/2017    Kat Gomez    MRN: [id number]           Patient Information     Date Of Birth          12/1/1928        Visit Information        Provider Department      11/8/2017 11:30 AM Asad Jamison DPM [facility]        Today's Diagnoses     Injury of right ankle, initial encounter    -  1    Closed fracture of right ankle, initial encounter          Care Instructions    DR. JAMISON'S CLINIC LOCATIONS     MONDAY / FRIDAY - [facility] WEDNESDAY - [place]   [address] [address] [place] [address]   [phone] / [phone] / [phone]       THURSDAY - [place] SCHEDULE SURGERY: [phone]   [address] APPOINTMENTS: [phone]   [place] [address] BILLING QUESTIONS: [phone] / [phone]       PRICE THERAPY    Many aches and pains throughout the foot and ankle can be helped with many simple treatments. This is usually described as PRICE Therapy.      P - Protection - often times, inflammation/pain in the lower extremity is not able to improve simply because the areas involved are never allowed to rest. Every step we take can bother the problematic area. Protecting those areas is an important step in the healing process. This may involve a walking cast boot, a special insert/orthotic device, an ankle brace, or simply avoiding barefoot walking.    R - Rest - in addition to protecting the foot/ankle, resting is an important, but often times difficult, treatment option. Getting off your feet when they bother you, and specifically avoiding activities that cause pain/discomfort, are very beneficial to prevent, and treat, foot/ankle pain.      I - Ice - icing regularly can help to decrease inflammation and swelling in the foot, thus decreasing pain. Using an ice pack or a bag of frozen veggies works very well. Ice for 20 minutes multiple times per day as needed.   Do not place the ice directly on the skin as this can cause tissue damage.    C - Compression - using a compression wrap or an ACE wrap can help to decrease swelling, which can help to decrease pain. Wearing the wraps is generally not needed at night, but they should be worn on a regular basis when you are going to be on your feet for prolonged periods as gravity tends to pull fluids down to your feet/ankles.    E - Elevation - elevating your lower extremities multiple times daily for 15-20 minutes can help to decrease swelling, which works well in decreasing pain levels.    NSAID/Tylenol - Anti-inflammatories like Aleve or ibuprofen, and/or a pain medication, such as Tylenol, can help to improve pain levels and get the issue resolved sooner rather than later. Anyone with liver issues should be careful with Tylenol, and anyone with high blood pressure or heart, stomach or kidney issues should be careful with anti-inflammatories. Please ask if you have questions about these medications, including dosage.      TOE & METATARSAL FRACTURES  The structure of the foot is complex, consisting of bones, muscles, tendons, and other soft tissues. Of the 26 bones in the foot, 19 are toe bones (phalanges) and metatarsal bones (the long bones in the midfoot). Fractures of the toe and metatarsal bones are common and require evaluation by a specialist. A foot and ankle surgeon should be seen for proper diagnosis and treatment, even if initial treatment has been received in an emergency room.  A fracture is a break in the bone. Fractures can be divided into two categories: traumatic fractures and stress fractures.  TRAMATIC FRACTURES (also called acute fractures) are caused by a direct blow or impact, such as seriously stubbing your toe. Traumatic fractures can be displaced or non-displaced. If the fracture is displaced, the bone is broken in such a way that it has changed in position (dislocated).  Signs and symptoms of a traumatic  fracture include:  You may hear a sound at the time of the break.    Pinpoint pain  (pain at the place of impact) at the time the fracture occurs and perhaps for a few hours later, but often the pain goes away after several hours.   Crooked or abnormal appearance of the toe.   Bruising and swelling the next day.   It is not true that  if you can walk on it, it s not broken.  Evaluation by a foot and ankle surgeon is always recommended.   STRESS FRACTURES are tiny, hairline breaks that are usually caused by repetitive stress. Stress fractures often afflict athletes who, for example, too rapidly increase their running mileage. They can also be caused by an abnormal foot structure, deformities, or osteoporosis. Improper footwear may also lead to stress fractures. Stress fractures should not be ignored. They require proper medical attention to heal correctly.  Symptoms of stress fractures include:  Pain with or after normal activity   Pain that goes away when resting and then returns when standing or during activity    Pinpoint pain  (pain at the site of the fracture) when touched   Swelling, but no bruising   IMPROPER TREATMENT  Some people say that  the doctor can t do anything for a broken bone in the foot.  This is usually not true. In fact, if a fractured toe or metatarsal bone is not treated correctly, serious complications may develop. For example:  A deformity in the bony architecture which may limit the ability to move the foot or cause difficulty in fitting shoes   Arthritis, which may be caused by a fracture in a joint (the juncture where two bones meet), or may be a result of angular deformities that develop when a displaced fracture is severe or hasn t been properly corrected   Chronic pain and deformity   Non-union, or failure to heal, can lead to subsequent surgery or chronic pain.   PROPER TREATMENT FOR TOES  Fractures of the toe bones are almost always traumatic fractures. Treatment for traumatic  fractures depends on the break itself and may include these options:  Rest. Sometimes rest is all that is needed to treat a traumatic fracture of the toe.   Splinting. The toe may be fitted with a splint to keep it in a fixed position.   Rigid or stiff-soled shoe. Wearing a stiff-soled shoe protects the toe and helps keep it properly positioned.    Cesar taping  the fractured toe to another toe is sometimes appropriate, but in other cases it may be harmful.   Surgery. If the break is badly displaced or if the joint is affected, surgery may be necessary. Surgery often involves the use of fixation devices, such as pins.   PROPER TREATMENT OF METATARSALS  Breaks in the metatarsal bones may be either stress or traumatic fractures. Certain kinds of fractures of the metatarsal bones present unique challenges.  For example, sometimes a fracture of the first metatarsal bone (behind the big toe) can lead to arthritis. Since the big toe is used so frequently and bears more weight than other toes, arthritis in that area can make it painful to walk, bend, or even stand.  Another type of break, called a Cruz fracture, occurs at the base of the fifth metatarsal bone (behind the little toe). It is often misdiagnosed as an ankle sprain, and misdiagnosis can have serious consequences since sprains and fractures require different treatments. Your foot and ankle surgeon is an expert in correctly identifying these conditions as well as other problems of the foot.  Treatment of metatarsal fractures depends on the type and extent of the fracture, and may include:  Rest. Sometimes rest is the only treatment needed to promote healing of a stress or traumatic fracture of a metatarsal bone.   Avoid the offending activity. Because stress fractures result from repetitive stress, it is important to avoid the activity that led to the fracture. Crutches or a wheelchair are sometimes required to offload weight from the foot to give it time to  heal.   Immobilization, casting, or rigid shoe. A stiff-soled shoe or other form of immobilization may be used to protect the fractured bone while it is healing.   Surgery. Some traumatic fractures of the metatarsal bones require surgery, especially if the break is badly displaced.   Follow-up care. Your foot and ankle surgeon will provide instructions for care following surgical or non-surgical treatment. Physical therapy, exercises and rehabilitation may be included in a schedule for return to normal activities.       _______________________________________________________________________    Send a friend or family member to see Dr. Peter and recieve a [employer] discount. Just have them mention your name at their appointment and we will mail you the discount information.   _______________________________________________________________________    BODY MASS INDEX (BMI)  Many things can cause foot and ankle problems. Foot structure, activity level, foot mechanics and injuries are common causes of pain.  One very important issue that often goes unmentioned, is body weight.  Extra weight can cause increased stress on muscles, ligaments, bones and tendons.  Sometimes just a few extra pounds is all it takes to put one over her/his threshold. Without reducing that stress, it can be difficult to alleviate pain. Some people are uncomfortable addressing this issue, but we feel it is important for you to think about it. As Foot &  Ankle specialists, our job is addressing the lower extremity problem and possible causes. Regarding extra body weight, we encourage patients to discuss diet and weight management plans with their primary care doctors. It is this team approach that gives you the best opportunity for pain relief and getting you back on your feet.                Follow-ups after your visit        Your next 10 appointments already scheduled     Nov 13, 2017 11:00 AM CST   LAB with EA LAB   [facility] [place]  ([facility]    [address]  [address]  [place] [address] [phone]           Please do not eat 10-12 hours before your appointment if you are coming in fasting for labs on lipids, cholesterol, or glucose (sugar). This does not apply to pregnant women. Water, hot tea and black coffee (with nothing added) are okay. Do not drink other fluids, diet soda or chew gum.            Dec 27, 2017 10:15 AM CST   Return Visit with Lucius Jiménez MD   [facility] ([facility])    [address]  [place] [address]   [phone]            Jan 09, 2018 11:45 AM CST   Remote PPM Check with BEDOLLA TECH1   [facility] ([facility])    [address]  [place] [address] [phone]           This appointment is for a remote check of your pacemaker.  This is not an appointment at the office.            Apr 04, 2018  9:00 AM CDT   Office Visit with Kelli Del Toro MD   [facility] ([facility]    [address]  [address]  [place] [address] [phone]           Bring a current list of meds and any records pertaining to this visit. For Physicals, please bring immunization records and any forms needing to be filled out. Please arrive 10 minutes early to complete paperwork.              Who to contact     If you have questions or need follow up information about today's clinic visit or your schedule please contact [facility] directly at [phone].  Normal or non-critical lab and imaging results will be communicated to you by MyChart, letter or phone within 4 business days after the clinic has received the results. If you do not hear from us within 7 days, please contact the clinic through MyChart or phone. If you have a critical or abnormal lab result, we will notify you by phone as soon as  possible.  Submit refill requests through [employer] or call your pharmacy and they will forward the refill request to us. Please allow 3 business days for your refill to be completed.          Additional Information About Your Visit        Ag[employer] Information     [employer] gives you secure access to your electronic health record. If you see a primary care provider, you can also send messages to your care team and make appointments. If you have questions, please call your primary care clinic.  If you do not have a primary care provider, please call [phone] and they will assist you.        Care EveryWhere ID     This is your Care EveryWhere ID. This could be used by other organizations to access your [place] medical records  [id number]        Your Vitals Were     Respirations Height BMI (Body Mass Index)             18 5' (1.524 m) 33.2 kg/m2          Blood Pressure from Last 3 Encounters:   11/06/17 116/70   11/01/17 112/68   09/29/17 124/80    Weight from Last 3 Encounters:   11/08/17 170 lb (77.1 kg)   11/01/17 170 lb 14.4 oz (77.5 kg)   09/29/17 171 lb (77.6 kg)               Primary Care Provider Office Phone # Fax #    Kelli Del Toro [phone] [address] [place] [address]        Equal Access to Services     [facility] AH: Hadii aad ku hadasho Soomaali, waaxda luqadaha, qaybta kaalmada adeegyada, cliff pollard haykarisn isabel rodriguez . So [facility] [phone].    ATENCIÓN: Si habla español, tiene a watts disposición servicios gratuitos de asistencia lingüística. Llame al [phone].    We comply with applicable federal civil rights laws and [place] laws. We do not discriminate on the basis of race, color, national origin, age, disability, sex, sexual orientation, or gender identity.            Thank you!     Thank you for choosing [facility]  for your care. Our goal is always to provide you with excellent care. Hearing back from our patients is  one way we can continue to improve our services. Please take a few minutes to complete the written survey that you may receive in the mail after your visit with us. Thank you!             Your Updated Medication List - Protect others around you: Learn how to safely use, store and throw away your medicines at www.disposemymeds.org.          This list is accurate as of: 11/8/17 11:59 AM.  Always use your most recent med list.                   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  Do not apply to face.    Vaginal itching       gabapentin 300 MG capsule    NEURONTIN    90 capsule    Take 1 capsule (300 mg) by mouth At Bedtime    Peripheral neuropathy, idiopathic       hydrochlorothiazide 12.5 MG Tabs tablet     90 tablet    Take 1 tablet (12.5 mg) by mouth daily    Benign essential hypertension       metoprolol 25 MG tablet    LOPRESSOR    180 tablet    Take 1 tablet (25 mg) by mouth 2 times daily    Essential hypertension, benign       NIFEdipine ER osmotic 60 MG Tb24    PROCARDIA XL    90 tablet    TAKE 1 TABLET DAILY    Essential hypertension, benign       NYSTOP 815602 UNIT/GM Powd   Generic drug:  nystatin     15 g    Apply to affected area 2x daily    Yeast infection of the skin       *  order for DME     1 Units    Equipment being ordered: Electric Wheelchair for home use.    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 Read the directions carefully, and ask your doctor or other care provider to review them with you.

## 2017-11-08 NOTE — LETTER
"    2017         RE: Kat Gomez  [address] [facility] [id number]        Dear Colleague,    Thank you for referring your patient, Kat Gomez, to the [facility]. Please see a copy of my visit note below.      ASSESSMENT/PLAN:  Encounter Diagnoses   Name Primary?     Injury of right ankle, initial encounter Yes     Closed fracture of right ankle, initial encounter      I reviewed the x-ray images with the patient and her son.     Bimalleolar right ankle fracture.  Displacement of ~2 mm.      Given her age, osteopenia, the fact that she is having minimal pain, I think conservative treatment is appropriate. She and her son are fine with this plan and did not not consider surgery an option to begin with.     PRICE therapy  CAM walker 6+ weeks  ACE compression and elevation for better edema control  They are to monitor the erythema and ecchymosis.  I think the erythema is from the edema.  If it does not resolve, or if getting worse, they are to inform us    Follow up in 1 month      Body mass index is 33.2 kg/(m^2).    Weight management plan: Patient was referred to their PCP to discuss a diet and exercise plan.      Asad Peter DPM, FACFAS, MS    [facility] Podiatry/Foot & Ankle Surgery      ____________________________________________________________________    HPI:         Chief Complaint: \"cracked ankle\"  (right)  Onset of problem: 2017;  She said that she was staring to sit down in a taxi cab, slipped and fell  Pain/ discomfort is described as:  Swelling, ache  Ratin/10   Frequency:  intermittent    The pain is made worse with walking, sitting with foot down  Previous treatment: urgent care, CAM walker, elevation, Arnica cream  *  Past Medical History:   Diagnosis Date     Adjustment disorder with depressed mood 2013     Aortic valve disorders     AVR      Ascending aortic dissection (H)     s/p repair and resuspension of her native AV     " Complete heart block (H) 7/19/2016     Coronary artery disease      Hyperlipidaemia      Hypertension      LISSET (obstructive sleep apnea)      Paroxysmal supraventricular tachycardia (H)      Strabismus      Syncope    *  *  Past Surgical History:   Procedure Laterality Date     CATARACT IOL, RT/LT       CORONARY ANGIOGRAPHY ADULT ORDER      2003     CORONARY ARTERY BYPASS      SVG to LAD and SVG to RCA     CYSTOCELE REPAIR       ENDOVASCULAR REPAIR, INFRARENAL ABDOMINAL AORTIC ANEURYSM/DISSECTION; MODULAR BIFURCATED PROSTHESIS      Emergent repair of aorta and resuspension of aortic valve with two vessel bypass in 2003     FRACTURE TX, HIP RT/LT      Fracture TX Hip RT/LT     IMPLANT PACEMAKER  7/2016    dual chamber pacemaker via right subclavian vein without complication     REPLACE VALVE AORTIC      Bioprosthetic valve in 2003   *  *  Current Outpatient Prescriptions   Medication Sig Dispense Refill     order for DME Wear boot 1 Act 0     ASPIRIN NOT PRESCRIBED (INTENTIONAL) Please choose reason not prescribed, below 0 each 0     hydrochlorothiazide 12.5 MG TABS tablet Take 1 tablet (12.5 mg) by mouth daily 90 tablet 1     trospium (SANCTURA XR) 60 MG CP24 24 hr capsule Take 1 capsule (60 mg) by mouth every morning 90 each 3     NIFEdipine ER osmotic (PROCARDIA XL) 60 MG TB24 TAKE 1 TABLET DAILY 90 tablet 1     simvastatin (ZOCOR) 40 MG tablet TAKE 1 TABLET AT BEDTIME 90 tablet 0     apixaban ANTICOAGULANT (ELIQUIS) 2.5 MG tablet Take 1 tablet (2.5 mg) by mouth 2 times daily 180 tablet 3     metoprolol (LOPRESSOR) 25 MG tablet Take 1 tablet (25 mg) by mouth 2 times daily 180 tablet 3     amantadine HCl 100 MG TABS One pill in am 270 tablet 2     gabapentin (NEURONTIN) 300 MG capsule Take 1 capsule (300 mg) by mouth At Bedtime 90 capsule 3     clobetasol (TEMOVATE) 0.05 % cream Apply sparingly to affected area once daily as needed.  Do not apply to face. 60 g 1     Nystatin (NYSTOP) 137959 UNIT/GM POWD Apply to  affected area 2x daily 15 g 1     acetaminophen (TYLENOL) 325 MG tablet Take 2 tablets (650 mg) by mouth every 4 hours as needed for mild pain       ORDER FOR DME Equipment being ordered: Electric Wheelchair for home use. 1 Units 0       ROS:     A 10-point review of systems was performed and is positive for that noted in the HPI and as seen below.  All other areas are negative.     Numbness in feet?  no   Calf pain with walking? no  Recent foot/ankle injury? See HPI  Weight change over past 12 months? no  Self perception as overweight? yes  Recent flu-like symptoms? no  Joint pain other than feet ? no    Social History: Employment:  retired;  Exercise/Physical activity:  3X/ week;  Tobacco use:  no  Social History     Social History     Marital status:      Spouse name: N/A     Number of children: N/A     Years of education: N/A     Occupational History     Not on file.     Social History Main Topics     Smoking status: Never Smoker     Smokeless tobacco: Never Used     Alcohol use No      Comment: glass of wine once a week     Drug use: No     Sexual activity: Not Currently     Other Topics Concern     Parent/Sibling W/ Cabg, Mi Or Angioplasty Before 65f 55m? No     Caffeine Concern No     Sleep Concern No     Special Diet No     Exercise No     Seat Belt Yes     Social History Narrative    , lives alone in own home.  Moved back here in 2003.  Retired from home care.         Family history:  Family History   Problem Relation Age of Onset     CEREBROVASCULAR DISEASE Mother      Coronary Artery Disease Mother      Hyperlipidemia Mother      Prostate Cancer Father      Hyperlipidemia Father      DIABETES Sister      Neurologic Disorder Sister      cerebellar ataxia     GASTROINTESTINAL DISEASE Sister      autoimmune hepatitis     Coronary Artery Disease Son      Breast Cancer Son      Colon Cancer Son      DIABETES Daughter      Breast Cancer Daughter        Rheumatoid arthritis:  no  Foot Problems:  "no  Diabetes: no      EXAM:    Vitals: Resp 18  Ht 5' (1.524 m)  Wt 170 lb (77.1 kg)  BMI 33.2 kg/m2  BMI: Body mass index is 33.2 kg/(m^2).  Height: 5' 0\"    Constitutional/ general:  Pt is in no apparent distress, appears well-nourished.  Cooperative with history and physical exam.     Psych:  The patient answered questions appropriately.  Normal affect.  Seems to have reasonable expectations, in terms of treatment.     Eyes:  Visual scanning/ tracking without deficit.     Ears:  Response to auditory stimuli is normal.  Hard of hearing.  Auricles in proper alignment.     Lymphatic:  Popliteal lymph nodes not enlarged.     Lungs:  Non labored breathing, non labored speech. No cough.  No audible wheezing. Even, quiet breathing.       Vascular:  Pedal pulses are faintly palpable bilaterally for both the DP and PT arteries.  CFT < 3 sec.       Neuro:  Alert and oriented x 3. Coordinated gait.  Light touch sensation is intact to the L4, L5, S1 distributions. No obvious deficits.  No evidence of neurological-based weakness, spasticity, or contracture in the lower extremities.     Derm:  Erythema, bruising dorsal foot and around the ankle.  Blister on dorsum of right foot.      Musculoskeletal:    Lower extremity muscle strength is normal.  Patient is ambulatory without an assistive device or brace .  No gross deformities.      Significant edema around the right ankle.       Radiographic Exam:  X-Ray Findings:  3 views of the right ankle.   I personally reviewed the films.  Fractures of the medial and lateral malleolus with some displacement, 1.5-2mm.      Asad Peter DPM, FACFAS, MS    [facility] of Podiatry/Foot & Ankle Surgery                Again, thank you for allowing me to participate in the care of your patient.        Sincerely,        Asad Peter DPM    "

## 2017-11-22 PROBLEM — J96.91 RESPIRATORY FAILURE WITH HYPOXIA (H): Status: ACTIVE | Noted: 2017-01-01

## 2017-11-22 NOTE — H&P
[facility] Internal Medicine  History and Physical      Patient Name: Kat Gomez MRN# [id number]   Age: 88 year old YOB: 1928     Date of Admission:11/22/2017    Primary care provider: Kelli Del Toro  Date of Service: 11/22/2017         Assessment and Plan:   Kat Gomez is a 88 year old female with a history of Aortic Valve Repair, Ascending Aortic Dissection s/p repair, Complete Heart Block s/p PPM, CAD s/p bypass, HTN, HLD, LISSET not on cpap, Syncope, Paroxysmal SVT who presents to the ED today from the  clinic in [place] 2/2 shortness of breath and fatigue.      ED work up revealed patient tachypneic with oxygenation 90% on 4 liters with stable bp and hr and afebrile.  Laboratory work up revealed BUN 49, Creatinine 1.3, BNP 8316, wbc 12.4, hgb 11.5, ddimer 3.1 with otherwise normal BMP, Lactic Acid, VBG, CBC and UA.  EKG revealed dual AV paced rhythm.  Bedside echocardiogram with No pericardial effusion.  Left ventricle is hypokinetic and suggestive of systolic dysfunction.  CT chest/abd/pelvis revealed stable thoracic aortic dilation, no pericardial effusion with new bilateral pleural effusions and bilateral infiltrates.  Patient received Lasix 40mg IV, Ceftriaxone, Azithromycin and admitted for further management.    Acute Hypoxic Respiratory Failure 2/2 CHF and CAP - 2 days of increased shortness of breath, cough and fatigue.  Afebrile with normal wbc.  Elevated bnp with normal troponin.  Imaging c/w small bilateral pleural effusions and bilateral infiltrates.   Patient with an elevated ddimer as well, but has been compliant with chronic eliquis making PE less likely.  However does have a recent RLE ankle fracture putting her at increased risk for DVT  CT PE unable to be performed 2/2 CKD.  - received 40mg IV lasix now.  Check echocardiogram  - monitor I/O, daily weights, continuous pulse oximetry and telemetry    - Ceftriaxone, Azithromycin, Duonebs and Albuterol  -  US doppler LE now.  Consider V/Q scan  - continue home Eliquis    Hx of Paroxysmal Atrial Fibrillation and Nonsustained Ventricular Tachycardia - followed by Cardiology.  - continue home Eliquis and Metoprolol    CAD - s/p Two-vessel CABG in 2003 with SVG to LAD and SVG to RCA.  Continue Simvastatin, Eliquis, Metoprolol and Nifedipine.  ASA stopped previously 2/2 nosebleeds.    Hx of aortic dissection and resuspension of her native aortic valve - repair in 2003.  Followed by Cardiology.  CT today with stable findings.  - continue good BP control with Metoprolol and Nifedipine.  HCTZ on hold while on lasix    Hx Sick Sinus Syndrome s/p PPM - last PPM check 9/2017 noted paroxysmal afib and NSVT.  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  Uses a walker to ambulate.  On Amantadine and Gabapentin.     LISSET - does not use cpap    Bimalleolar Right Ankle Fracture - fell getting in to the car ~11/2/17.  Xrays with nondisplaced fractures of the medial and lateral  Malleolus.  Currently in a CAM boot walker for 6+ weeks.  Improving.      CODE: Full - discussed with POA at the bedside.  Would not like prolonged life support, but ok for a trial of resuscitation.  Diet/IVF: regular  GI ppx:  Tolerating a diet  DVT ppx: Eliquis    Patient discussed with Dr. Ce Still MS PA-C  Physician Assistant   Hospitalist Service  Pager: [phone]           Chief Complaint:   Shortness of breath, cough, fatigue         HPI:   88 year old female with a history of Aortic Valve Repair, Ascending Aortic Dissection s/p repair, Complete Heart Block s/p PPM, CAD s/p bypass, HTN, HLD, LISSET not on cpap, Syncope, Paroxysmal SVT who presents to the ED today from the [facility] clinic in [place] 2/2 shortness of breath and fatigue.    Patient presents with her children who provide additional history.   "Patient reports two days of increasing shortness of breath, fatigue and cough.  Son reports her breathing has been \"more noisy\" the past 2 days.  She is not on home oxygen.  Patient denies URI symptoms, sore throat, wheezing, chest pain, abdominal pain, nausea, emesis, diarrhea, dysuria, fevers, sick contacts.  She received her flu shot this year.  She denies lower extremity edema.  She reports she broke her right ankle ~3 weeks ago while she slipped getting into her car.  This is a non operative repair and she has been using a boot to ambulate.  Her family reports RLE swelling initially after the injury which has now resolved and she has been compliant with her chronic Eliquis.  She lives in a town home with her son and uses a walker to ambulate at baseline.      Patient presented to her clinic today where she was found to have an oxygen saturation of 81% on room air. She was then brought to the emergency department via EMS. She was placed on 4 liters of oxygen and her saturation was 94%.          Past Medical History:     Past Medical History:   Diagnosis Date     Adjustment disorder with depressed mood 9/25/2013     Aortic valve disorders     AVR 2003     Ascending aortic dissection (H)     s/p repair and resuspension of her native AV     Complete heart block (H) 7/19/2016     Coronary artery disease      Hyperlipidaemia      Hypertension      LISSET (obstructive sleep apnea)      Paroxysmal supraventricular tachycardia (H)      Strabismus      Syncope           Past Surgical History:     Past Surgical History:   Procedure Laterality Date     CATARACT IOL, RT/LT       CORONARY ANGIOGRAPHY ADULT ORDER      2003     CORONARY ARTERY BYPASS      SVG to LAD and SVG to RCA     CYSTOCELE REPAIR       ENDOVASCULAR REPAIR, INFRARENAL ABDOMINAL AORTIC ANEURYSM/DISSECTION; MODULAR BIFURCATED PROSTHESIS      Emergent repair of aorta and resuspension of aortic valve with two vessel bypass in 2003     FRACTURE TX, HIP RT/LT      " Fracture TX Hip RT/LT     IMPLANT PACEMAKER  7/2016    dual chamber pacemaker via right subclavian vein without complication     REPLACE VALVE AORTIC      Bioprosthetic valve in 2003          Social History:     Social History     Social History     Marital status:      Spouse name: N/A     Number of children: N/A     Years of education: N/A     Occupational History     Not on file.     Social History Main Topics     Smoking status: Never Smoker     Smokeless tobacco: Never Used     Alcohol use No      Comment: glass of wine once a week     Drug use: No     Sexual activity: Not Currently     Other Topics Concern     Parent/Sibling W/ Cabg, Mi Or Angioplasty Before 65f 55m? No     Caffeine Concern No     Sleep Concern No     Special Diet No     Exercise No     Seat Belt Yes     Social History Narrative    , lives alone in own home.  Moved back here in 2003.  Retired from home care.            Family History:     Family History   Problem Relation Age of Onset     CEREBROVASCULAR DISEASE Mother      Coronary Artery Disease Mother      Hyperlipidemia Mother      Prostate Cancer Father      Hyperlipidemia Father      DIABETES Sister      Neurologic Disorder Sister      cerebellar ataxia     GASTROINTESTINAL DISEASE Sister      autoimmune hepatitis     Coronary Artery Disease Son      Breast Cancer Son      Colon Cancer Son      DIABETES Daughter      Breast Cancer Daughter           Allergies:    No Known Allergies       Medications:     Prior to Admission medications    Medication Sig Last Dose Taking? Auth Provider   hydrochlorothiazide 12.5 MG TABS tablet Take 1 tablet (12.5 mg) by mouth daily 11/22/2017 at Unknown time Yes Kelli Del Toro MD   trospium (SANCTURA XR) 60 MG CP24 24 hr capsule Take 1 capsule (60 mg) by mouth every morning 11/22/2017 at Unknown time Yes Kelli Del Toro MD   NIFEdipine ER osmotic (PROCARDIA XL) 60 MG TB24 TAKE 1 TABLET DAILY 11/22/2017 at Unknown time Yes  Kelli Del Toro MD   simvastatin (ZOCOR) 40 MG tablet TAKE 1 TABLET AT BEDTIME 11/21/2017 at Unknown time Yes Kelli Del Toro MD   apixaban ANTICOAGULANT (ELIQUIS) 2.5 MG tablet Take 1 tablet (2.5 mg) by mouth 2 times daily 11/22/2017 at Unknown time Yes Lucius Jiménez MD   metoprolol (LOPRESSOR) 25 MG tablet Take 1 tablet (25 mg) by mouth 2 times daily 11/22/2017 at x1 Yes Lucius Jiménez MD   amantadine HCl 100 MG TABS One pill in am 11/22/2017 at Unknown time Yes Kelli Del Toro MD   gabapentin (NEURONTIN) 300 MG capsule Take 1 capsule (300 mg) by mouth At Bedtime 11/21/2017 at Unknown time Yes Kelli Del Toro MD   Cranberry 125 MG TABS  Unknown  Unknown, Entered By History   calcium carbonate-vitamin D 600-400 MG-UNIT CHEW  Unknown at Unknown time  Unknown, Entered By History   Pomegranate, Pun[place] granatum, (POMEGRANATE PO)  Unknown at Unknown time  Unknown, Entered By History   Magnesium Oxide (MAGOX 400 PO)  Unknown at Unknown time  Unknown, Entered By History   VITAMIN D, CHOLECALCIFEROL, PO Take 2,000 Units by mouth daily Unknown at Unknown time  Unknown, Entered By History   order for DME Wear boot Jacinto Oviedo MD   ASPIRIN NOT PRESCRIBED (INTENTIONAL) Please choose reason not prescribed, below Kelli Tripathi MD   clobetasol (TEMOVATE) 0.05 % cream Apply sparingly to affected area once daily as needed.  Do not apply to face. Unknown at Unknown time  Kelli Del Toro MD   Nystatin (NYSTOP) 546087 UNIT/GM POWD Apply to affected area 2x daily Unknown at Unknown time  Kelli Del Toro MD   acetaminophen (TYLENOL) 325 MG tablet Take 2 tablets (650 mg) by mouth every 4 hours as needed for mild pain Unknown at Unknown time  Neva Juarez MD   ORDER FOR DME Equipment being ordered: Electric Wheelchair for home use. Kelli Tripathi MD          Review of Systems:   A complete ROS was performed and is  negative other than what is stated in the HPI.       Physical Exam:   Blood pressure 112/57, resp. rate 15, SpO2 90 %, not currently breastfeeding. on 4 lpm  General: Alert, interactive, NAD, ill appearing  HEENT: AT/NC, sclera anicteric, PERRL, EOMI  Chest/Resp: diminished breath sounds throughout, using acessoary muscles, no wheezing, rales at the bases  Heart/CV: regular rate and rhythm, no murmur  Abdomen/GI: Soft, nontender, nondistended. +BS.  No rebound or guarding.  Extremities/MSK: No LE edema.  Right ankle with healing scabs  Skin: Warm and dry  Neuro: Alert & oriented x 3, no focal deficits, moves all extremities equally         Labs:   ROUTINE ICU LABS (Last four results)  CMP  Recent Labs  Lab 11/22/17  0911      POTASSIUM 4.5   CHLORIDE 104   CO2 26   ANIONGAP 11   *   BUN 49*   CR 1.30*   GFRESTIMATED 39*   GFRESTBLACK 47*   OLI 8.6     CBC  Recent Labs  Lab 11/22/17  0911   WBC 12.4*   RBC 3.98   HGB 11.5*   HCT 35.5   MCV 89   MCH 28.9   MCHC 32.4   RDW 14.1             Imaging/Procedures:     Results for orders placed or performed during the hospital encounter of 11/22/17   Chest  XR, 1 view PORTABLE    Narrative    CHEST ONE VIEW PORTABLE  11/22/2017 9:31 AM    HISTORY:  Dyspnea.    COMPARISON:  9/29/2009      Impression    IMPRESSION:  Limited by shallow inspiration and very large body  habitus. Right subclavian cardiac device. Sternal wires. Cardiomegaly.  Pulmonary vasculature at the lung bases is indistinct. Hazy opacities  of both lung bases could represent infiltrates, atelectasis or edema.  Cannot exclude pleural effusion at the right lung base. Calcified  aorta. The arch and proximal and descending aorta appear significantly  more prominent than on the prior exam. Patient has a known aortic  dissection. Recommend CT evaluation.   POC US ECHO LIMITED    Impression    [facility] Procedure Note      Limited Bedside ED Cardiac Ultrasound:    PROCEDURE: PERFORMED BY:  Dr. Linwood Price  INDICATIONS/SYMPTOM:  Shortness of Breath  PROBE: Cardiac phased array probe  BODY LOCATION: Chest  FINDINGS:   The ultrasound was performed utilizing the parasternal long axis and apical 4 chamber views.  Cardiac contractility:  Present  Gross estimation of cardiac kinesis: depressed  Pericardial Effusion:  None  RV:LV ratio: LV > RV    INTERPRETATION:    Technically difficult study.  No pericardial effusion.  Left ventricle is hypokinetic and suggestive of systolic dysfunction.  IMAGE DOCUMENTATION: Images were archived to PACs system.         Chest CT w/o contrast    Narrative    CT CHEST WITHOUT CONTRAST 11/22/2017 10:39 AM     HISTORY: Widened mediastinum on chest x-ray. History of aortic  dissection, renal insufficiency, active dyspnea.      COMPARISON: CT angiogram chest, abdomen and pelvis 9/13/2017. Chest  x-ray 11/22/2017.    TECHNIQUE: Axial images from the thoracic inlet to the lung bases are  performed with additional coronal reformatted images. No contrast is  utilized. Radiation dose for this scan was reduced using automated  exposure control, adjustment of the mA and/or kV according to patient  size, or iterative reconstruction technique.    FINDINGS:     Chest: There is near complete atelectasis of the right lower lobe and  infiltrate at the left lung base concerning for pneumonia. Acute  alveolitis remains in the differential. There is a small right and  trace left pleural effusion. No enlarged lymph nodes. Implantable  cardiac device demonstrates leads in the right atrium and right  ventricle. Heart size appears enlarged but is stable. Patient is  status post median sternotomy for prior aortic dissection. Aortic arch  measures approximately 4 cm in diameter on series 2, image 13 which is  stable. No increase in the size or caliber of the thoracic aorta.  Scattered calcified plaque is present. Dissection flap seen on prior  contrast-enhanced CT is barely evident on this  noncontrast study. No  mediastinal hemorrhage or fluid is otherwise evident. Limited images  upper abdomen demonstrate calcified gallstones. Gallbladder is  otherwise unremarkable where imaged. Bone window examination  demonstrates degenerative spine changes.      Impression    IMPRESSION:  1. Thoracic aorta remains dilated consistent with previous aortic  dissection but is stable since prior CT. No mediastinal hemorrhage or  fluid. No pericardial effusion. Heart is enlarged but stable.  2. New small right and trace left pleural effusion with right lower  lobe lung consolidation. Pneumonia is suspected. Additional infiltrate  is noted at the left lung base and inferior lingula. Small mediastinal  nodes are present but none are enlarged by CT criteria.    GLENIS SUERO MD

## 2017-11-22 NOTE — PROGRESS NOTES
SUBJECTIVE:   Kat Gomez is a 88 year old female, accompanied by son, who presents to clinic today for the following health issues:    SOB.     Patient awoke yesterday feeling sob, fatigued and weak. She is not on home oxygen. Symptoms gradually worsening.     She denies any cough, fevers, chills. No headache, lightheadedness or dizziness. No numbness, tingling.     No chest pain, torso pain, back pain or palpitations. No lower extremity edema.     No sick contacts. Lives with son.    History of CAD, a.fib--anticoagulated and thoracic aortic aneurysm. No history of CHF, COPD. Nonsmoker.     ROS:   C: NEGATIVE for fever, chills  E/M: NEGATIVE for ear, mouth and throat problems  R: POSITIVE for sob  CV: NEGATIVE for chest pain, palpitations or peripheral edema  GI: NEGATIVE for nausea, change in bowel habits  MUSCULOSKELETAL: NEGATIVE for significant arthralgias or myalgia  NEURO: NEGATIVE for weakness, dizziness or paresthesias    OBJECTIVE:                                                    /56 (BP Location: Right arm, Patient Position: Chair, Cuff Size: Adult Regular)  Pulse 60  Temp 97.1  F (36.2  C) (Tympanic)  Resp 30  Ht 5' (1.524 m)  Wt 170 lb (77.1 kg)  SpO2 92%  BMI 33.2 kg/m2 on 3L of oxygen  Body mass index is 33.2 kg/(m^2).   GENERAL: alert, respiratory distress  RESP: diminished breath sounds throughout; crackles in the lower lobes bilaterally  CV: regular rhythm and rate, normal S1 S2, no S3 or S4 and no murmur, no click or rub  LE: no edema on left; boot on right    Diagnostic test results:  Patient placed on 3 L oxygen to increase sats from 80% to 90-92%. Patient felt and looked more calm.   No results found for this or any previous visit (from the past 24 hour(s)).       ASSESSMENT/PLAN:                                                    (R06.03) Acute respiratory distress  (primary encounter diagnosis)  Comment: improved with supplemental oxygen. Referral to ED for further  evaluation. Paramedics to transfer patient to [facility]. Patient and son in agreement with plan.  Plan:     (R09.02) Hypoxia  Comment:   Plan:     See Patient Instructions    Consuelo Cardozo PA-C  [facility]

## 2017-11-22 NOTE — ED NOTES
"[facility]  ED Nurse Handoff Report    Kat Gomez is a 88 year old female   ED Chief complaint: Shortness of Breath  . ED Diagnosis:   Final diagnoses:   None     Allergies: No Known Allergies    Code Status: son Jim has power of   Activity level - Baseline/Home:  Independent. Activity Level - Current:   Stand with Assist of 2. Lift room needed: Yes. Bariatric: No   Needed: No   Isolation: No. Infection: Not Applicable.     Vital Signs:   Vitals:    11/22/17 0945 11/22/17 1000 11/22/17 1015 11/22/17 1045   BP: 116/75 115/69 102/59 116/61   Resp:  30  (!) 40   SpO2:  90%  91%       Cardiac Rhythm:  ,      Pain level:    Patient confused: No. Patient Falls Risk: Yes.   Elimination Status: Urethral catheter (arenas) in place; refer to orders to discontinue as per protocol per family request   Patient Report - Initial Complaint: shortness of breath X2 days. Focused Assessment: Patient's son states she has been short of breath since yesterday. \"can't even go to next room without panting\", denies fevers or aches/pains. Right ankle injury, wearing a boot for two weeks.  Tests Performed: labs, xray, ct, meds. Abnormal Results:   Labs Ordered and Resulted from Time of ED Arrival Up to the Time of Departure from the ED   CBC WITH PLATELETS - Abnormal; Notable for the following:        Result Value    WBC 12.4 (*)     Hemoglobin 11.5 (*)     All other components within normal limits   BASIC METABOLIC PANEL - Abnormal; Notable for the following:     Glucose 128 (*)     Urea Nitrogen 49 (*)     Creatinine 1.30 (*)     GFR Estimate 39 (*)     GFR Estimate If Black 47 (*)     All other components within normal limits   NT PROBNP INPATIENT - Abnormal; Notable for the following:     N-Terminal Pro BNP Inpatient 8316 (*)     All other components within normal limits   D DIMER QUANTITATIVE - Abnormal; Notable for the following:     D Dimer 3.1 (*)     All other components within normal limits "   ROUTINE UA WITH MICROSCOPIC - Abnormal; Notable for the following:     Blood Urine Small (*)     Mucous Urine Present (*)     Hyaline Casts 3 (*)     Amorphous Crystals Moderate (*)     All other components within normal limits   TROPONIN I   ISTAT CG4 GASES LACTATE CARLOS NURSING POCT   PERIPHERAL IV CATHETER   INDWELLING URINARY CATHETER (GONZALEZ)   STRICT INTAKE AND OUTPUT   ISTAT  GASES LACTATE CARLOS POCT     Chest CT w/o contrast   Preliminary Result   IMPRESSION:   1. Thoracic aorta remains dilated consistent with previous aortic   dissection but is stable since prior CT. No mediastinal hemorrhage or   fluid. No pericardial effusion. Heart is enlarged but stable.   2. New small right and trace left pleural effusion with right lower   lobe lung consolidation. Pneumonia is suspected. Additional infiltrate   is noted at the left lung base and inferior lingula. Small mediastinal   nodes are present but none are enlarged by CT criteria.      Chest  XR, 1 view PORTABLE   Preliminary Result   IMPRESSION:  Limited by shallow inspiration and very large body   habitus. Right subclavian cardiac device. Sternal wires. Cardiomegaly.   Pulmonary vasculature at the lung bases is indistinct. Hazy opacities   of both lung bases could represent infiltrates, atelectasis or edema.   Cannot exclude pleural effusion at the right lung base. Calcified   aorta. The arch and proximal and descending aorta appear significantly   more prominent than on the prior exam. Patient has a known aortic   dissection. Recommend CT evaluation.      POC US ECHO LIMITED   Final Result   [facility] Procedure Note        Limited Bedside ED Cardiac Ultrasound:      PROCEDURE: PERFORMED BY: Dr. Linwood Price   INDICATIONS/SYMPTOM:  Shortness of Breath   PROBE: Cardiac phased array probe   BODY LOCATION: Chest   FINDINGS:    The ultrasound was performed utilizing the parasternal long axis and apical 4 chamber views.   Cardiac contractility:   Present   Gross estimation of cardiac kinesis: depressed   Pericardial Effusion:  None   RV:LV ratio: LV > RV      INTERPRETATION:    Technically difficult study.  No pericardial effusion.  Left ventricle is hypokinetic and suggestive of systolic dysfunction.   IMAGE DOCUMENTATION: Images were archived to PACs system.                 Treatments provided: oxygen, meds, arenas, monitor, strict I and O  Family Comments: Jim(son) and daughter present  OBS brochure/video discussed/provided to patient:  N/A  ED Medications:   Medications   furosemide (LASIX) injection 40 mg (40 mg Intravenous Given 11/22/17 1022)     Drips infusing:  No  For the majority of the shift, the patient's behavior Green. Interventions performed were none.     Severe Sepsis OR Septic Shock Diagnosis Present: No      ED Nurse Name/Phone Number: Jazz Angeserica,   10:57 AM    RECEIVING UNIT ED HANDOFF REVIEW    Above ED Nurse Handoff Report was reviewed: Yes  Reviewed by: Caroline Yepez on November 22, 2017 at 12:02 PM

## 2017-11-22 NOTE — PLAN OF CARE
Problem: Patient Care Overview  Goal: Plan of Care/Patient Progress Review  Outcome: No Change  3816 Text page to MD: Family requesting swallow study, intermittent coughing with thin liquids at home.  Please advise. THank you     Pt received from ED for pneumonia and hypoxia.  MD notified that pt was requiring increased O2 from when she came up from the ED, placed on oxymask at 10LPM, sats 88-90%.  Pt transferred to ICU.  Labored breathing, but pt continues to deny SOB.  LS-dim, coarse bases.  Loyd in place, IV lasix given in ED.  Tele- occ Vpaced

## 2017-11-22 NOTE — PHARMACY-ADMISSION MEDICATION HISTORY
Admission medication history interview status for this patient is complete. See [facility] admission navigator for allergy information, prior to admission medications and immunization status.     Medication history interview source(s):Patient and Family  Medication history resources (including written lists, pill bottles, clinic record):clinic records  Primary pharmacy:[facility]    Changes made to PTA medication list:  Added: Cranberry, pomegranate, Calcium, vit d, mag  Deleted: none  Changed: none    Actions taken by pharmacist (provider contacted, etc):None     Additional medication history information:None    Medication reconciliation/reorder completed by provider prior to medication history? No    For patients on insulin therapy: No        Prior to Admission medications    Medication Sig Last Dose Taking? Auth Provider   hydrochlorothiazide 12.5 MG TABS tablet Take 1 tablet (12.5 mg) by mouth daily 11/22/2017 at Unknown time Yes Kelli Del Toro MD   trospium (SANCTURA XR) 60 MG CP24 24 hr capsule Take 1 capsule (60 mg) by mouth every morning 11/22/2017 at Unknown time Yes Kelli Del Toro MD   NIFEdipine ER osmotic (PROCARDIA XL) 60 MG TB24 TAKE 1 TABLET DAILY 11/22/2017 at Unknown time Yes Kelli Del Toro MD   simvastatin (ZOCOR) 40 MG tablet TAKE 1 TABLET AT BEDTIME 11/21/2017 at Unknown time Yes Kelli Del Toro MD   apixaban ANTICOAGULANT (ELIQUIS) 2.5 MG tablet Take 1 tablet (2.5 mg) by mouth 2 times daily 11/22/2017 at Unknown time Yes Lucius Jiménez MD   metoprolol (LOPRESSOR) 25 MG tablet Take 1 tablet (25 mg) by mouth 2 times daily 11/22/2017 at x1 Yes Lucius Jiménez MD   amantadine HCl 100 MG TABS One pill in am 11/22/2017 at Unknown time Yes Kelli Del Toro MD   gabapentin (NEURONTIN) 300 MG capsule Take 1 capsule (300 mg) by mouth At Bedtime 11/21/2017 at Unknown time Yes Kelli Del Toro MD   Cranberry 125 MG TABS  Unknown   Unknown, Entered By History   calcium carbonate-vitamin D 600-400 MG-UNIT CHEW  Unknown at Unknown time  Unknown, Entered By History   Pomegranate, [place] gran[place], (POMEGRANATE PO)  Unknown at Unknown time  Unknown, Entered By History   Magnesium Oxide (MAGOX 400 PO)  Unknown at Unknown time  Unknown, Entered By History   VITAMIN D, CHOLECALCIFEROL, PO Take 2,000 Units by mouth daily Unknown at Unknown time  Unknown, Entered By History   order for DME Wear boot Jacinto Oviedo MD   ASPIRIN NOT PRESCRIBED (INTENTIONAL) Please choose reason not prescribed, below Kelli Tripathi MD   clobetasol (TEMOVATE) 0.05 % cream Apply sparingly to affected area once daily as needed.  Do not apply to face. Unknown at Unknown time  Kelli Del Toro MD   Nystatin (NYSTOP) 043109 UNIT/GM POWD Apply to affected area 2x daily Unknown at Unknown time  Kelli Del Toro MD   acetaminophen (TYLENOL) 325 MG tablet Take 2 tablets (650 mg) by mouth every 4 hours as needed for mild pain Unknown at Unknown time  Neva Juarez MD   ORDER FOR DME Equipment being ordered: Electric Wheelchair for home use. Kelli Tripathi MD

## 2017-11-22 NOTE — MR AVS SNAPSHOT
After Visit Summary   11/22/2017    Kat Gomez    MRN: [id number]           Patient Information     Date Of Birth          12/1/1928        Visit Information        Provider Department      11/22/2017 8:10 AM Consuelo Cardozo PA-C [facility]        Today's Diagnoses     Acute respiratory distress    -  1    Hypoxia           Follow-ups after your visit        Your next 10 appointments already scheduled     Dec 06, 2017 11:30 AM CST   Return Visit with Asad Peter DPM   [facility] ([facility])    [address] [place] [address]  [address]  [facility] [address] [phone]            Dec 27, 2017 10:15 AM CST   Return Visit with Lucius Jiménez MD   [facility] ([facility])    [address]  [facility] [id number] [phone]            Jan 09, 2018 11:45 AM CST   Remote PPM Check with BEDOLLA TECH1   [facility] ([facility])    [address]  [facility] [id number] [phone]           This appointment is for a remote check of your pacemaker.  This is not an appointment at the office.            Apr 04, 2018  9:00 AM CDT   Office Visit with Kelli Del Toro MD   [facility] ([facility])    [address] [place] [address]  [address]  [facility] [address] [phone]           Bring a current list of meds and any records pertaining to this visit. For Physicals, please bring immunization records and any forms needing to be filled out. Please arrive 10 minutes early to complete paperwork.              Who to contact     If you have questions or need follow up information about today's clinic visit or your schedule please contact [facility] directly at [phone].  Normal or non-critical lab and imaging results will be communicated to you by MyChart, letter or phone  within 4 business days after the clinic has received the results. If you do not hear from us within 7 days, please contact the clinic through YellowSchedule or phone. If you have a critical or abnormal lab result, we will notify you by phone as soon as possible.  Submit refill requests through YellowSchedule or call your pharmacy and they will forward the refill request to us. Please allow 3 business days for your refill to be completed.          Additional Information About Your Visit        [email or web address] Information     YellowSchedule gives you secure access to your electronic health record. If you see a primary care provider, you can also send messages to your care team and make appointments. If you have questions, please call your primary care clinic.  If you do not have a primary care provider, please call [phone] and they will assist you.        Care EveryWhere ID     This is your Care EveryWhere ID. This could be used by other organizations to access your [facility] medical records  [id number]        Your Vitals Were     Pulse Temperature Respirations Height Pulse Oximetry BMI (Body Mass Index)    60 97.1  F (36.2  C) (Tympanic) 30 5' (1.524 m) 92% 33.2 kg/m2       Blood Pressure from Last 3 Encounters:   11/22/17 118/65   11/22/17 100/56   11/06/17 116/70    Weight from Last 3 Encounters:   11/22/17 170 lb (77.1 kg)   11/08/17 170 lb (77.1 kg)   11/01/17 170 lb 14.4 oz (77.5 kg)              Today, you had the following     No orders found for display       Primary Care Provider Office Phone # Fax #    Kelli Del Toro [phone] [address] [place] [address]        Equal Access to Services     [facility]: Hadii aad cathie hadasho Sokam, waaxda luqadaha, qaybta kaalmada adesiddharthyada, cliff montanez. So [facility] [phone].    ATENCIÓN: Si habla español, tiene a watts disposición servicios gratuitos de asistencia lingüística. Llame al [phone].    We comply  with applicable federal civil rights laws and [place] laws. We do not discriminate on the basis of race, color, national origin, age, disability, sex, sexual orientation, or gender identity.            Thank you!     Thank you for choosing [facility]  for your care. Our goal is always to provide you with excellent care. Hearing back from our patients is one way we can continue to improve our services. Please take a few minutes to complete the written survey that you may receive in the mail after your visit with us. Thank you!             Your Updated Medication List - Protect others around you: Learn how to safely use, store and throw away your medicines at www.disposemymeds.org.          This list is accurate as of: 11/22/17 11:23 AM.  Always use your most recent med list.                   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  Do not apply to face.    Vaginal itching       Cranberry 125 MG Tabs           gabapentin 300 MG capsule    NEURONTIN    90 capsule    Take 1 capsule (300 mg) by mouth At Bedtime    Peripheral neuropathy, idiopathic       hydrochlorothiazide 12.5 MG Tabs tablet     90 tablet    Take 1 tablet  (12.5 mg) by mouth daily    Benign essential hypertension       MAGOX 400 PO           metoprolol 25 MG tablet    LOPRESSOR    180 tablet    Take 1 tablet (25 mg) by mouth 2 times daily    Essential hypertension, benign       NIFEdipine ER osmotic 60 MG Tb24    PROCARDIA XL    90 tablet    TAKE 1 TABLET DAILY    Essential hypertension, benign       NYSTOP 093190 UNIT/GM Powd   Generic drug:  nystatin     15 g    Apply to affected area 2x daily    Yeast infection of the skin       * order for DME     1 Units    Equipment being ordered: Electric Wheelchair for home use.    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)       * order for DME     1 Act    Wear boot    Closed displaced fracture of medial malleolus of right tibia, initial encounter       POMEGRANATE PO           simvastatin 40 MG tablet    ZOCOR    90 tablet    TAKE 1 TABLET AT BEDTIME    Hyperlipidemia LDL goal <100       trospium 60 MG Cp24 24 hr capsule    SANCTURA XR    90 each    Take 1 capsule (60 mg) by mouth every morning    Urinary, incontinence, stress female       VITAMIN D (CHOLECALCIFEROL) PO      Take 2,000 Units by mouth daily        * Notice:  This list has 2 medication(s) that are the same as other medications prescribed for you. Read the directions carefully, and ask your doctor or other care provider to review them with you.

## 2017-11-22 NOTE — LETTER
Key Recommendations:  Admitted with PNA and CHF exacerbation.  Recent Ankle Fx-has cam boot.      Wendi Martin    AVS/Discharge Summary is the source of truth; this is a helpful guide for improved communication of patient story

## 2017-11-22 NOTE — PROGRESS NOTES
Pt was placed on the BiPAP (12/5 @ 60%) and currently remains on it due to her respiratory needs and distress. Will continue to monitor and assess in hopes of liberating her from the BiPAP.    Vital signs:  Temp: 97.7  F (36.5  C) Temp src: Axillary BP: 111/45 Pulse: 61 Heart Rate: 62 Resp: 25 SpO2: 92 % O2 Device: BiPAP/CPAP Oxygen Delivery: 10 LPM Height: 152.4 cm (5') Weight: 81.6 kg (180 lb)  Estimated body mass index is 35.15 kg/(m^2) as calculated from the following:    Height as of this encounter: 1.524 m (5').    Weight as of this encounter: 81.6 kg (180 lb).    PAST MEDICAL HISTORY:   Past Medical History:   Diagnosis Date     Adjustment disorder with depressed mood 9/25/2013     Aortic valve disorders     AVR 2003     Ascending aortic dissection (H)     s/p repair and resuspension of her native AV     Complete heart block (H) 7/19/2016     Coronary artery disease      Hyperlipidaemia      Hypertension      LISSET (obstructive sleep apnea)      Paroxysmal supraventricular tachycardia (H)      Strabismus      Syncope        PAST SURGICAL HISTORY:   Past Surgical History:   Procedure Laterality Date     CATARACT IOL, RT/LT       CORONARY ANGIOGRAPHY ADULT ORDER      2003     CORONARY ARTERY BYPASS      SVG to LAD and SVG to RCA     CYSTOCELE REPAIR       ENDOVASCULAR REPAIR, INFRARENAL ABDOMINAL AORTIC ANEURYSM/DISSECTION; MODULAR BIFURCATED PROSTHESIS      Emergent repair of aorta and resuspension of aortic valve with two vessel bypass in 2003     FRACTURE TX, HIP RT/LT      Fracture TX Hip RT/LT     IMPLANT PACEMAKER  7/2016    dual chamber pacemaker via right subclavian vein without complication     REPLACE VALVE AORTIC      Bioprosthetic valve in 2003       FAMILY HISTORY:   Family History   Problem Relation Age of Onset     CEREBROVASCULAR DISEASE Mother      Coronary Artery Disease Mother      Hyperlipidemia Mother      Prostate Cancer Father      Hyperlipidemia Father      DIABETES Sister      Neurologic  Disorder Sister      cerebellar ataxia     GASTROINTESTINAL DISEASE Sister      autoimmune hepatitis     Coronary Artery Disease Son      Breast Cancer Son      Colon Cancer Son      DIABETES Daughter      Breast Cancer Daughter        SOCIAL HISTORY:   Social History   Substance Use Topics     Smoking status: Never Smoker     Smokeless tobacco: Never Used     Alcohol use No      Comment: glass of wine once a week     Asad Granado RT  11/22/2017

## 2017-11-22 NOTE — NURSING NOTE
Chief Complaint   Patient presents with     URI       Initial /56 (BP Location: Right arm, Patient Position: Chair, Cuff Size: Adult Regular)  Pulse 63  Temp 97.1  F (36.2  C) (Tympanic)  Resp 30  Ht 5' (1.524 m)  Wt 170 lb (77.1 kg)  SpO2 (!) 80%  BMI 33.2 kg/m2 Estimated body mass index is 33.2 kg/(m^2) as calculated from the following:    Height as of this encounter: 5' (1.524 m).    Weight as of this encounter: 170 lb (77.1 kg).  Medication Reconciliation: complete   Quin Cardenas MA

## 2017-11-22 NOTE — ED PROVIDER NOTES
History     Chief Complaint:  Shortness of breath    HPI   Kat Gomez is a 88 year old female with a history of coronary artery disease  taking Eliquis who presents with shortness of breath. The patient reports that she was feeling generally lethargic add fatigued over the last 2-3 days which caused her to go to the [facility] [place] in [place] for evaluation. She reports that she had been feeling short of breath with increased coughing for the past few days which has caused her to have difficulty sleeping. At the clinic she was found to have an oxygen saturation in the low 80's on room air. She was then brought to the emergency department via EMS. She was placed on 4 liters of oxygen and her saturation was 94%. She denies having experienced any chest pain, heart racing, new leg swelling, or fevers. Of note the patient does have a history of aortic and thoracic aneurysms. She denies having any history of COPD.     Allergies:  No Known Drug Allergies      Medications:    Hydrochlorothiazide  Sanctura  Procardia   Simvastatin  Eliquis  Metoprolol  Amantadine  Gabapentin    Past Medical History:    Adjustment disorder with depressed mood  Aortic valve disorders  Ascending aortic dissection  Complete heart block  Coronary artery disease  Hyperlipidemia  Hypertension  LISSET  Paroxysmal supraventricular tachycardia  Strabismus  Syncope    Past Surgical History:    Cataract IOL bilateral  Coronary angiography  Coronary artery bypass  Cystocele repair  Endovascular repair, infrarenal abdominal aortic aneurysm/dissection  Fracture repair hip bilateral  Implant pacemaker  Replace valve aortic    Family History:    Cerebrovascular disease  CAD  Hyperlipidemia  Diabetes  Cerebellar ataxia  Autoimmune hepatitis  Cancer    Social History:  The patient was accompanied to the ED by her son.  Smoking Status: No  Smokeless Tobacco: No  Alcohol Use: Yes   Marital Status:   [2]    Review of Systems   Respiratory: Positive  for cough and shortness of breath.    Cardiovascular: Negative for chest pain, palpitations and leg swelling.   All other systems reviewed and are negative.    Physical Exam   Vitals:  Patient Vitals for the past 24 hrs:   BP Heart Rate Resp SpO2   11/22/17 1100 118/65 61 19 90 %   11/22/17 1045 116/61 62 (!) 40 91 %   11/22/17 1015 102/59 - - -   11/22/17 1000 115/69 - 30 90 %   11/22/17 0945 116/75 - - -   11/22/17 0930 114/54 65 (!) 33 93 %   11/22/17 0915 114/72 66 (!) 32 92 %   11/22/17 0903 100/60 90 - 90 %      Physical Exam    General:   Pleasant, age appropriate mildly ill appearing female.  HEENT:    Oropharynx is moist, without lesions or trismus.  Eyes:    Conjunctiva normal  Neck:    Supple, no meningismus.     CV:     Regular rate and rhythm.      Heart sounds distant; No murmurs, rubs or gallops.       No unilateral leg swelling.       2+ radial pulses bilateral.       Trace bilateral lower extremity edema.  PULM:    Inspiratory rales at the bases.     Diminished breath sounds throughout.     Tachypneic although no distress.      Good air exchange.     No rales or wheezing.     No stridor.  ABD:    Soft, non-tender, non-distended.       No pulsatile masses.       No rebound, guarding or rigidity.  MSK:     No gross deformity to all four extremities.   LYMPH:   No cervical lymphadenopathy.  NEURO:   Alert. Good muscle tone, no atrophy.  Skin:    Warm, dry and intact.    Psych:    Mood is good and affect is appropriate.    Emergency Department Course     ECG:  ECG taken at 0905, ECG read at 0906  AV dual-paced rhythm. Abnormal ECG  Rate 81bpm. CA interval *. QRS duration 180. QT/QTc 484/562. P-R-T axes *, 1, 166.     Imaging:  Radiology findings were communicated with the patient who voiced understanding of the findings.  Chest XR, 1 view portable:  IMPRESSION:  Limited by shallow inspiration and very large body  habitus. Right subclavian cardiac device. Sternal wires. Cardiomegaly.  Pulmonary  vasculature at the lung bases is indistinct. Hazy opacities  of both lung bases could represent infiltrates, atelectasis or edema.  Cannot exclude pleural effusion at the right lung base. Calcified  aorta. The arch and proximal and descending aorta appear significantly  more prominent than on the prior exam. Patient has a known aortic  dissection. Recommend CT evaluation.  Reading per radiology.     Laboratory:  Laboratory findings were communicated with the patient who voiced understanding of the findings.  ISTAT gases lactate venous(0916): pH: 7.38, PCO2: 43, PO2: 34, Bicarbonate: 26, O2 sat: 64, Lactic acid: 1.5  CBC: WBC: 12.4(H), HGB: 11.5(L), o/w WNL ()  BMP: Glucose: 128(H), BUN: 49(H), Creatinine: 1.30(H), GFR: 39(L), o/w WNL    BNP: 8319(H)  Troponin (Collected 0911): <0.015   D Dimer (Collected 0911): 3.1(H)     Procedures:     Limited Bedside ED Cardiac Ultrasound:    PROCEDURE: PERFORMED BY: Dr. Linwood Priec  INDICATIONS/SYMPTOM:  Shortness of Breath  PROBE: Cardiac phased array probe  BODY LOCATION: Chest  FINDINGS:   The ultrasound was performed utilizing the parasternal long axis and apical 4 chamber views.  Cardiac contractility:  Present  Gross estimation of cardiac kinesis: depressed  Pericardial Effusion:  None  RV:LV ratio: LV > RV    INTERPRETATION:    Technically difficult study.  No pericardial effusion.  Left ventricle is hypokinetic and suggestive of systolic dysfunction.    Interventions:  1022 Lasix 40 mg IV  1139 Ceftriaxone 2 g IV  1139 Azithromycin 500 mg IV  Medications   cefTRIAXone (ROCEPHIN) 2 g vial to attach to  ml bag for ADULTS or NS 50 ml bag for PEDS (2 g Intravenous New Bag 11/22/17 1139)   azithromycin (ZITHROMAX) 500 mg in D5W 250 mL intermittent infusion (500 mg Intravenous New Bag 11/22/17 1139)   furosemide (LASIX) injection 40 mg (40 mg Intravenous Given 11/22/17 1022)        Emergency Department Course:  Nursing notes and vitals reviewed.  I  performed an exam of the patient as documented above.   IV was inserted and blood was drawn for laboratory testing, results above.  The patient was sent for a XR while in the emergency department, results above.      1122 I spoke with Yamila GONSALEZ regarding the patient    I discussed the treatment plan with the patient. They expressed understanding of this plan and consented to admission. I discussed the patient with Yamila GONSALEZ, who will admit the patient to a monitored bed for further evaluation and treatment.     I personally reviewed the laboratory results with the patient and answered all related questions prior to admission.    Impression & Plan      Medical Decision Making:  Kat Gomez is a 88 year old female who presents to the emergency department today with three day history of fatigue and dyspnea. She was hypoxic at the clinic at 80% and is now stable on 4 liters. She was not distressed, although she was tachypnic. On initial evaluation, she had insipitory rales. Chest x ray was most concerning for heart failure. BNP was elevated at 8000 and bed side ultrasound revealed a systolic function that was depressed. She was treated for presumed acute systolic heart failure with lasix. There is no evidence of cardiac ischemia based on ECG and troponin.     On chest x ray there was a widened mediastinum though she has a history of aortic dissection. She underwent CT scan of the chest, unfortunately without contrast given a GFR of 39. Aortic dissection appears unchanged, but was found to have bilateral pleural effusions and initial findings more suggestive of pneumonia as opposed to heart failure. At that point, the patient was given antibiotics including ceftriaxone and azithromycin. Lactic acid was within normal limits.     She did have an elevated D Dimer which was initially ordered when the etiology of her hypoxia was uncertain. She is on Eliquis making pulmonary embolism much less likely.  Unfortunately she could not undergo CT with contrast due to her renal insuffiencey today. D Dimer may be elevated due to her chronic aortic dissection. Low suspicion for pulmonary embolism with an easily identifiable reason for the hypoxia (pneumonia and pleural effusion) thus I did not feel that further investigation into PE was warranted.     Diagnosis:    ICD-10-CM    1. Community acquired pneumonia, unspecified laterality J18.9 Blood culture     Blood culture   2. Renal insufficiency N28.9       Disposition:   Admitted    CMS Diagnoses: None     Scribe Disclosure:  Raul FOURNIER, am serving as a scribe at 9:01 AM on 11/22/2017 to document services personally performed by Linwood Price MD, based on my observations and the provider's statements to me.   [facility] EMERGENCY DEPARTMENT       Linwood Price MD  11/22/17 7920

## 2017-11-22 NOTE — ED NOTES
Shortness of breath X2 days, per ems 94% on 4L. Patient was initially sats 80s, Wearing ortho boot on right leg.

## 2017-11-22 NOTE — ED NOTES
"Patient's son states she has been short of breath since yesterday. \"can't even go to next room without panting\", denies fevers or aches/pains. Right ankle injury, wearing a boot for two weeks.  "

## 2017-11-22 NOTE — PROGRESS NOTES
[employer] pacemaker interrogated.  Report summery results were called to the unit.  Findings: Battery and leads WNL  Atrial sensing, ventricular pacing: rate of 63 beats per min.  Last checked 9/29/2017  Atrial tach and Atrial fibrillation episodes noted but less than 15 minute episodes. Most recent was 11/22/17.  Report to be faxed to ICU.

## 2017-11-22 NOTE — PROGRESS NOTES
Now moved to ICU and on BiPAP therapy.    Family relays possible history of aspiration.    -- Speech path consult.  -- Change IV Ceftriaxone to IV Zosyn.      In total I have spent 40 minutes of critical care time with patient and directly working on patient care so far today.    Addendum:  Echo shows areas of hypokinesia.    Check one more troponin level.  Cardiology consult.  Already on Apixaban, Zocor, Metoprolol.

## 2017-11-23 NOTE — PROGRESS NOTES
Pt was on the BiPAP for most of the morning and then was transitioned to a 10 LPM Oxynask which she is still on and is tolerating it very well. Will continue to monitor and assess.    Vital signs:  Temp: 97.6  F (36.4  C) Temp src: Oral BP: 122/62   Heart Rate: 67 Resp: (!) 32 SpO2: 98 % O2 Device: BiPAP/CPAP Oxygen Delivery: 10 LPM Height: 152.4 cm (5') Weight: 79.6 kg (175 lb 7.8 oz)  Estimated body mass index is 34.27 kg/(m^2) as calculated from the following:    Height as of this encounter: 1.524 m (5').    Weight as of this encounter: 79.6 kg (175 lb 7.8 oz).    PAST MEDICAL HISTORY:   Past Medical History:   Diagnosis Date     Adjustment disorder with depressed mood 9/25/2013     Aortic valve disorders     AVR 2003     Ascending aortic dissection (H)     s/p repair and resuspension of her native AV     Complete heart block (H) 7/19/2016     Coronary artery disease      Hyperlipidaemia      Hypertension      LISSET (obstructive sleep apnea)      Paroxysmal supraventricular tachycardia (H)      Strabismus      Syncope        PAST SURGICAL HISTORY:   Past Surgical History:   Procedure Laterality Date     CATARACT IOL, RT/LT       CORONARY ANGIOGRAPHY ADULT ORDER      2003     CORONARY ARTERY BYPASS      SVG to LAD and SVG to RCA     CYSTOCELE REPAIR       ENDOVASCULAR REPAIR, INFRARENAL ABDOMINAL AORTIC ANEURYSM/DISSECTION; MODULAR BIFURCATED PROSTHESIS      Emergent repair of aorta and resuspension of aortic valve with two vessel bypass in 2003     FRACTURE TX, HIP RT/LT      Fracture TX Hip RT/LT     IMPLANT PACEMAKER  7/2016    dual chamber pacemaker via right subclavian vein without complication     REPLACE VALVE AORTIC      Bioprosthetic valve in 2003       FAMILY HISTORY:   Family History   Problem Relation Age of Onset     CEREBROVASCULAR DISEASE Mother      Coronary Artery Disease Mother      Hyperlipidemia Mother      Prostate Cancer Father      Hyperlipidemia Father      DIABETES Sister      Neurologic  Disorder Sister      cerebellar ataxia     GASTROINTESTINAL DISEASE Sister      autoimmune hepatitis     Coronary Artery Disease Son      Breast Cancer Son      Colon Cancer Son      DIABETES Daughter      Breast Cancer Daughter        SOCIAL HISTORY:   Social History   Substance Use Topics     Smoking status: Never Smoker     Smokeless tobacco: Never Used     Alcohol use No      Comment: glass of wine once a week     Asad Granado RT  11/23/2017

## 2017-11-23 NOTE — PLAN OF CARE
Problem: Patient Care Overview  Goal: Plan of Care/Patient Progress Review  Outcome: No Change  .ICU End of Shift Summary.  For vital signs and complete assessments, please see documentation flowsheets.     Pertinent assessments: VSS.  Remains on Bipap 60% throughout the night.  Patient desats easily when taking off Bipap to do oral cares.  LS coarse.  Denies pain.  NS@ 50 ml/hr.  IV Zosyn. Loyd patent with good urine output.  Patient remains NPO awaiting for speech eval.  Tolerated oral meds with sips of water.    Major Shift Events: Bipap throughout the night.    Plan (Upcoming Events): cardiology to see; speech to see for swallow eval.    Discharge/Transfer Needs: TBD    Bedside Shift Report Completed :   Bedside Safety Check Completed:

## 2017-11-23 NOTE — PROGRESS NOTES
"SPIRITUAL HEALTH SERVICES Progress Note  [facility] ICU    I introduced myself to Kat.  She invited me in and asked me to pull up a chair.  Kat stated that her son Jim lives with her and he was the one that had taken her to the clinic because she was weak and having trouble breathing.  She stated from there, they sent her to the hospital.  Kat stated, \"That's life.\"      Kat shared with me that she has 5 children, one of who lives in [place].  She stated that they are all good kids and that she has had a good life.    Kat stated that she thinks she will be at the hospital through the weekend and will hopefully get out on Sunday.    [facility] will continue to be available per Pt/Family/Staff request.      Stefanie Reeder Intern  [phone]  "

## 2017-11-23 NOTE — PROVIDER NOTIFICATION
Notified Dr. Erickson regarding OG order. Pt on Bipap, NPO except meds, swallow eval ordered.  Dr. Erickson plan to hold off on OG placement until after swallow eval tomorrow.

## 2017-11-23 NOTE — CONSULTS
[facility]    CARDIOLOGY CONSULT    Date of Admission:  11/22/2017  Date of Consult: November 23, 2017    ASSESSMENT:  88-year-old female with remote bypass and pacemaker seen for possible CHF exacerbation.  She probably has a mild diastolic congestive heart failure exacerbation, provoked by her pneumonia.  Her weight is 180 pounds, which is at her baseline.  She has no lower extremity edema.  The smaller pleural effusions could be cardiac related, however they also could be parapneumonic.    Echocardiogram was reviewed.  Her image quality is very limited, even with contrast imaging.  There was comment of an inferior wall motion abnormality being new, however when compared to previous, there is probably not a significant difference.  Her serial troponins and negative, would not pursue further workup for ischemia at this point.    She's been started on antibiotics.  She probably needs some gentle diuresis as well.  Could consider right thoracentesis in the future if breathing is not improving as expected.    RECOMMENDATIONS:  1.  Coronary artery disease, status post remote bypass  - Stable, no further cardiac workup at this point, continue medications    2.  Mild acute diastolic congestive heart failure exacerbation  - Lasix 20 mg IV b.i.d. today, reassess tomorrow    3.  Paroxysmal atrial fibrillation  - In sinus rhythm on telemetry, continue Eliquis    4.  Sick sinus syndrome with pacemaker  - Normal pacemaker function on EKG    5.  Status post aortic dissection repair  - Stable findings on echo     6.  Pneumonia  - Antibiotics per medicine    Please call with further questions.    Kiran Rodriguez MD  Cardiology - [facility]  Pager:  [phone]  Text Page  November 23, 2017    CODE STATUS:  Full Code    REASON FOR CONSULT: Wall motion abnormality on echo    PRIMARY CARE PHYSICIAN:  Kelli Del Toro    HISTORY OF PRESENT ILLNESS:  88 year old female with a history of CABG, sick sinus  syndrome, and aortic dissection is seen for wall motion abnormality on echo.  She has a history of aortic dissection with repair and resuspension of her native aortic valve.  She also has sick sinus syndrome requiring pacemaker implantation.  In 2003 she underwent 2 vessel CABG with vein grafts to the LAD and RCA.    Nuclear stress in 2012 showed anterior and apical nontransmural infarct with mild inocente-infarct ischemia and basal lateral nontransmural infarct with mild inocente-infarct ischemia, EF 61%.    The baseline she does some light activity with no shortness breath or chest pain.  She states her weight has been stable recently.    She now presents with one week of progressive dyspnea.  She has had generalized fatigue.  She denies any chest pain, new lower extremity edema, or weight gain.  She felt like her heart was racing the last day or 2 before admission.      Upon ED presentation her O2 sats were 81% on room air.  She had a mild sinus tachycardia.  She was found to have a pneumonia on chest imaging and started on antibiotics.  She received a dose of Lasix for possible mild CHF and elevated BNP.    She has remained on BiPAP overnight.  She is feeling a little better today.  She has a decent urine output.  Heart rate and blood pressure have remained stable.    PAST MEDICAL HISTORY:  I have reviewed this patient's medical history and updated it with pertinent information if needed.   Past Medical History:   Diagnosis Date     Adjustment disorder with depressed mood 9/25/2013     Aortic valve disorders     AVR 2003     Ascending aortic dissection (H)     s/p repair and resuspension of her native AV     Complete heart block (H) 7/19/2016     Coronary artery disease      Hyperlipidaemia      Hypertension      LISSET (obstructive sleep apnea)      Paroxysmal supraventricular tachycardia (H)      Strabismus      Syncope          PAST SURGICAL HISTORY:  I have reviewed this patient's surgical history and updated it with  pertinent information if needed.  Past Surgical History:   Procedure Laterality Date     CATARACT IOL, RT/LT       CORONARY ANGIOGRAPHY ADULT ORDER      2003     CORONARY ARTERY BYPASS      SVG to LAD and SVG to RCA     CYSTOCELE REPAIR       ENDOVASCULAR REPAIR, INFRARENAL ABDOMINAL AORTIC ANEURYSM/DISSECTION; MODULAR BIFURCATED PROSTHESIS      Emergent repair of aorta and resuspension of aortic valve with two vessel bypass in 2003     FRACTURE TX, HIP RT/LT      Fracture TX Hip RT/LT     IMPLANT PACEMAKER  7/2016    dual chamber pacemaker via right subclavian vein without complication     REPLACE VALVE AORTIC      Bioprosthetic valve in 2003         HOME MEDICATIONS:Prior to Admission Medications   Prescriptions Last Dose Informant Patient Reported? Taking?   ASPIRIN NOT PRESCRIBED (INTENTIONAL) na  Yes No   Sig: Please choose reason not prescribed, below   Cranberry 125 MG TABS Unknown  Yes No   Magnesium Oxide (MAGOX 400 PO) Unknown at Unknown time  Yes No   NIFEdipine ER osmotic (PROCARDIA XL) 60 MG TB24 11/22/2017 at Unknown time  No Yes   Sig: TAKE 1 TABLET DAILY   Nystatin (NYSTOP) 586878 UNIT/GM POWD Unknown at Unknown time  No No   Sig: Apply to affected area 2x daily   ORDER FOR DME na  No No   Sig: Equipment being ordered: Electric Wheelchair for home use.   Pomegranate, [place] granatum, (POMEGRANATE PO) Unknown at Unknown time  Yes No   VITAMIN D, CHOLECALCIFEROL, PO Unknown at Unknown time  Yes No   Sig: Take 2,000 Units by mouth daily   acetaminophen (TYLENOL) 325 MG tablet Unknown at Unknown time  No No   Sig: Take 2 tablets (650 mg) by mouth every 4 hours as needed for mild pain   amantadine HCl 100 MG TABS 11/22/2017 at Unknown time  No Yes   Sig: One pill in am   apixaban ANTICOAGULANT (ELIQUIS) 2.5 MG tablet 11/22/2017 at Unknown time  No Yes   Sig: Take 1 tablet (2.5 mg) by mouth 2 times daily   calcium carbonate-vitamin D 600-400 MG-UNIT CHEW Unknown at Unknown time  Yes No   clobetasol  (TEMOVATE) 0.05 % cream Unknown at Unknown time  No No   Sig: Apply sparingly to affected area once daily as needed.  Do not apply to face.   gabapentin (NEURONTIN) 300 MG capsule 11/21/2017 at Unknown time  No Yes   Sig: Take 1 capsule (300 mg) by mouth At Bedtime   hydrochlorothiazide 12.5 MG TABS tablet 11/22/2017 at Unknown time  No Yes   Sig: Take 1 tablet (12.5 mg) by mouth daily   metoprolol (LOPRESSOR) 25 MG tablet 11/22/2017 at x1  No Yes   Sig: Take 1 tablet (25 mg) by mouth 2 times daily   order for DME na  No No   Sig: Wear boot   simvastatin (ZOCOR) 40 MG tablet 11/21/2017 at Unknown time  No Yes   Sig: TAKE 1 TABLET AT BEDTIME   trospium (SANCTURA XR) 60 MG CP24 24 hr capsule 11/22/2017 at Unknown time  No Yes   Sig: Take 1 capsule (60 mg) by mouth every morning      Facility-Administered Medications: None       ALLERGIES:  No Known Allergies    SOCIAL HISTORY:  I have reviewed this patient's social history and updated it with pertinent information if needed. Kat PATEL Barrerarenaemoristori  reports that she has never smoked. She has never used smokeless tobacco. She reports that she does not drink alcohol or use illicit drugs.    FAMILY HISTORY:  I have reviewed this patient's family history and updated it with pertinent information if needed.   Family History   Problem Relation Age of Onset     CEREBROVASCULAR DISEASE Mother      Coronary Artery Disease Mother      Hyperlipidemia Mother      Prostate Cancer Father      Hyperlipidemia Father      DIABETES Sister      Neurologic Disorder Sister      cerebellar ataxia     GASTROINTESTINAL DISEASE Sister      autoimmune hepatitis     Coronary Artery Disease Son      Breast Cancer Son      Colon Cancer Son      DIABETES Daughter      Breast Cancer Daughter        REVIEW OF SYSTEMS:  Constitutional:  No weight loss, fever, chills, weakness or fatigue.  HEENT:  Eyes:  No visual loss, blurred vision, double vision or yellow sclerae. No hearing loss, sneezing, congestion,  runny nose or sore throat.  Skin:  No rash or itching.  Cardiovascular: per HPI  Respiratory: per HPI  GI:  No anorexia, nausea, vomiting or diarrhea. No abdominal pain or blood.  :  No dysurea, hematuria  Neurologic:  No headache, dizziness, syncope, paralysis, ataxia, numbness or tingling in the extremities. No change in bowel or bladder control.  Musculoskeletal:  No muscle, back pain, joint pain or stiffness.  Hematologic:  No anemia, bleeding or bruising.  Lymphatics:  No enlarged nodes. No history of splenectomy.  Psychiatric:  No history of depression or anxiety.  Endocrine:  No reports of sweating, cold or heat intolerance. No polyuria or polydipsia.  Allergies:  No history of asthma, hives, eczema or rhinitis.    PHYSICAL EXAM:  Temp: 98.6  F (37  C) Temp src: Axillary BP: 107/54 Pulse: 61 Heart Rate: 62 Resp: 29 SpO2: 93 % O2 Device: BiPAP/CPAP Oxygen Delivery: 10 LPM  Vital Signs with Ranges  Temp:  [96.7  F (35.9  C)-98.6  F (37  C)] 98.6  F (37  C)  Pulse:  [60-63] 61  Heart Rate:  [59-92] 62  Resp:  [13-40] 29  BP: ()/(40-96) 107/54  FiO2 (%):  [60 %] 60 %  SpO2:  [80 %-98 %] 93 %  175 lbs 7.78 oz    Constitutional: Awake, on BiPAP, able to answer questions appropriately  Eyes: PERRL, sclera nonicteric  ENT: trachea midline  Respiratory: Decreased at the bases  Cardiovascular: Distant sounds, regular rate and rhythm, no obvious murmurs, no lower extremity edema  GI: nondistended, nontender, bowel sounds present  Lymph/Hematologic: no lymphadenopathy  Skin: dry, no rash  Musculoskeletal: good muscle tone, strength 5/5 in upper and lower extremities  Neurologic: no focal deficits  Neuropsychiatric: appropriate affact    Intake/Output Summary (Last 24 hours) at 11/23/17 0741  Last data filed at 11/23/17 0600   Gross per 24 hour   Intake           978.33 ml   Output             2150 ml   Net         -1171.67 ml       DATA:  Labs: Potassium 3.6, creatinine 1.17, NT proBNP 8300, troponin negative ×2,  WBC 9, hemoglobin 10.4, platelets 244    EKG: November 22nd: AV dual pacing, rate 81    Tele: sinus, no arrhythmias    Echo: November 22: EF 55%, inferior and septal hypokinesis, mild RV dilation with moderately decreased function, severe left atrial enlargement, no significant valve disease.    Chest CT, November 22: Previous aortic dissection is stable, no pericardial effusion, new small right and trace left pleural effusion with right lower lobe consolidation, suspicious for pneumonia

## 2017-11-23 NOTE — PLAN OF CARE
Problem: Patient Care Overview  Goal: Plan of Care/Patient Progress Review  SLP-- Spoke with nursing who reports patient not appropriate for evaluation due to not being able to get off of BiPAP yet.  Will check back tomorrow for appropriateness of swallow evaluation.

## 2017-11-23 NOTE — PROGRESS NOTES
RT Note:    Patient wore BiPAP throughout entire shift with settings of 12/5 and 60%. SpO2 in low 90s, breath sounds diminished.  Patient will continue to receive Duoneb QID as ordered.  RT will continue to monitor.    Anabel Haynes  November 23, 2017.5:26 AM

## 2017-11-23 NOTE — PLAN OF CARE
Problem: Patient Care Overview  Goal: Plan of Care/Patient Progress Review  Outcome: Improving  ICU End of Shift Summary.  For vital signs and complete assessments, please see documentation flowsheets.     Pertinent assessments: Pt is A+OX4, VSS. When 02 removed pt desats quickly into mid 80%.   Major Shift Events: Pt able to take a break from bipap to 10-15 L oxiplus FM. Started I.V lasix. Got pt up to chair with 2 assist with wheelchair. Dyspnea noted with activity.   Plan (Upcoming Events): Continue bipap tonight. Attempt to wean o2 as tolerated.   Discharge/Transfer Needs: TBD    Bedside Shift Report Completed :   Bedside Safety Check Completed:

## 2017-11-23 NOTE — PLAN OF CARE
Problem: Patient Care Overview  Goal: Plan of Care/Patient Progress Review  Outcome: No Change  ICU End of Shift Summary.  For vital signs and complete assessments, please see documentation flowsheets.     Pertinent assessments: Afebrile. LS clear, diminished upper lobes, coarse bilateral lower lobes. BiPAP 60% with SpO2 92%, RR 25-28 - labored, denies SOB. Echo showed inferior and septal hypokinesis - additional trop pending, EF 55%. Pacemaker interrogated, see chart. Cardiology consult pending. US of BLE neg for DVT. Ricklefs and family question aspiration pna - swallow eval ordered. Loyd for strict I&Os. Pressure ulcer on coccyx -open, mepilex placed. Fungal infection on right groin. NPO except meds and ice chips. IV zosyn Q6, zithro PO, NS @50/h in left hand PIV.  Major Shift Events: Placed on BiPAP  Plan (Upcoming Events): Continue BiPAP and IV abx  Discharge/Transfer Needs: TBD    Bedside Shift Report Completed :   Bedside Safety Check Completed:

## 2017-11-23 NOTE — PROGRESS NOTES
[facility]  Hospitalist Progress Note  Dulce Wilde MD 11/23/17  Text Page  Pager: [phone] (7am-6pm)    Reason for Stay (Diagnosis): Respiratory Failure         Assessment and Plan:      Summary of Stay: Kat Gomez is a 88 year old female with past medical history of CAD, prior aortic valve and ascending aortic dissection repair, complete heart block s/p PPM, paroxysmal SVT who is on Eliquis who was admitted on 11/22/2017 with several days of shortness of breath and fatigue and was found to have acute hypoxic respiratory failure and PNA as well as possible mild CHF exacerbation.  Transferred to the ICU on admission due to respiratory failure requiring BiPAP.    Problem List/Assessment and Plan:     1. Acute Hypoxic Respiratory Failure 2/2 PNA and possible CHF Exacerbation: Developed cough, fatigue and SOB about 2 days PTA.  Presented to clinic then sent to the ER where she was found to have hypoxic respiratory failure.  She had CT (no contrast) which showed small left and trace right pleural effusion with infiltrates in the RLL, left lung base and lingula.  She was initially started on Azithromycin/Ceftriaxone but this was changed to Azithromycin and Zosyn when family gave history that she may have aspirated.  She did have elevated D-dimer but could not have contrast due to RAJEEV and VQ would not be helpful given infiltrates.  US negative for DVT and she has been compliant with Eliquis so PE less likely.  Her respiratory failure is likely largely due to PNA but also small component could be CHF exacerbation, see below.  - Continue BiPAP, wean as able  - NPO while on BiPAP  - Continue Zosyn and Azithromycin  - Follow cultures    2. Mild Acute Diastolic CHF Exacerbation: Does have effusions on CXR but I suspect that these are more related to PNA than CHF exacerbation.  She has not had CP.  Troponin remained negative.  TTE showed EF of 55% with inferior and septal hypokinesis which was  reported as new but Dr. Rodriguez did review images and these appear to be present on prior study.  No evidence of ACS.  She is being diuresed.  PTA she was on HCTZ 12.5 QD which is currently being held.  - Lasix 20 mg IV BID  - Continue PTA Nifedipine, Metoprolol, Simvastatin  - Hold HCTZ  - Cardiology consulted, appreciate recommendations    3. CAD: Stable, no signs of ACS.  Continue PTA medications.    4. SSS s/p PPM: No acute issues.    5. Acute on Chronic Kidney Disease: Baseline creatinine ~1.1-1.2, on admission was elevated to 1.3.  Has improved to 1.17 today.  Repeat tomorrow.    6. Cerebellar Ataxia: Uses a walker to ambulate.  Continue PTA Amantadine and Gabapentin.    7. Recent Bimalleolar Ankle Fracture: Fell while getting into the car on 11/2/17.  She is in a CAM walking boot for 6+ weeks.  No acute issues.      DVT Prophylaxis: Eliquis  Code Status: Full Code  Discharge Dispo: Home vs TCU pending progress  Estimated Disch Date / # of Days until Disch: Suspect several more days, requiring BiPAP continuously so continues to require ICU cares        Interval History (Subjective):      Patient was seen with her bedside nurse as well as son this morning.  She states she is feeling better than last night.  Still requiring BiPAP support (was attempted to take this off this morning and quickly desaturated to the low 80s).  She is asking to eat.  Explained she cannot do this with BiPAP.  Discussed her PNA and all questions were answered.  She denies nauseas, emesis, abdominal pain or CP.  Still feels SOB but much better than last night.                  Physical Exam:      Last Vital Signs:  /58  Pulse 61  Temp 98.4  F (36.9  C) (Axillary)  Resp 28  Ht 1.524 m (5')  Wt 79.6 kg (175 lb 7.8 oz)  SpO2 91%  BMI 34.27 kg/m2    General: Alert, awake, no acute distress.  HEENT: Normocephalic and atraumatic, eyes anicteric and without scleral injection, EOMI, face symmetric, MMM.  Cardiac: RRR, normal S1,  S2. No m/g/r, no LE edema.  Pulmonary: Normal chest rise, increased work of breathing.  Course breath sounds diffusely with crackles in RLL and decreased breath sounds at bilateral bases.  BiPAP currently in place.  Abdomen: soft, non-tender, non-distended.  Normoactive bowel sounds, no guarding or rebound tenderness.  Extremities: no deformities.  Warm, well perfused.  Skin: no rashes or lesions.  Warm and Dry.  Neuro: No focal deficits.  Speech clear.  Spontaneously moving all extremities in bed.  Psych: Alert and oriented x3. Appropriate affect.         Medications:      All current medications were reviewed with changes reflected in problem list.         Data:      All new lab and imaging data was reviewed.   Labs:    Recent Labs  Lab 11/23/17  0545 11/22/17  0911    141   POTASSIUM 3.6 4.5   CHLORIDE 103 104   CO2 27 26   ANIONGAP 9 11   * 128*   BUN 34* 49*   CR 1.17* 1.30*   GFRESTIMATED 44* 39*   GFRESTBLACK 53* 47*   OLI 8.3* 8.6       Recent Labs  Lab 11/23/17  0545 11/22/17  0911   WBC 8.9 12.4*   HGB 10.4* 11.5*   HCT 31.5* 35.5   MCV 89 89    328       Recent Labs  Lab 11/22/17  1740 11/22/17  0911   TROPI <0.015 <0.015      Imaging:   Recent Results (from the past 24 hour(s))   US Lower Extremity Venous Duplex Bilateral    Narrative    US LOWER EXTREMITY VENOUS DUPLEX BILATERAL  11/22/2017 5:10 PM    HISTORY:  Rule out DVT, elevated ddimer;     TECHNIQUE:  Venous Doppler US including color flow and Doppler  waveform analysis.    FINDINGS: No thrombus was observed and there was normal  compressibility, phasic flow, and augmentation.       Impression    IMPRESSION: No evidence of  right or left lower extremity deep venous  thrombosis.    MD Dulce DEVINE MD.

## 2017-11-24 NOTE — PLAN OF CARE
Problem: Patient Care Overview  Goal: Plan of Care/Patient Progress Review  Outcome: Improving  ICU End of Shift Summary.  For vital signs and complete assessments, please see documentation flowsheets.     Pertinent assessments: A&Ox4. Denies pain. Tele SB/SR and occasionally AV/V paced. Up with Ax2 with walker and pivot. Boot to wear on RLE when OOB. Bipap at 60% fi02. Able to maintain sats >90% off bipap with oxymask 10-12L 02. L/S clear upper lobes, crackles in RML and bases. PRUETT. 2+ edema in right ankle and 1+ edema in right foot.  Major Shift Events: PIV leaking at insertion site in left hand. New PIV placed in left lower forearm with smaller gauge. Desaturates quickly to 80's off bipap/02.   Plan (Upcoming Events): SLP to consult for diet. Decrease 02 as able. Increase activity as tolerated.  Discharge/Transfer Needs: TBD, continue POC.      Bedside Shift Report Completed : yes  Bedside Safety Check Completed: yes

## 2017-11-24 NOTE — PLAN OF CARE
Problem: Patient Care Overview  Goal: Plan of Care/Patient Progress Review  Discharge Planner SLP     Patient plan for discharge: Not stated  Current status: Clinical swallow evaluation and treatment completed today. Recommend dysphagia diet level 3 with nectar thick liquids while upright, due to overt Sx of aspiration with thin liquids by cup today.   Barriers to return to prior living situation: Weakness, SOB  Recommendations for discharge: Return home with family assist, consideration for OP SLP services pending progress.   Rationale for recommendations: Patient not at baseline diet, would benefit from continued SLP services.         Entered by: Cris Seals 11/24/2017 9:56 AM

## 2017-11-24 NOTE — PROGRESS NOTES
RT-Pt was placed on BIPAP 14/6, 35% for the night. Bs diminished. Spo2 92%. RT will continue to follow.

## 2017-11-24 NOTE — CONSULTS
Care Transition Initial Assessment - RN    Reason For Consult: care coordination/care conference, discharge planning   Met with: Patient and dtg Deanne and talked with son Jim over phone.    DATA   Active Problems:    Respiratory failure with hypoxia (H)       Cognitive Status: awake, alert and oriented.  [facility]  Primary Care MD Name: Dr Del Toro  Contact information and PCP information verified: Yes, cards Dr Jiménez    Lives With: child(angelo), adult Jim.  Her Dtg Deanne is an RN on L and D at [facility] and is also supportive of pt.        Description of Support System: Supportive   Who is your support system?: Children         Insurance concerns: No Insurance issues identified    ASSESSMENT  Patient currently receives the following services:  none        Identified issues/concerns regarding health management: Pt is admitted with PNA and CHF.  Pt and dtg given PNA and CHF action care plans.  Pt lives with Jim and he does provide transportation for pt.  He is aware of the recommendations for follow up for PNA and CHF.  Pt does manage her own medications with pill trays and is compliant.  Pt is usually up independently with her walker.  Since her ankle fracture on 11/2/17 it has been more difficult to get around since her CAM boot is so heavy. She is scheduled with follow up with Dr Peter for her ankle in December.  Pt is currently on a DD 3 with nectar diet.  Son did speak with ST and already ordered pt thickener off of [employer].  Son wasn't aware of LISSET diagnosis.  Pt doesn't have a cpap at home.  He isn't aware of any sleep study in the past either.  Pt, son and dtg updated that RN CTS will follow along during her hospitalization and will be available for resources/support at time of dc.        PLAN  Patient given options and choices for discharge YES . Pt notified of resources of Home Care vs TCU depending on how she progresses during her hospital stay.  Pt currently assist of 2 and on  BIPAP.  DC date unknown  Patient/family is agreeable to the plan?  Yes:   Patient anticipates discharging to UNKNOWN .        Patient anticipates needs for home equipment: No  Discharge Planner   Discharge Plans in progress: no  Barriers to discharge plan: respiratory status  Plan/Disposition: unknown   Appointments: Pt will need to be scheduled with Primary for PNA f/u and Dr Jessy pepe for f/u for CHF        Care  (CTS) will continue to follow as needed.    Valerie MARSHALL CTS [phone]

## 2017-11-24 NOTE — PROGRESS NOTES
CLINICAL SWALLOW EVALUATION       11/24/17 0900   General Information   Onset Date 11/24/17   Start of Care Date 11/24/17   Referring Physician Dr. Aurelio Encinas   Patient Profile Review/OT: Additional Occupational Profile Info See Profile for full history and prior level of function   Patient/Family Goals Statement Patient would like to continue oral eating.    Swallowing Evaluation Bedside swallow evaluation   Behaviorial Observations Alert   Mode of current nutrition NPO   Respiratory Status O2 Supply   Type of O2 supply BiPap;Nasal cannula   Comments Weaned to NC this am   Clinical Swallow Evaluation   Oral Musculature anomalies present   Structural Abnormalities none present   Dentition present and adequate   Mucosal Quality good   Mandibular Strength and Mobility intact   Oral Labial Strength and Mobility WFL   Lingual Strength and Mobility impaired coordination   Velar Elevation intact   Buccal Strength and Mobility intact   Laryngeal Function Cough;Throat clear;Swallow;Voicing initiated;Dry swallow palpated   Oral Musculature Comments Cerebellar ataxia impacting speech and likely swallowing   Additional Documentation Yes   Clinical Swallow Eval: Thin Liquid Texture Trial   Mode of Presentation, Thin Liquids cup;self-fed;spoon   Volume of Liquid or Food Presented 3 oz   Oral Phase of Swallow Premature pharyngeal entry   Pharyngeal Phase of Swallow impaired;repeated swallows;throat clearing;coughing/choking   Diagnostic Statement Ice chips by spoon without overt Sx of aspiration, OVERT Sx of aspiration positive by cup   Clinical Swallow Eval: Nectar Thick Liquid Texture Trial   Mode of Presentation, Nectar cup;self-fed   Volume of Nectar Presented 6 oz   Oral Phase, [place] WFL   Pharyngeal Phase, [place] intact   Diagnostic Statement No overt Sx of aspiration   Clinical Swallow Eval: Puree Solid Texture Trial   Mode of Presentation, Puree spoon;self-fed   Volume of Puree Presented 4 oz   Oral Phase, Puree  WFL   Pharyngeal Phase, Puree intact   Diagnostic Statement WFL   Clinical Swallow Eval: Solid Food Texture Trial   Mode of Presentation, Solid self-fed   Volume of Solid Food Presented 6 crackers   Oral Phase, Solid Residue in oral cavity;Premature pharyngeal entry   Pharyngeal Phase, Solid impaired;throat clearing   Diagnostic Statement Sx of aspiration during talking with dry oral residue   Swallow Compensations   Swallow Compensations Pacing   Swallow Eval: Clinical Impressions   Skilled Criteria for Therapy Intervention Skilled criteria met.  Treatment indicated.   Functional Assessment Scale (FAS) 4   Dysphagia Outcome Severity Scale (JERILYN) Level 4 - JERILYN   Treatment Diagnosis Mild to moderate oropharyngeal dysphagia   Diet texture recommendations Dysphagia diet level 3;Nectar thick liquids   Recommended Feeding/Eating Techniques small sips/bites;maintain upright posture during/after eating for 30 mins;other (see comments)  (No talking, paced rate of intake)   Demonstrates Need for Referral to Another Service respiratory therapy   Therapy Frequency daily   Predicted Duration of Therapy Intervention (days/wks) 5 days   Anticipated Discharge Disposition home w/ outpatient services   Risks and Benefits of Treatment have been explained. Yes   Patient, family and/or staff in agreement with Plan of Care Yes   Clinical Impression Comments Patient presents with mild to moderate oropharyngeal dysphagia characterized by overt Sx of aspiration with thin liquids by cup, and throat clear response with dry solids during talking.  Patient has cerebellar ataxia, intermittently impacting her speech intelligibility, and tongue coordination is impaired at times, as well.  Thus, suspect patient is intermittently at risk for aspiration if movements are not coordinated adequately during swallowing.  Recommend SLP services during hospitalization to return to regular diet, as tolerated with use of compensatory swallowing strategies,  as well as education re: aspiration risks and prevention techniques.    Total Evaluation Time   Total Evaluation Time (Minutes) 30

## 2017-11-24 NOTE — PLAN OF CARE
RT note-  Patient remains on bipap 14/6 RR12, fiO2 60% with some short brakes off bipap this morning. RR-30-40..  Nebs given inline as per order. BS-decreased occasional crackles noted. Pt tachypneic on and off the bipap  RT will continue to follow

## 2017-11-24 NOTE — PROGRESS NOTES
[facility]  Hospitalist Progress Note  Tarik Doyle MD 11/24/17    Reason for Stay (Diagnosis): respiratory failure         Assessment and Plan:      Summary of Stay: Kat Gomez is a 88 year old female with past medical history of CAD, prior aortic valve repair and ascending aortic dissection repair, complete heart block s/p PPM, paroxysmal afib who is on Eliquis, CKD3, LISSET not on cpap, and diastolic CHF who was admitted on 11/22/2017 with several days of shortness of breath and fatigue and was found to have acute hypoxic respiratory failure and PNA as well as possible mild CHF exacerbation.  Transferred to the ICU on admission due to respiratory failure requiring BiPAP.  Slowly improving, but still needing bipap intermittently due to fatigue and tachypnea.  Was on NC today for much of morning.  Continuing diuresis today.  Is on zosyn/asithromycin for possible aspiration PNA.  Will try a modified diet for dysphagia.     Problem List/Assessment and Plan:      1. Acute Hypoxic Respiratory Failure 2/2 PNA and possible CHF Exacerbation: Developed cough, fatigue and SOB about 2 days PTA.  Presented to clinic then sent to the ER where she was found to have hypoxic respiratory failure.  She had CT (no contrast) which showed small left and trace right pleural effusion with infiltrates in the RLL, left lung base and lingula.  She was initially started on Azithromycin/Ceftriaxone but this was changed to Azithromycin and Zosyn when family gave history that she may have aspirated.  She did have elevated D-dimer but could not have contrast due to RAJEEV and VQ would not be helpful given infiltrates.  US negative for DVT and she has been compliant with Eliquis so PE less likely.  Her respiratory failure is likely largely due to PNA but also small component could be CHF exacerbation, see below.  - wean bipap as able.  Was on NC for much of morning, but tachypnea and tiring out  - Continue Zosyn and  Azithromycin  - diuresis as below  - Follow cultures     2. Mild Acute Diastolic CHF Exacerbation: Does have effusions on CXR but I suspect that these are more related to PNA than CHF exacerbation.  She has not had CP.  Troponin remained negative.  TTE showed EF of 55% with inferior and septal hypokinesis which was reported as new but Dr. Rodriguez did review images and these appear to be present on prior study.  No evidence of ACS.  She is being diuresed.  PTA she was on HCTZ 12.5 QD which is currently being held.  - continue Lasix 20 mg IV BID  - Continue PTA Nifedipine, Metoprolol, Simvastatin  - Hold HCTZ  - Cardiology consulted, appreciate recommendations     3. CAD: Stable, no signs of ACS.  Continue PTA metoprolol and statin.  Is on eliquis.     4. Paroxysmal afib and SSS s/p PPM: No acute issues. On metoprolol and eliquis that are continued.    5. Ascending aortic dissection s/p repair and repair of AV     6. CKD stage III: Baseline creatinine ~1.1-1.2, on admission was elevated to 1.3.    -monitor bmp daily while on diuretic.  BUN an bicarb up a small amount today with diuretic     7. Cerebellar Ataxia: Uses a walker to ambulate.  Continue PTA Amantadine and Gabapentin.     8. Recent Bimalleolar Ankle Fracture: Fell while getting into the car on 11/2/17.  She is in a CAM walking boot for 6+ weeks.  No acute issues.      9. Dysphagia:  SLP following.  DD3, nectar thick for now.    10.  Recent R ankle fx:  Recent R bimalleolar fx 11/2/17.  Non-displaced.  Currently in a CAM boot for 6+ weeks.  Ankle mildly swollen.    11.  LISSET:  Not using cpap, but likely to need this while here while she is having respiratory failure.  -bipap hs     DVT Prophylaxis: Eliquis  Code Status: Full Code  FEN:  Trial DD3, nectar thick when not needing bipap  Discharge Dispo: Home vs TCU pending progress  Estimated Disch Date / # of Days until Disch: Suspect several more days, requiring BiPAP continuously so continues to require ICU  cares        Interval History (Subjective):      Assumed care today. This morning was doing pretty well on NC.  Had some tachypnea and weakness after a few hours so back on bipap.  Did complete swallow eval and rec dd3, nectar thick.  She felt like her sob was much better and she felt ok on NC.  No chest pain.  Mild R ankle pain.                  Physical Exam:      Last Vital Signs:  /65  Pulse 61  Temp 97.6  F (36.4  C) (Axillary)  Resp 19  Ht 1.524 m (5')  Wt 76.3 kg (168 lb 3.4 oz)  SpO2 98%  BMI 32.85 kg/m2      Intake/Output Summary (Last 24 hours) at 11/24/17 1251  Last data filed at 11/24/17 1200   Gross per 24 hour   Intake           364.17 ml   Output             1825 ml   Net         -1460.83 ml       Constitutional: Awake, NAD  Eyes: sclera white  HEENT: on NC in morning  Respiratory: tachypnea noted while on NC.  Bibasilar crackles.  No wheeze  Cardiovascular: RRR.  No murmur   GI: non-tender, not distended, bowel sounds present  Genitourinary: arenas  Skin: no rash   Musculoskeletal/extremities: R ankle swollen in boot. Trace LE edema  Neurologic: A&O   Psychiatric: calm, cooperative          Medications:      All current medications were reviewed with changes reflected in problem list.         Data:      All new lab and imaging data was reviewed.   Labs:    Recent Labs  Lab 11/22/17  1125 11/22/17  0911   CULT No growth after 2 days No growth after 2 days       Recent Labs  Lab 11/24/17  0515 11/23/17  0545 11/22/17  0911    139 141   POTASSIUM 3.7 3.6 4.5   CHLORIDE 101 103 104   CO2 29 27 26   ANIONGAP 10 9 11   GLC 94 130* 128*   BUN 28 34* 49*   CR 1.26* 1.17* 1.30*   GFRESTIMATED 40* 44* 39*   GFRESTBLACK 48* 53* 47*   OLI 8.4* 8.3* 8.6       Recent Labs  Lab 11/24/17  0515 11/23/17  0545 11/22/17  0911   WBC 9.1 8.9 12.4*   HGB 10.8* 10.4* 11.5*   HCT 33.2* 31.5* 35.5   MCV 89 89 89    244 328      Imaging:   None today      Tarik Doyle MD

## 2017-11-25 NOTE — PROGRESS NOTES
Respiratory Therapy Note        Pt has remained off BIPAP today on 10 Lpm oxymizer until 17:18.  She had tried to get out of bed to use the bathroom and increased her RR and dropped her SpO2.  BIPAP was restarted at 14/6 100%; FiO2 was soon dropped to 80%.  Breath sounds are diminished with infrequent NPC.  PRUETT but not complaining of SOB at rest.    November 25, 2017 5:04 PM  Seth Chacon

## 2017-11-25 NOTE — PROGRESS NOTES
[facility]  Hospitalist Progress Note  Ankit Mckeon MD, MD 11/24/17    Reason for Stay (Diagnosis): respiratory failure         Assessment and Plan:      Summary of Stay: Kat Gomez is a 88 year old female with past medical history of CAD, prior aortic valve repair and ascending aortic dissection repair, complete heart block s/p PPM, paroxysmal afib who is on Eliquis, CKD3, LISSET not on cpap, and diastolic CHF who was admitted on 11/22/2017 with several days of shortness of breath and fatigue and was found to have acute hypoxic respiratory failure and PNA as well as possible mild CHF exacerbation.  Transferred to the ICU on admission due to respiratory failure requiring BiPAP.  Slowly improving, but still needing bipap intermittently due to fatigue and tachypnea. Still only able to come off bipap for short periods of time. Is on zosyn/asithromycin for possible aspiration PNA.  Continue modified diet for dysphagia when able to be off bipap.     Problem List/Assessment and Plan:      1. Acute Hypoxic Respiratory Failure 2/2 PNA and possible CHF Exacerbation: Developed cough, fatigue and SOB about 2 days PTA.  Presented to clinic then sent to the ER where she was found to have hypoxic respiratory failure.  She had CT (no contrast) which showed small left and trace right pleural effusion with infiltrates in the RLL, left lung base and lingula.  She was initially started on Azithromycin/Ceftriaxone but this was changed to Azithromycin and Zosyn when family gave history that she may have aspirated.  She did have elevated D-dimer but could not have contrast due to RAJEEV and VQ would not be helpful given infiltrates.  US negative for DVT and she has been compliant with Eliquis so PE less likely.  Her respiratory failure is likely largely due to PNA but also small component could be CHF exacerbation, see below.  - wean bipap as able.  Ok for breaks  - Continue Zosyn and Azithromycin  - Hold off on diuresis for  now as pt appears relatively compensated.  - Follow cultures     2. Mild Acute Diastolic CHF Exacerbation: Does have effusions on CXR but I suspect that these are more related to PNA than CHF exacerbation.  She has not had CP.  Troponin remained negative.  TTE showed EF of 55% with inferior and septal hypokinesis which was reported as new but Dr. Rodriguez did review images and these appear to be present on prior study.  No evidence of ACS.  She is being diuresed.  PTA she was on HCTZ 12.5 QD which is currently being held.  - hold lasix  - Continue PTA Nifedipine, Metoprolol, Simvastatin  - Hold HCTZ  - Cardiology assist appreciated.     3. CAD: Stable, no signs of ACS.  Continue PTA metoprolol and statin.  Is on eliquis.     4. Paroxysmal afib and SSS s/p PPM: No acute issues. On metoprolol and eliquis that are continued.    5. Ascending aortic dissection s/p repair and repair of AV     6. CKD stage III: Baseline creatinine ~1.1-1.2, on admission was elevated to 1.3.    -monitor bmp      7. Cerebellar Ataxia: Uses a walker to ambulate.  Continue PTA Amantadine and Gabapentin.     8. Recent Bimalleolar Ankle Fracture: Fell while getting into the car on 11/2/17.  She is in a CAM walking boot for 6+ weeks.  No acute issues.      9. Dysphagia:  SLP following.  DD3, nectar thick for now.    10.  Recent R ankle fx:  Recent R bimalleolar fx 11/2/17.  Non-displaced.  Currently in a CAM boot for 6+ weeks.  Ankle mildly swollen.    11.  LISSET:  Not using cpap, but likely to need this while here while she is having respiratory failure.  -bipap hs     DVT Prophylaxis: Eliquis  Code Status: Full Code  FEN:  Trial DD3, nectar thick when not needing bipap  Discharge Dispo: Home vs TCU pending progress  Estimated Disch Date / # of Days until Disch: Suspect several more days, requiring BiPAP intermittently so continues to require ICU cares        Interval History (Subjective):      Assumed care today. Still dyspneic with high fio2  requirements.  No chest pain.  Mild R ankle pain.                  Physical Exam:      Last Vital Signs:  /50  Pulse 66  Temp 98.3  F (36.8  C) (Axillary)  Resp 25  Ht 1.524 m (5')  Wt 77.6 kg (171 lb 1.2 oz)  SpO2 96%  BMI 33.41 kg/m2      Intake/Output Summary (Last 24 hours) at 11/24/17 1251  Last data filed at 11/24/17 1200   Gross per 24 hour   Intake           364.17 ml   Output             1825 ml   Net         -1460.83 ml       Constitutional: Awake, NAD  Eyes: sclera white  HEENT: on NC in morning  Respiratory: tachypnea noted while on NC.  Bibasilar crackles.  No wheeze  Cardiovascular: RRR.  No murmur   GI: non-tender, not distended, bowel sounds present  Genitourinary: arenas  Skin: no rash   Musculoskeletal/extremities: R ankle swollen in boot. Trace LE edema  Neurologic: A&O   Psychiatric: calm, cooperative          Medications:      All current medications were reviewed with changes reflected in problem list.         Data:      All new lab and imaging data was reviewed.   Labs:    Recent Labs  Lab 11/22/17  1125 11/22/17  0911   CULT No growth after 3 days No growth after 3 days       Recent Labs  Lab 11/25/17  0524 11/24/17  0515 11/23/17  0545    140 139   POTASSIUM 3.9 3.7 3.6   CHLORIDE 98 101 103   CO2 31 29 27   ANIONGAP 8 10 9   * 94 130*   BUN 26 28 34*   CR 1.10* 1.26* 1.17*   GFRESTIMATED 47* 40* 44*   GFRESTBLACK 57* 48* 53*   OLI 8.4* 8.4* 8.3*       Recent Labs  Lab 11/25/17  0524 11/24/17  0515 11/23/17  0545   WBC 10.4 9.1 8.9   HGB 10.9* 10.8* 10.4*   HCT 33.9* 33.2* 31.5*   MCV 89 89 89    286 244      Imaging:   None today      Ankit Mckeon MD, MD

## 2017-11-25 NOTE — PLAN OF CARE
Problem: Patient Care Overview  Goal: Plan of Care/Patient Progress Review  Outcome: Improving  ICU End of Shift Summary.  For vital signs and complete assessments, please see documentation flowsheets.     Pertinent assessments: Pt is A+Ox4. Up with 1 assist with walker. 02 devices ranged based on pt comfort. Dyspnea and Tachypnea with activity. Bipap on while sleep.   Major Shift Events: Swallow eval ok pt for DD3 diet nectar thick liquids.  Did not give lasix due to BP soft. MD said will hold off on lasix today. After pt was sitting up in the chair visiting pt got tired and RR increased to mid 30's to 40's. Bipap placed back on for a nap.   Plan (Upcoming Events):  Bipap overnight.   Discharge/Transfer Needs: Possibly transfer to floor tomorrow if Resp status improves and pt can tolerate being off of bipap for longer period of time.    Bedside Shift Report Completed :   Bedside Safety Check Completed:

## 2017-11-25 NOTE — PLAN OF CARE
Problem: Patient Care Overview  Goal: Plan of Care/Patient Progress Review  Outcome: No Change  ICU End of Shift Summary.  For vital signs and complete assessments, please see documentation flowsheets.     Pertinent assessments: A&O. Able to express needs. Up with SBA 1, walker, pivot to chair.  Rt ft brace, +2 . Tolerating DD3 nectar thick liquids.    Major Shift Events: Bipap 60%. LS diminished. Afebrile.  Denies pain.  Tele paced, SR.  Coccyx foam dressing cdi. BP soft, HS metoprolol held. HR   Plan (Upcoming Events): continue icu cares.  Discharge/Transfer Needs: tbd    Bedside Shift Report Completed : yes  Bedside Safety Check Completed: yes

## 2017-11-25 NOTE — PLAN OF CARE
Problem: Pneumonia (Adult)  Goal: Signs and Symptoms of Listed Potential Problems Will be Absent, Minimized or Managed (Pneumonia)  Signs and symptoms of listed potential problems will be absent, minimized or managed by discharge/transition of care (reference Pneumonia (Adult) CPG).   Outcome: No Change  ICU End of Shift Summary.  For vital signs and complete assessments, please see documentation flowsheets.     Pertinent assessments: Pt on Bipap at beginning of shift. Pt on 10l oximizer while awake. 90-94%. ls diminished. Non prod cough. Wound on coccyx, drsg d/i.  Major Shift Events: Po zithromax and iv zosyn given as ordered. Nebs. Pt up to chair with assist of 1-2 with walker. Iv tko. Brace on right ankle.  Plan (Upcoming Events): none. Continue current plan  Discharge/Transfer Needs: will need PT consult when pt can tolerate increased activity.    Bedside Shift Report Completed :   Bedside Safety Check Completed:

## 2017-11-25 NOTE — PLAN OF CARE
"Problem: Patient Care Overview  Goal: Plan of Care/Patient Progress Review    Discharge Planner SLP   Patient plan for discharge: Not stated  Current status: Pt tolerating Dysphagia Diet Level 3 (Advanced) with NECTAR-thick liquids per RN. Dislikes thickened liquids \"But I drink them\". Trials of ice chip, sips of water with timely swallow, no overt s/sx of aspiration. Pt education re: ok for single ice chips, sips of water after oral care/between meals only (cont nectar-thick liquids with meals/food/meds). Discussed with RN.  Barriers to return to prior living situation: Medical status  Recommendations for discharge: Home with assist vs TCU (consider PT/OT evaluation for further recommendations). Cont SLP pending progress.  Rationale for recommendations: Dysphagia, dysarthria. Not at baseline diet.       Entered by: Kennedy Juarez 11/25/2017 12:27 PM           "

## 2017-11-25 NOTE — PROVIDER NOTIFICATION
Pat is requesting an enema. She does use them at home. No stool. for 3+ days per pt. Order if you agree, thanks

## 2017-11-25 NOTE — PROGRESS NOTES
RT-Pt remained on BIPAP 14/6, 35% this shift. Pt tachypnic at times. Bs diminished. Spo2 94%. RT will continue to follow.

## 2017-11-26 NOTE — PROGRESS NOTES
Respiratory Therapy Note        Pt was on Oxymizer cannula at 15 Lpm SpO2 91%.  Started her on HFNC 40 Lpm 80%, SpO2 93%.    November 26, 2017 12:16 PM  Seth Chacon

## 2017-11-26 NOTE — PROGRESS NOTES
RT- patient has remained on Bipap 14/8 with FIO2 80%, RR noted to be 30-48 on Bipap.    Will continue to monitor patient.    Vidya Ji, RT  11/26/2017 5:39 AM

## 2017-11-26 NOTE — PLAN OF CARE
Problem: Pneumonia (Adult)  Goal: Signs and Symptoms of Listed Potential Problems Will be Absent, Minimized or Managed (Pneumonia)  Signs and symptoms of listed potential problems will be absent, minimized or managed by discharge/transition of care (reference Pneumonia (Adult) CPG).   Outcome: Improving  ICU End of Shift Summary.  For vital signs and complete assessments, please see documentation flowsheets.     Pertinent assessments: gets easily sob with exertion had to put bipap back on sats were in the 70's.    Major Shift Events:  Enema given with good results   Plan (Upcoming Events):  Continue to monitor and work on respiratory status  Discharge/Transfer Needs:  TBD    Bedside Shift Report Completed : Yes  Bedside Safety Check Completed: Yes

## 2017-11-26 NOTE — PLAN OF CARE
Problem: Patient Care Overview  Goal: Plan of Care/Patient Progress Review  Outcome: No Change  ICU End of Shift Summary.  For vital signs and complete assessments, please see documentation flowsheets.     Pertinent assessments: Pt A&O. Up with assist of 1 and walker. Brace remains on R ankle for previous fracture. Pt denies any pain. Continues to be SOB and tachypneic with RRs in the 30s-40s. Satting high 80s to low 90s on 10L Oxymizer. Placed on Bipap at 0000 with sats improved to mid to high 90s with minimal change to RR. LS dimnished throughout. Tele SR with BBB and occasionally V-paced. UO about 30-35 mL/hr. No stool tonight. Dressing on coccyx CDI. Turned and repositioned frequently in bed.  Major Shift Events: Discussed patient's increased RR and O2 requirements with Tele-Hub. No new orders placed.  Plan (Upcoming Events): Continue Bipap PRN and monitor respiratory status closely. Continue antibiotics.   Discharge/Transfer Needs: TBD pending progress.    Bedside Shift Report Completed   Bedside Safety Check Completed

## 2017-11-26 NOTE — PROGRESS NOTES
RT- patient placed on Bipap 14/8 with FIO2 80%.    Will continue to monitor patient.    Vidya Ji, RT  11/26/2017 12:02 AM

## 2017-11-26 NOTE — PLAN OF CARE
Problem: Patient Care Overview  Goal: Plan of Care/Patient Progress Review    SLP - Pt changed to HFNC @ 40LPM (80%). PO trials not appropriate. Recommend hold PO and free water protocol unless <25LPM HFNC or with nasal cannula or oxymizer. Will f/u tomorrow 11/27/17.

## 2017-11-26 NOTE — PROGRESS NOTES
[facility]  Hospitalist Progress Note  Dulce Wilde MD 11/24/17    Reason for Stay (Diagnosis): respiratory failure         Assessment and Plan:      Summary of Stay: Kat Gomez is a 88 year old female with past medical history of CAD, prior aortic valve repair and ascending aortic dissection repair, complete heart block s/p PPM, PAF (Eliquis), CKD3, LISSET not on CPAP, and diastolic CHF who was admitted on 11/22/2017 with several days of shortness of breath and fatigue and was found to have acute hypoxic respiratory failure and PNA as well as possible mild CHF exacerbation.  Transferred to the ICU on admission due to respiratory failure requiring BiPAP.  Slowly improving, but still needing BiPAP intermittently due to fatigue and tachypnea. Is on zosyn/azithromycin for possible aspiration PNA.  Continue modified diet for dysphagia when able to be off BiPAP.     Problem List/Assessment and Plan:      1. Acute Hypoxic Respiratory Failure 2/2 PNA and possible CHF Exacerbation: Developed cough, fatigue and SOB about 2 days PTA.  Presented to clinic then sent to the ER where she was found to have hypoxic respiratory failure.  She had CT (no contrast) which showed small left and trace right pleural effusion with infiltrates in the RLL, left lung base and lingula.  She was initially started on Azithromycin/Ceftriaxone but this was changed to Azithromycin and Zosyn when family gave history that she may have aspirated.  She did have elevated D-dimer but could not have contrast due to RAJEEV and VQ would not be helpful given infiltrates.  US negative for DVT and she has been compliant with Eliquis so PE less likely.  Her respiratory failure is likely largely due to PNA but also small component could be CHF exacerbation, see below.  Overnight had worsening respiratory status and required BiPAP for several hours to recover.  - Wean BiPAP as able.  Ok for breaks  - Continue Zosyn and Azithromycin  - Hold off  on diuresis for now as pt appears relatively compensated.  - Follow cultures    Addendum: CXR this afternoon due to increased WOB and it appears to have more infiltrate (could be edema vs progressive PNA), will give a one time dose of IV Lasix 20 mg.     2. Mild Acute Diastolic CHF Exacerbation: Does have effusions on CXR but I suspect that these are more related to PNA than CHF exacerbation.  She has not had CP.  Troponin remained negative.  TTE showed EF of 55% with inferior and septal hypokinesis which was reported as new but Dr. Rodriguez did review images and these appear to be present on prior study.  No evidence of ACS.  She was gently diuresed but now holding Lasix.  PTA she was on HCTZ 12.5 QD which is currently being held.  - Hold lasix  - Continue PTA Nifedipine, Metoprolol, Simvastatin  - Hold HCTZ  - Cardiology assist appreciated.     3. CAD: Stable, no signs of ACS.  Continue PTA metoprolol and statin.  Is on Eliquis.     4. PAF and SSS s/p PPM: No acute issues. On metoprolol and Eliquis that are continued.    5. Ascending aortic dissection s/p repair and repair of AV     6. CKD stage III: Baseline creatinine ~1.1-1.2, on admission was elevated to 1.3.  Has improved to 1.08 today.  - Monitor bmp      7. Cerebellar Ataxia: Uses a walker to ambulate.  Continue PTA Amantadine and Gabapentin.     8. Recent Bimalleolar Ankle Fracture: Fell while getting into the car on 11/2/17.  She is in a CAM walking boot for 6+ weeks.  No acute issues.      9. Dysphagia:  SLP following.  DD3, nectar thick for now.    10.  Recent R ankle fx:  Recent R bimalleolar fx 11/2/17.  Non-displaced.  Currently in a CAM boot for 6+ weeks.  Ankle mildly swollen.    11.  LISSET:  Not using CPAP, but likely to need this while here while she is having respiratory failure.  - BiPAP hs     DVT Prophylaxis: Eliquis  Code Status: Full Code  FEN:  Trial DD3, nectar thick when not needing BiPAP  Discharge Dispo: Home vs TCU pending  progress  Estimated Disch Date / # of Days until Disch: Suspect several more days, requiring BiPAP intermittently so continues to require ICU cares        Interval History (Subjective):      Patient states she still feels SOB but not bad.  Denies CP.  Has a mild intermittent cough.  No nausea or emesis.  Does not understand why she got PNA if she had the PNA shot.  States she is trying to take deep breaths.                  Physical Exam:      Last Vital Signs:  /61  Pulse 60  Temp 97.5  F (36.4  C)  Resp (!) 36  Ht 1.524 m (5')  Wt 76.1 kg (167 lb 12.3 oz)  SpO2 95%  BMI 32.77 kg/m2    General: Alert, awake, no acute distress, currently on HFNC.  HEENT: Normocephalic and atraumatic, eyes anicteric and without scleral injection, EOMI, face symmetric, MMM.  Cardiac: RRR, normal S1, S2. No m/g/r, no LE edema.  Pulmonary: Normal chest rise, increased work of breathing (on HFNC can speak 1/2 sentence before taking another breath).  Decreased breath sounds diffusely but no crackles or wheezing  Abdomen: soft, non-tender, non-distended.  Normoactive bowel sounds, no guarding or rebound tenderness.  Extremities: no deformities.  Warm, well perfused.  Skin: no rashes or lesions.  Warm and Dry.  Neuro: No focal deficits.  Speech clear.  Spontaneously moving all extremities in bed.  Psych: Alert and oriented x3. Appropriate affect.          Medications:      All current medications were reviewed with changes reflected in problem list.         Data:      All new lab and imaging data was reviewed.   Labs:    Recent Labs  Lab 11/22/17  1125 11/22/17  0911   CULT No growth after 4 days No growth after 4 days       Recent Labs  Lab 11/26/17  0541 11/25/17  0524 11/24/17  0515    137 140   POTASSIUM 3.8 3.9 3.7   CHLORIDE 102 98 101   CO2 31 31 29   ANIONGAP 5 8 10   * 135* 94   BUN 24 26 28   CR 1.08* 1.10* 1.26*   GFRESTIMATED 48* 47* 40*   GFRESTBLACK 58* 57* 48*   OLI 8.3* 8.4* 8.4*       Recent  Labs  Lab 11/25/17  0524 11/24/17  0515 11/23/17  0545   WBC 10.4 9.1 8.9   HGB 10.9* 10.8* 10.4*   HCT 33.9* 33.2* 31.5*   MCV 89 89 89    286 244      Imaging:   None today      Dulce Wilde MD

## 2017-11-27 NOTE — PLAN OF CARE
Prior to the start of the  PICC procedure and with procedural staff participation, I verbally confirmed the patient s identity using two indicators, relevant allergies, that the procedure was appropriate and matched the consent or emergent situation, and that the correct equipment/implants were available. Immediately prior to starting the procedure I conducted the Time Out with the procedural staff and re-confirmed the patient s name, procedure, and site/side. (The Joint Commission universal protocol was followed.)  Yes    Sedation (Moderate or Deep): None    Time out performed with Igor Bravo RN

## 2017-11-27 NOTE — PLAN OF CARE
Problem: Pneumonia (Adult)  Goal: Signs and Symptoms of Listed Potential Problems Will be Absent, Minimized or Managed (Pneumonia)  Signs and symptoms of listed potential problems will be absent, minimized or managed by discharge/transition of care (reference Pneumonia (Adult) CPG).   Outcome: No Change  ICU End of Shift Summary.  For vital signs and complete assessments, please see documentation flowsheets.   Pertinent assessments: Remains on Bipap with FIO2 100%. Resp mid to upper 30's all night. LS: coarse/ dim. Desats with any activity, takes a long time to recover. A&Ox4. VSS. Afebrile. Denies pain. Loyd WDL in place adequate UOP. No changes to skin or rash, Brace WDL to right fool. Tele; V-paced.   Major Shift Events: O 2 needs increased throughout the night from 75% FIO2 to 100% FIO2,  Tele Hub notified, one dose of IV Lasix 20mg ordered and Given.  Also around 0500 am pt got very confused pulling on lines and mask, Tele hub was notified and 1 mg of IV Ativan was ordered and given.  Plan (Upcoming Events): continue pulmonary toileting, wean Bipap as able, Continue IV abx, follow up cx, Home or TCU at D/c  Discharge/Transfer Needs: continue ICU cares for now  Bedside Shift Report Completed : yes  Bedside Safety Check Completed: yes    Addendum: paged tele hub due to increase patient agitation and decrease in O 2 sats, Precedex gtt ordered.

## 2017-11-27 NOTE — PROGRESS NOTES
RT Note: received pt on bipap, sats were 79-85%- intubated at 0900 with 7.0 ETT secured 22 @ lip. Bilateral breat sounds, good color change on EtCO2 and x-ray followed intubation. BS coarse/diminished suctioned for small amount of thick secretions from ETT. Continues to receive duoneb QID.     Marilyn Lopez, RRT

## 2017-11-27 NOTE — PLAN OF CARE
Problem: Restraint, Nonbehavioral (Nonviolent)  Goal: Rationale and Justification  Patient intubated and sedated - unable to follow commands- pulling at tubes and lines.  Bi-lat soft wrist restraints applied.

## 2017-11-27 NOTE — PROGRESS NOTES
ICU Attending Note    I have seen and examined Kat Gomez, reviewed the patient's history, pertinent labs, vital signs, medications, physical exam, and radiographs.  The patient is critically ill by my examination and requires continued ICU monitoring and cares    Kat Gomez was admitted to the ICU with hypoxic respiratory failure.  Hasd opacification of left lung on chest CT as well as a right pleural effusion with associated collapse of adjacent lung  Has a history of aortic valve repair, ascending aortic aneurism, complete heart block, CAD with bypass, HTN, sleep apnea, and SVT.    Recent Events:  Had progressive respiratory failure on Bipap. Discussed with her son, and proceeded to intubation. She had copious, thick secretions in her airway at the time of intubation.    Exam:  Temp:  [98  F (36.7  C)-99.1  F (37.3  C)] 99.1  F (37.3  C)  Heart Rate:  [57-69] 61  Resp:  [0-52] 47  BP: ()/(34-92) 105/52  FiO2 (%):  [75 %-100 %] 100 %  SpO2:  [81 %-95 %] 94 %    Intake/Output Summary (Last 24 hours) at 11/27/17 1055  Last data filed at 11/27/17 0800   Gross per 24 hour   Intake           296.39 ml   Output             1520 ml   Net         -1223.61 ml         Ventilation Mode: CMV/AC  FiO2 (%): 100 %  Rate Set (breaths/minute): 16 breaths/min  Tidal Volume Set (mL): 450 mL  PEEP (cm H2O): 12 cmH2O  Oxygen Concentration (%): 100 %  Resp: 47      Results:  ABG     Recent Labs  Lab 11/27/17  0540   PH 7.42   PCO2 47*   PO2 57*   HCO3 31*     CBC    Recent Labs  Lab 11/25/17 0524 11/24/17  0515 11/23/17  0545 11/22/17  0911   WBC 10.4 9.1 8.9 12.4*   HGB 10.9* 10.8* 10.4* 11.5*   HCT 33.9* 33.2* 31.5* 35.5    286 244 328     BMP    Recent Labs  Lab 11/27/17  0545 11/26/17  0541 11/25/17  0524 11/24/17  0515    138 137 140   POTASSIUM 3.6 3.8 3.9 3.7   CHLORIDE 100 102 98 101   CO2 31 31 31 29   BUN 24 24 26 28   CR 1.19* 1.08* 1.10* 1.26*   * 150* 135* 94     LFT    Recent  Labs  Lab 11/24/17  0515   AST 14   ALT 14   ALKPHOS 149   BILITOTAL 0.7   ALBUMIN 2.0*     PancreasNo lab results found in last 7 days.  INR  Lab Results   Component Value Date    INR 1.07 07/19/2016    INR 1.11 09/30/2009    INR 1.07 09/29/2009       Current Issues:  1.  Hypoxic respiratory failure: Previous CT scan suggests a VQ mismatch as cause (pneumonia and pleural effusion). Intubation and mechanical ventilation  2.  Fluid bolus for hypotension. Negative almost 5 liters since admission  3.  A fib/svt eliquis and metoprolol  4.  Chronic kidney disease: follow, Cr stable even with diuresis.  5.  Ankle facture: prior injury, boot    Evaluation and management time exclusive of procedures was 30 minutes critical care time including:  examination with the ICU team, discussion of the patient's condition with other physicians and members of the care team, reviewing all data related to the patient, and time utilizing the EMR for documentation of this patient's care.        Andrzej Low MD  [facility]ens[facility]

## 2017-11-27 NOTE — PHARMACY-CONSULT NOTE
Pharmacy Tube Feeding Consult  Medications reviewed for administration by feeding tube and for potential food/drug interactions.    Orders adjusted for feeding tube administration:  Gabapentin capsule --> liquid  Metoprolol tablet --> liquid  Amantadine capsule --> liquid    Medications ok to crush/open for FT:  Simvastatin  Apixaban    Will discuss w/ MD:   Nifedipine ER - Not able to crush nifedipine, immediate release capsule is restricted per [place] policy. Will have to hold med or find alternative therapy.  Detrol LA - Option to hold med while intubated or convert to immediate release - 2mg twice daily.        Pharmacy will continue to follow as new medications are ordered. Please contact pharmacist if additional changes are needed, thank you.

## 2017-11-27 NOTE — PROGRESS NOTES
Patient with low O2 sats and tachypnea, now obtunded. Discussed care with her son, and he wishes to proceed with intubation and mechanical ventilation, but now patient is DNR. Anesthesia contacted for intubation.    wtb

## 2017-11-27 NOTE — PROGRESS NOTES
[facility]  Hospitalist Progress Note  Renato Wilson MD    Reason for Stay (Diagnosis): respiratory failure         Assessment and Plan:      Summary of Stay: Kat Gomez is a 88 year old female with past medical history of CAD, prior aortic valve repair and ascending aortic dissection repair, complete heart block s/p PPM, PAF (Eliquis), CKD3, LISSET not on CPAP, and diastolic CHF who was admitted on 11/22/2017 with several days of shortness of breath and fatigue and was found to have acute hypoxic respiratory failure and PNA as well as possible mild CHF exacerbation. She was transferred to the ICU on admission due to respiratory failure requiring BiPAP. She was intubated on 11/27 due to worsening respiratory failure.On zosyn.Currently intubated     Problem List/Assessment and Plan:      1. Acute Hypoxic Respiratory Failure 2/2 PNA and possible CHF Exacerbation:   Developed cough, fatigue and SOB about 2 days PTA. She was initially seen in the clinic and then sent to the ER where she was found to have hypoxic respiratory failure. She had CT (no contrast) which showed small left and trace right pleural effusion with infiltrates in the RLL, left lung base and lingula.  --She was initially started on Azithromycin/Ceftriaxone but this was changed to Azithromycin and Zosyn when family gave history that she may have aspirated.  US negative for DVT and she has been compliant with Eliquis so PE less likely.  Her respiratory failure is likely largely due to PNA but also small component could be CHF exacerbation.   --Patient was intubated this morning due to worsening respiratory failure.  --Continue Zosyn  --Intensivist following for vent management       2. Mild Acute Diastolic CHF Exacerbation: Does have effusions on CXR but I suspect that these are more related to PNA than CHF exacerbation.    --She has not had CP. Troponin remained negative.    --TTE showed EF of 55% with inferior and septal  hypokinesis which was reported as new but Dr. Rodriguez did review images and these appear to be present on prior study.  No evidence of ACS.  She was gently diuresed but now holding Lasix.  PTA she was on HCTZ 12.5 QD which is currently being held.  - Continue PTA Nifedipine, Metoprolol, Simvastatin via NG tube  - Hold HCTZ  - Cardiology assist appreciated.     3. CAD: Stable, no signs of ACS.  Continue PTA metoprolol and statin.  Is on Eliquis.     4. PAF and SSS s/p PPM: No acute issues. On metoprolol and Eliquis that are continued.    5. Ascending aortic dissection s/p repair and repair of AV     6. CKD stage III: Baseline creatinine ~1.1-1.2, on admission was elevated to 1.3. Creatinine up from 1.08 to 1.19 today. IV fluid bolus ordered by intensivist. Lasix on hold.   - Monitor bmp      7. Cerebellar Ataxia: Uses a walker to ambulate.  Continue PTA Amantadine and Gabapentin.     8. Recent Bimalleolar Ankle Fracture: Fell while getting into the car on 11/2/17.  She is in a CAM walking boot for 6+ weeks.  No acute issues.      9. Dysphagia: was initially on  DD3, nectar thick.  SLP following. No has keofeed. nutrition consulted for recommendations     10.  Recent R ankle fx:  Recent R bimalleolar fx 11/2/17.  Non-displaced.  Currently in a CAM boot for 6+ weeks.  Ankle mildly swollen.No evidence of cellulitis.     11.  LISSET:  Not using CPAP, but likely to need this while here while she is having respiratory failure.  --Currently intubated     DVT Prophylaxis: Eliquis  Code Status: Full Code  FEN: Nutrition consulted for recommendations  Discharge Dispo:TBD  Estimated Disch Date / # of Days until Disch: Currently intubated. Suspect several more days of hospital course        Interval History (Subjective):      Patient had worsening respiratory status and intubated this morning.                   Physical Exam:      Last Vital Signs:  /52  Pulse 60  Temp 99.1  F (37.3  C) (Axillary)  Resp (!) 47  Ht 1.524  m (5')  Wt 73.8 kg (162 lb 11.2 oz)  SpO2 (!) 81%  BMI 31.78 kg/m2    General: Alert, awake, no acute distress, currently on HFNC.  HEENT: Normocephalic and atraumatic, eyes anicteric and without scleral injection, EOMI, face symmetric, MMM.  Cardiac: RRR, normal S1, S2. No m/g/r, no LE edema.  Pulmonary: Normal chest rise, increased work of breathing (on HFNC can speak 1/2 sentence before taking another breath).  Decreased breath sounds diffusely but no crackles or wheezing  Abdomen: soft, non-tender, non-distended.  Normoactive bowel sounds, no guarding or rebound tenderness.  Extremities: no deformities.  Warm, well perfused.  Skin: no rashes or lesions.  Warm and Dry.  Neuro: No focal deficits.  Speech clear.  Spontaneously moving all extremities in bed.  Psych: Alert and oriented x3. Appropriate affect.          Medications:      All current medications were reviewed with changes reflected in problem list.         Data:      All new lab and imaging data was reviewed.   Labs:    Recent Labs  Lab 11/22/17  1125 11/22/17  0911   CULT No growth after 5 days No growth after 5 days       Recent Labs  Lab 11/27/17  0545 11/26/17  0541 11/25/17  0524    138 137   POTASSIUM 3.6 3.8 3.9   CHLORIDE 100 102 98   CO2 31 31 31   ANIONGAP 6 5 8   * 150* 135*   BUN 24 24 26   CR 1.19* 1.08* 1.10*   GFRESTIMATED 43* 48* 47*   GFRESTBLACK 52* 58* 57*   OLI 8.5 8.3* 8.4*       Recent Labs  Lab 11/25/17  0524 11/24/17  0515 11/23/17  0545   WBC 10.4 9.1 8.9   HGB 10.9* 10.8* 10.4*   HCT 33.9* 33.2* 31.5*   MCV 89 89 89    286 244      Imaging:   None today      Renato Wilson MD, MD

## 2017-11-27 NOTE — PROGRESS NOTES
Respiratory Therapy Note        Pt went back on BIPAP at 16:17 after she tried to move herself.  Her RR was in the 60's and took some time to come down.  Changed BIPAP from S/T to PCV and this seemed to help calm her breathing conciderably.  The only difference between S/T and PCV mode is in PCV she is given a set inspiratory time with every breath, not just machine driven breaths as in S/T.    November 26, 2017 6:08 PM  Seth Chacon

## 2017-11-27 NOTE — PLAN OF CARE
Problem: Patient Care Overview  Goal: Plan of Care/Patient Progress Review  Chart reviewed, Patient requiring BiPAP support with plans to proceed with intubation this morning.  Will discontinue SLP order at this time and await re-order post-extubation, as appropriate.

## 2017-11-27 NOTE — PROGRESS NOTES
RT- arterial blood gas drawn from left radial artery with patient on 100% Bipap.    Vidya Ji, RT  11/27/2017 6:12 AM

## 2017-11-27 NOTE — PROGRESS NOTES
RT- patient has remained on Bipap 14/8 with FIO2 titrated to 100% due to persistent hypoxia.    Breath sounds diminished, RR has been 32-40 throughout the night with abdominal muscle use noted.    Will continue to monitor patient.    Vidya Ji, RT  11/27/2017 5:47 AM

## 2017-11-27 NOTE — ANESTHESIA CARE TRANSFER NOTE
Patient: Kat Gomez    * No procedures listed *    Diagnosis: * No pre-op diagnosis entered *  Diagnosis Additional Information: No value filed.    Anesthesia Type:   No value filed.     Note:  Airway :ETT  Patient transferred to:ICU  Comments: Called to  for intubation.  Pts chart reviewed, questions were answered and risks discussed.   Propofol 110mg given. 7.0 OETT passed thru open cords with aid of glidescope. Poncho BS ausc, ETCO2 positive.   Atraumatic, dentition intact.  Report to ICU RN, VSS.  Transfer careICU Handoff: Call for PAUSE to initiate/utilize ICU HANDOFF, Identified Patient, Identified Responsible Provider, Reviewed the Pertinent Medical History, Discussed Surgical Course, Reviewed Intra-OP Anesthesia Management and Issues during Anesthesia, Set Expectations for Post Procedure Period and Allowed Opportunity for Questions and Acknowledgement of Understanding      Vitals: (Last set prior to Anesthesia Care Transfer)    CRNA VITALS  11/27/2017 0830 - 11/27/2017 0916      11/27/2017             EKG: NSR                Electronically Signed By: TOO Guido CRNA  November 27, 2017  9:16 AM

## 2017-11-27 NOTE — PROGRESS NOTES
Called by bedside RN for increasing O2 needs on NIPPV.    Pt with acute hypoxic respiratory failure; likely Pneumonia and CHF exacerbation (prior NT pro BNP was 8K). Receiving diuretics with gradual improvement in creatinine.    Will repeat dose of 20mg furosemide and monitor for effect.    Kayy Loomis

## 2017-11-27 NOTE — PROGRESS NOTES
Called for agitation; pt with lower sats overnight. ABG shows normal pH, normal CO2, but low O2. Extra dose of furosemide given earlier in the morning. Still tachypnic, agitated. Will start very low dose dexmedetomidine if tolerated by heart rate and blood pressure.    Kayy Loomis

## 2017-11-28 NOTE — PROGRESS NOTES
Feeding tube    RN states unable to use feeding tube as not flushing or allowing formula to infuse. Feeding tube retracted, stylet reinserted. Tube advanced with Cortrak guidance, appropriate tracing obtained consistent with duodenal placement. Awaiting xray confirmation.

## 2017-11-28 NOTE — PLAN OF CARE
Problem: Restraint, Nonbehavioral (Nonviolent)  Goal: Rationale and Justification  Outcome: No Change  Cont restraints

## 2017-11-28 NOTE — PLAN OF CARE
Problem: Restraint, Nonbehavioral (Nonviolent)  Goal: Rationale and Justification  Outcome: No Change  Right wrist and Left wrist restraints continued 11/28/2017    Clinical Justification: Pulling lines, pulling tubes, and pulling equipment  Less Restrictive Alternative: 1:1 patient care, Reorientation, Repositioning  Attending Physician Notified: Yes, Attending Physician's Name: Ally   New orders placed No  Length of Order: 1 Day      Santa Hampton

## 2017-11-28 NOTE — PLAN OF CARE
Problem: Pneumonia (Adult)  Goal: Signs and Symptoms of Listed Potential Problems Will be Absent, Minimized or Managed (Pneumonia)  Signs and symptoms of listed potential problems will be absent, minimized or managed by discharge/transition of care (reference Pneumonia (Adult) CPG).   Outcome: No Change  ICU End of Shift Summary.  For vital signs and complete assessments, please see documentation flowsheets.     Pertinent assessments: Propofol stopped per tele hub 2/2 soft BP,Precedex increased and pt rested well,around 0330 pt dislodged ET tube with her tongue,tube repositioned by RT and Chest Xray obtained to confirm tube placement. Sats remained above 93% throughout this episode,Precedex increased to 0.7  Major Shift Events:   Plan (Upcoming Events): Wean 02 as tolerated   Discharge/Transfer Needs: TBD    Bedside Shift Report Completed : yes  Bedside Safety Check Completed:yes

## 2017-11-28 NOTE — PROGRESS NOTES
[facility]  Hospitalist Progress Note  Renato Wilson MD, MD     Reason for Stay (Diagnosis): respiratory failure         Assessment and Plan:      Summary of Stay: Kat Gomez is a 88 year old female with past medical history of CAD, prior aortic valve repair and ascending aortic dissection repair, complete heart block s/p PPM, PAF (Eliquis), CKD3, LISSET not on CPAP, and diastolic CHF who was admitted on 11/22/2017 with several days of shortness of breath and fatigue and was found to have acute hypoxic respiratory failure and PNA as well as possible mild CHF exacerbation. She was transferred to the ICU on admission due to respiratory failure requiring BiPAP. She was intubated on 11/27 due to worsening respiratory failure.On zosyn.Currently intubated     Problem List/Assessment and Plan:      1. Acute Hypoxic Respiratory Failure 2/2 pneumonia, intubated on 11/27  --CT (no contrast) which showed small left and trace right pleural effusion with infiltrates in the RLL, left lung base and lingula.  --She was initially started on Azithromycin/Ceftriaxone but this was changed to Azithromycin and Zosyn when family gave history that she may have aspirated.   -- US negative for DVT and she has been compliant with Eliquis  --Continue Zosyn, day # 3  --Intensivist following for vent management    2. Mild Acute Diastolic CHF Exacerbation:   -- She was gently diuresed but now holding Lasix.    --She has not had CP. Troponin remained negative.    --TTE showed EF of 55% with inferior and septal hypokinesis similar to prior study.  No evidence of ACS. --PTA she was on HCTZ 12.5 QD which is currently being held.  --Continue PTA Nifedipine, Metoprolol, Simvastatin via NG tube  --Hold HCTZ  --Appreciate cardiology input     3. CAD: Stable, no signs of ACS. Continue PTA metoprolol and statin. Is on Eliquis.     4. PAF and SSS s/p PPM: No acute issues. On metoprolol and Eliquis that are continued.    5.  Ascending aortic dissection s/p repair and repair of AV     6. CKD stage III: Baseline creatinine ~1.1-1.2. Creatinine up from 1.19 to 1.35. Diuretics on hold.   - Monitor bmp      7. Cerebellar Ataxia: Uses a walker to ambulate. Continue PTA Amantadine and Gabapentin.     8. Recent Bimalleolar Ankle Fracture: Fell while getting into the car on 11/2/17. She is in a CAM walking boot for 6+ weeks.  No acute issues.      9. Dysphagia: now has keofeed. Dietician following for diet recommendations.     10.  Recent R ankle fx:  Recent R bimalleolar fx 11/2/17.  Non-displaced.  Currently in a CAM boot for 6+ weeks.  Ankle mildly swollen.No evidence of cellulitis.     11.  LISSET:  Not using CPAP, but likely to need this while here while she is having respiratory failure.  --Currently intubated     DVT Prophylaxis: Eliquis  Code Status: Full Code  FEN: Per dietician.   Discharge Dispo:TBD  Estimated Disch Date / # of Days until Disch: Currently intubated. Suspect several more days of hospital course        Interval History (Subjective):      Patient is stable on vent. Opening eyes with command.                   Physical Exam:      Last Vital Signs:  /50  Pulse 60  Temp 98.4  F (36.9  C) (Axillary)  Resp 18  Ht 1.524 m (5')  Wt 78 kg (171 lb 15.3 oz)  SpO2 91%  BMI 33.58 kg/m2    General: Alert, awake, no acute distress, currently on HFNC.  HEENT: Normocephalic and atraumatic, eyes anicteric and without scleral injection, EOMI, face symmetric, MMM.  Cardiac: RRR, normal S1, S2. No m/g/r, no LE edema.  Pulmonary: Normal chest rise, increased work of breathing (on HFNC can speak 1/2 sentence before taking another breath).  Decreased breath sounds diffusely but no crackles or wheezing  Abdomen: soft, non-tender, non-distended.  Normoactive bowel sounds, no guarding or rebound tenderness.  Extremities: no deformities.  Warm, well perfused.  Skin: no rashes or lesions.  Warm and Dry.  Neuro: No focal deficits.  Speech  clear.  Spontaneously moving all extremities in bed.  Psych: Alert and oriented x3. Appropriate affect.          Medications:      All current medications were reviewed with changes reflected in problem list.         Data:      All new lab and imaging data was reviewed.   Labs:    Recent Labs  Lab 11/22/17  1125 11/22/17  0911   CULT No growth No growth       Recent Labs  Lab 11/28/17  0530 11/27/17  0545 11/26/17  0541    137 138   POTASSIUM 3.5 3.6 3.8   CHLORIDE 104 100 102   CO2 26 31 31   ANIONGAP 11 6 5   * 128* 150*   BUN 35* 24 24   CR 1.35* 1.19* 1.08*   GFRESTIMATED 37* 43* 48*   GFRESTBLACK 45* 52* 58*   OLI 8.0* 8.5 8.3*       Recent Labs  Lab 11/28/17  0530 11/25/17  0524 11/24/17  0515   WBC 15.2* 10.4 9.1   HGB 9.4* 10.9* 10.8*   HCT 30.0* 33.9* 33.2*   MCV 90 89 89    296 286      Imaging:   None today      Renato Wilson MD, MD

## 2017-11-28 NOTE — PROGRESS NOTES
ICU Attending Note    I have seen and examined Kat Gomez, reviewed the patient's history, pertinent labs, vital signs, medications, physical exam, and radiographs.  The patient is critically ill by my examination and requires continued ICU monitoring and cares    Kat Gomez was admitted to the ICU with hypoxic respiratory failure.  Hasd opacification of left lung on chest CT as well as a right pleural effusion with associated collapse of adjacent lung  Has a history of aortic valve repair, ascending aortic aneurism, complete heart block, CAD with bypass, HTN, sleep apnea, and SVT.    Recent Events:  Intubated for respiratory failure 11/27. Requiring high levels of FiO2 and PEEP in spite of unremarkable CXR. If no improvement over next 24 hours, consider chest CT.    Exam:  Temp:  [98.4  F (36.9  C)-100.1  F (37.8  C)] 98.4  F (36.9  C)  Heart Rate:  [59-73] 61  Resp:  [8-29] 8  BP: ()/(31-91) 121/60  FiO2 (%):  [85 %] 85 %  SpO2:  [87 %-100 %] 99 %    Intake/Output Summary (Last 24 hours) at 11/28/17 0955  Last data filed at 11/28/17 0800   Gross per 24 hour   Intake          1835.62 ml   Output              725 ml   Net          1110.62 ml             Ventilation Mode: CMV/AC  FiO2 (%): 85 % (weaned to 75%)  Rate Set (breaths/minute): 16 breaths/min  Tidal Volume Set (mL): 450 mL  PEEP (cm H2O): 12 cmH2O  Oxygen Concentration (%): 75 %  Resp: 8      Results:  ABG     Recent Labs  Lab 11/28/17  0824 11/27/17  0540   PH 7.46* 7.42   PCO2 39 47*   PO2 112* 57*   HCO3 28 31*     CBC    Recent Labs  Lab 11/28/17  0530 11/25/17  0524 11/24/17  0515 11/23/17  0545   WBC 15.2* 10.4 9.1 8.9   HGB 9.4* 10.9* 10.8* 10.4*   HCT 30.0* 33.9* 33.2* 31.5*    296 286 244     BMP    Recent Labs  Lab 11/28/17  0530 11/27/17  0545 11/26/17  0541 11/25/17  0524    137 138 137   POTASSIUM 3.5 3.6 3.8 3.9   CHLORIDE 104 100 102 98   CO2 26 31 31 31   BUN 35* 24 24 26   CR 1.35* 1.19* 1.08* 1.10*   GLC  160* 128* 150* 135*     LFT    Recent Labs  Lab 11/24/17  0515   AST 14   ALT 14   ALKPHOS 149   BILITOTAL 0.7   ALBUMIN 2.0*     PancreasNo lab results found in last 7 days.  INR  Lab Results   Component Value Date    INR 1.07 07/19/2016    INR 1.11 09/30/2009    INR 1.07 09/29/2009       Current Issues:  1.  Hypoxic respiratory failure: Previous CT scan suggests a VQ mismatch as cause (pneumonia and pleural effusion). Intubation and mechanical ventilation. Keep peak plateau pressures < 30 mm Hg  2.  Fluid boluses for hypotension. Was negative almost 5 liters since admission. D/C propofol, use dexmedetomodine  3.  A fib/svt eliquis and metoprolol  4.  Chronic kidney disease: follow, Cr stable even with diuresis.  5.  Ankle facture: prior injury, boot    Evaluation and management time exclusive of procedures was 30 minutes critical care time including:  examination with the ICU team, discussion of the patient's condition with other physicians and members of the care team, reviewing all data related to the patient, and time utilizing the EMR for documentation of this patient's care.        Andrzej Low MD  [facility] ER Intensivist Service

## 2017-11-28 NOTE — PROGRESS NOTES
Called to assess ETT placement.  Pt appears to have used tongue to push ETT out.  Placed back at 25 cm at the teeth as charted earlier.  BS equal bilaterally.

## 2017-11-28 NOTE — PROGRESS NOTES
RT note: pt remains on full vent support, setting changed this shift. Currently on CMV 16/400/+10/70%. BS coarse, suctioned for cloudy white secretions from ETT. Continues to receive duoneb QID.

## 2017-11-28 NOTE — PROGRESS NOTES
RT note:    Patient remains on mechanical ventilator. Currently settings are CMV/AC R16, , peep of 12, and 85% FIO2. Breath sounds are coarse slightly diminished pre and post neb treatment. Suctioned moderate amount of thick white secretions. Will continue to follow and monitor.    Faye Barnett, RRT

## 2017-11-28 NOTE — PLAN OF CARE
Problem: Pneumonia (Adult)  Goal: Signs and Symptoms of Listed Potential Problems Will be Absent, Minimized or Managed (Pneumonia)  Signs and symptoms of listed potential problems will be absent, minimized or managed by discharge/transition of care (reference Pneumonia (Adult) CPG).   Outcome: No Change  Patient restless at times, tolerating vent with medication assist.  Slight temp 100.1 orally, slight liquid drainage from rectum, lung sound ant/post coarse rhonci throughout.  uo adequate. Daughter and son in to visit

## 2017-11-28 NOTE — PLAN OF CARE
Problem: Restraint, Nonbehavioral (Nonviolent)  Goal: Rationale and Justification  Outcome: No Change  Cont restraints for protection of patient

## 2017-11-29 NOTE — PROGRESS NOTES
[facility]  WO Nurse Inpatient Adult Pressure Injury (PI) Wound Assessment     Assessment of PI(s) on pt's:   Coccyx    Data:   Patient History:      per MD note(s):   88 year old female with a history of Aortic Valve Repair, Ascending Aortic Dissection s/p repair, Complete Heart Block s/p PPM, CAD s/p bypass, HTN, HLD, LISSET not on cpap, Syncope, Paroxysmal SVT who presents to the ED today from the [facility] [place] 2/2 shortness of breath and fatigue.     Current mattress:  Evolve ICU bed  Current pressure relieving devices:  Mepilex dressing and Pillows    Moisture Management:  Urinary Catheter    Catheter secured? Yes    Current Diet / Nutrition:           Active Diet Order      NPO for Medical/Clinical Reasons Except for: NPO but receiving Tube Feeding    Tod Assessment and sub scores:   Tod Score  Av.7  Min: 12  Max: 17   Labs:   Recent Labs   Lab Test  17   0540   17   1044   16   1228   ALBUMIN  1.7*   < >  2.9*   < >  3.7   HGB  9.0*   < >  12.2   < >  15.9*   INR   --    --    --    --   1.07   WBC  15.0*   < >  7.2   < >  10.1   A1C   --    --   5.3   < >   --     < > = values in this interval not displayed.                                                                                                                          Pressure Injury Assessment  (location #1):   Coccyx    Wound History:   Present on admission    Specific Dimensions (length x width x depth, in cm) :   1 x 0.5 x 0.1cm     Wound Base: 100% fairly dry whitish- yellow tissue, unsure if dermis- Stage 2 vs Slough- Unstageable ,      Palpation of the wound bed:  normal    Slough appearance:  adherent, dry, white and yellow    Eschar appearance:  none    Periwound Skin: intact, non blanchable erythema 0.2cm entire periwound, minimal erosion    Temperature  Warm- pt feverish    Drainage:  none       Odor: none    Pain:  unable to assess , pt intubated         Intervention:     Patient's chart  "evaluated.      Tod Interventions:  Current Tod Interventions and Care Plan reviewed and updated, appropriate at this time.    Wound was assessed.    Wound Care: was done: Removal of existing dressing; Visual inspection; Re-Application of clean dressing    Positioned with RNs and cool cloth to forehead,    Orders  Written    Supplies  Gathered and Placed at Bedside    Discussed plan of care with Nurses           Assessment:     Pressure Injury located on coccyx: suspect the wound base is more white than yellow; Stage 2 vs Unstageable present on admission but will need to continue to follow to see how wound evolves.     Status: wound  initial assessment and is evolving, Asymptomatic    R reports 2 small ~ 0.5 x 0.3cm red abrasions to superior perineal area; suspect arenas caused skin tear due to dry mucous membranes to inocente area. RN using Inocente wash cleanser as care. Would prefer to use Critic aid paste but son reports that may cause UTI for pt and would prefer to not use that at all.          Plan:     Nursing to notify the Provider(s) and re-consult the [facility] Nurse if wound(s) deteriorate(s)or if the wound care plan needs reevaluation.    Plan for wound care to wound located on coccyx: Every 3 days    1. Cleanse wound gently with MicroKlenz spray/ pat dry    2. Apply Mepilex 4x4 dressing at dasia angle to cleft, spread skin flat before application. Date  3. No sting barrier to periwound to improve adherence    4. PIP ACTIVITY MEASURES:  If pt is refusing to turn or reposition they must be educated on the  potential injury from not off loading pressure.  Then this \"educated refusal\" needs to be documented as an \"educated refusal to turn/ reposition\" and document if alert, etc.    CHAIR: Pt should sit on a chair cushion ([id number]) when up to the chair and not sit for more than one hour at a time before fully offloading backside (either stand for a couple of minutes and/or return to bed, positioning on a side) to " "relieve pressure and re-perfuse tissue.  Additionally, encourage pt to shift side to side every 15 minutes, too.      BED:  Reposition side to side every 1-2 hours when awake.   Keep heels elevated  As able keep HOB below 30 degrees  Evaluate and consider a Pulsate specialty mattress  NOTE: If pt is refusing to turn or reposition they must be educated on the potential injury from not offloading pressure.  Then this \"educated refusal\" needs to be documented as an \"educated refusal to turn/ reposition\" and document if alert, etc.  Additionally please notify MD and charge nurse of refusal(s).            WOC Nurse will return: weekly/PRN  Face to face time: 25 min    "

## 2017-11-29 NOTE — PROGRESS NOTES
NUTRITION BRIEF NOTE    See RD note 11/27 for full assessment details    New findings in last 24 hours:    EN started 11/27, goal rate reached 11/28:    Type of Feeding Tube: NJ (10 fr, placed initailly 11/27, post pyloric 11/28 with x-ray confirmation)    Enteral Frequency:  Continuous    Enteral Regimen: Peptamen Intense VHP at 35 mL/hr    Total Enteral Provisions: 840 mL provides 840 kcal, 78 gm protein, 706 ml free H2O, 66 gm CHO and 3 gm Fiber daily. Meets <100% of DRI's.    Free Water Flush: standard 60 mL q4 hours      BM: 11/25 --> RN aware    Labs: BG <180    Meds: Precedex, propofol lff    Assessed Nutrition Needs:  (PER APPROVED PRACTICE GUIDELINES, Dosing weight: 73.8 kg for energy, 45 kg IBW for protein):  Estimated Energy Needs: 810-1040 kcals (11-14 Kcal/Kg)  Justification: obese and vented  Estimated Protein Needs: 67-90+ grams protein (1.5-2+ g pro/Kg)  Justification: hypercatabolism with critical illness and CKD  Estimated Fluid Needs: >1 mL/Kcal  Justification: maintenance    Interventions:    Recommend TF regimen as follows:    Type of Feeding Tube: NJ    Enteral Frequency:  Continuous    Enteral Regimen: Peptamen Intense VHP at 40 mL/hr    Total Enteral Provisions: 960 mL provides 960 kcal, 89 gm protein (2 gm per kg IBW, 1.1 g per kg actual body weight), 806 ml free H2O, 75 gm CHO and 4 gm Fiber daily. Meets < 100% of DRI's --> Certavite    Free Water Flush: 60 mL q4 hours      Nutrition education: discussed EN regimen with son at bedside    Collaboration and Referral of care: Discussed patient during interdisciplinary care rounds this morning    Please page/consult as needed.       Barbara Sanon RDN , [facility]  3rd floor/ICU: [phone]  All other floors: [phone]  Weekend/holiday: [phone]  Office: [phone]

## 2017-11-29 NOTE — PROGRESS NOTES
Respiratory Therapy Note         Pt remains on full vent support and is not ready for a PS attempt at this time due to PEEP of 10 and High FiO2. Fair synchrony with the vent, she continues to have tachypnea, and is fighting Plat pressure measurements.  Breath sounds are diminished and coarse.  Suctioning back little to no secretions.     November 29, 2017.5:46 PM  Seth Chacon

## 2017-11-29 NOTE — PROGRESS NOTES
Patient continues on ventilator with current settings:    Ventilation Mode: CMV/AC  FiO2 (%): 70 %  Rate Set (breaths/minute): 16 breaths/min  Tidal Volume Set (mL): 400 mL  PEEP (cm H2O): 10 cmH2O  Oxygen Concentration (%): 70 %  Resp: 21    BS coarse but clears with suction, suctioning small cloudy thick secretions. Continues to receive duoneb QID. RT will continue to follow.

## 2017-11-29 NOTE — PROGRESS NOTES
ICU Attending Note    I have seen and examined Kta Gomez, reviewed the patient's history, pertinent labs, vital signs, medications, physical exam, and radiographs.  The patient is critically ill by my examination and requires continued ICU monitoring and cares    Kat Gomez was admitted to the ICU with hypoxic respiratory failure.  Hasd opacification of left lung on chest CT as well as a right pleural effusion with associated collapse of adjacent lung  Has a history of aortic valve repair, ascending aortic aneurism, complete heart block, CAD with bypass, HTN, sleep apnea, and SVT.    Recent Events:  Intubated for respiratory failure 11/27. Requiring high levels of FiO2 and PEEP in spite of unremarkable CXR. If no improvement over next 24 hours, consider chest CT. Start PT/OT    Exam:  Temp:  [97.4  F (36.3  C)-100.8  F (38.2  C)] 99  F (37.2  C)  Heart Rate:  [59-98] 60  Resp:  [12-26] 26  BP: (100-123)/(44-87) 116/56  FiO2 (%):  [70 %-80 %] 75 %  SpO2:  [91 %-98 %] 96 %    Intake/Output Summary (Last 24 hours) at 11/29/17 1119  Last data filed at 11/29/17 1000   Gross per 24 hour   Intake           1665.1 ml   Output              525 ml   Net           1140.1 ml                 Ventilation Mode: CMV/AC  FiO2 (%): 75 %  Rate Set (breaths/minute): 16 breaths/min  Tidal Volume Set (mL): 400 mL  PEEP (cm H2O): 10 cmH2O  Oxygen Concentration (%): 75 %  Resp: 26      Results:  ABG     Recent Labs  Lab 11/28/17 0824 11/27/17 0540   PH 7.46* 7.42   PCO2 39 47*   PO2 112* 57*   HCO3 28 31*     CBC    Recent Labs  Lab 11/29/17  0540 11/28/17  0530 11/25/17  0524 11/24/17  0515   WBC 15.0* 15.2* 10.4 9.1   HGB 9.0* 9.4* 10.9* 10.8*   HCT 28.0* 30.0* 33.9* 33.2*    241 296 286     BMP    Recent Labs  Lab 11/28/17 0530 11/27/17  0545 11/26/17  0541 11/25/17  0524    137 138 137   POTASSIUM 3.5 3.6 3.8 3.9   CHLORIDE 104 100 102 98   CO2 26 31 31 31   BUN 35* 24 24 26   CR 1.35* 1.19* 1.08* 1.10*    * 128* 150* 135*     LFT    Recent Labs  Lab 11/24/17  0515   AST 14   ALT 14   ALKPHOS 149   BILITOTAL 0.7   ALBUMIN 2.0*     PancreasNo lab results found in last 7 days.  INR  Lab Results   Component Value Date    INR 1.07 07/19/2016    INR 1.11 09/30/2009    INR 1.07 09/29/2009       Current Issues:  1.  Hypoxic respiratory failure: Previous CT scan suggests a VQ mismatch as cause (pneumonia and pleural effusion). Intubation and mechanical ventilation. Keep peak plateau pressures < 30 mm Hg  2.  Fluid boluses for hypotension. Was negative almost 5 liters since admission. D/C propofol, use dexmedetomodine  3.  A fib/svt eliquis and metoprolol  4.  Chronic kidney disease: follow, Cr stable even with diuresis.  5.  Ankle facture: prior injury, boot    Evaluation and management time exclusive of procedures was 30 minutes critical care time including:  examination with the ICU team, discussion of the patient's condition with other physicians and members of the care team, reviewing all data related to the patient, and time utilizing the EMR for documentation of this patient's care.        Andrzej Low MD  [facility] Intensivist Service

## 2017-11-29 NOTE — PROGRESS NOTES
SPIRITUAL HEALTH SERVICES Progress Note  [facility] ICU 3rd floor    SH visit per casual referral from IDT. Pt, Kat, is currently intubated and awake. She opens her eyes and is able to nod yes/no to questions. Kat had been seen before by SH and had invited prayer. I asked if she would appreciate prayer today and she nodded yes. Kat then reached out with her hand, and holding hands, we shared in prayer together.   Will continue to follow 1-2x wk for ongoing spiritual/emotional support.     JOANNE Fay.  Staff    Pager #[phone]

## 2017-11-29 NOTE — PROGRESS NOTES
Respiratory Therapy Note        Sputum sample obtained.    November 29, 2017 8:45 AM  Seth Chacon

## 2017-11-29 NOTE — PROGRESS NOTES
[facility]  Hospitalist Progress Note  Name: Kat Gomez    MRN: [id number]  Provider:  Adilia Ruiz DO    Initial presenting complaint/issue to hospital (Diagnosis): respiratory failure       Assessment and Plan:      Summary of Stay:   Kat Gomez is a 88 year old female with past medical history of CAD, prior aortic valve repair and ascending aortic dissection repair, complete heart block s/p PPM, PAF (Eliquis), CKD3, LISSET not on CPAP, and diastolic CHF who was admitted on 11/22/2017 with several days of shortness of breath and fatigue and was found to have acute hypoxic respiratory failure and PNA as well as possible mild CHF exacerbation. She was transferred to the ICU on admission due to respiratory failure requiring BiPAP. She was intubated on 11/27 due to worsening respiratory failure.      Problem List/Assessment and Plan:       1. Acute Hypoxic Respiratory Failure 2/2 pneumonia, intubated on 11/27  --CT (no contrast) which showed small left and trace right pleural effusion with infiltrates in the RLL, left lung base and lingula.  --She was initially started on Azithromycin/Ceftriaxone but this was changed to Azithromycin and Zosyn when family gave history that she may have aspirated.   -- US negative for DVT and she has been compliant with Eliquis  --Continue Zosyn, day # 4    She is still required high level of PEEP to maintain adequate oxygen saturations.  FiO2 is needing to be increased to 75% at that time (morning rounds)    D/W intensivist rounding in ICU today.  If no improvement over the next 24 hours---will consider repeat chest CT 11/30/17.       2. Mild Acute Diastolic CHF Exacerbation:   -- She was gently diuresed on and off for four days - none since 11/27/17  --She has not had CP. Troponin remained negative.    --TTE (1/22/17) showed EF of 55% with inferior and septal hypokinesis similar to prior study.  No evidence of ACS.   --PTA she was on HCTZ 12.5 QD  "which is currently being held.  --Continue PTA Nifedipine, Metoprolol, Simvastatin via NG tube      3. CAD: Stable, no signs of ACS. Continue PTA metoprolol and statin. Is on Eliquis.      4. PAF and SSS s/p PPM: No acute issues. On metoprolol and Eliquis that are continued.     5. Ascending aortic dissection s/p repair and repair of AV      6. CKD stage III: Baseline creatinine ~1.1-1.2. Creatinine now back to baseline. Diuretics on hold, as above.  - Monitor bmp       7. Cerebellar Ataxia: Uses a walker to ambulate. Continue PTA Amantadine and Gabapentin.      8. Recent Bimalleolar Ankle Fracture: Fell while getting into the car on 11/2/17. She is in a CAM walking boot for 6+ weeks.  No acute issues.       9. Dysphagia: now has keofeed. Dietician following for diet recommendations.      10.  LISSET:  Not using CPAP, but likely to need this while here while she is having respiratory failure.  --Currently intubated      11. Hypokalemia  Potassium noted to be low on am labs.  Will order electrolyte protocol and will check magnesium level.    Addendum - 1235  Notified that pt is now noted to have a fever or 101.  Will order UA/UC and blood cultures x 2.  No change in respiratory status--maintaining oxygen saturations in the high 90's on current PEEP and FiO2.  Respiratory viral panel also ordered now and is pending    DVT Prophylaxis: Eliquis  Code Status: Full Code  FEN: Per dietician.   Discharge Dispo:TBD  Estimated Disch Date / # of Days until        Interval History:      Pt intubated and sedated.  Required an increase in FiO2 earlier this am to keep sats in the 90's.  No fevers overnight.  Tolerating tube feeds ok.  Urine appears \"cloudy\", per nursing.  No new loose stools.                   Physical Exam:      Last Vital Signs:  Temp: 99  F (37.2  C) Temp src: Axillary BP: 112/55   Heart Rate: 60 Resp: 25 SpO2: 96 % O2 Device: Mechanical Ventilator    I/O last 3 completed shifts:  In: 1892.61 [I.V.:672.61; " NG/GT:450]  Out: 625 [Urine:625]     Ventilation Mode: CMV/AC  FiO2 (%): 75 %  Rate Set (breaths/minute): 16 breaths/min  Tidal Volume Set (mL): 400 mL  PEEP (cm H2O): 10 cmH2O  Oxygen Concentration (%): 75 %  Resp: 26    GEN:  Vented and sedated, arouses to voice and opens eyes.  Following basic commands.  HEENT:  Normocephalic/atraumatic, PERRL, no scleral icterus, no nasal discharge, mouth moist,  ET tube in place.  NECK:  No clear thyromegaly of clear JVD  LA:  None clearly palpated in the cervical/clavicular area bilaterally.  CV:  irregular rate and rhythm, no loud murmur to ausc.  S1 + S2 noted, no S3 or S4.  LUNGS:  Course vented breath sounds, but not clear rales/rhonchi/wheezing auscultated bilaterally.  No costal retractions bilaterally.  Symmetric chest rise on inhalation noted.  ABD:  Slower but normo-sounding bowel sounds, soft, non-tender, mildly distended.  No rebound/guarding/rigidity.  No masses palpated.  No obvious HSM to exam.  EXT:  No edema or cyanosis bilaterally. No joint synovitis noted.  No calf-tenderness or asymmetry noted.  SKIN:  Dry to touch, no rashes or jaundice noted.  PSYCH:  Resting comfortable, not agitated.   NEURO:  No tremors at rest, as above - following basic commands.  Opening eyes to voice.       Medications:      All current medications were reviewed.         Data:      All new lab and imaging data was reviewed.   Labs:    Recent Labs  Lab 11/29/17 0540 11/28/17 0530 11/27/17  0545    141 137   POTASSIUM 2.9* 3.5 3.6   CHLORIDE 106 104 100   CO2 26 26 31   ANIONGAP 10 11 6   * 160* 128*   BUN 42* 35* 24   CR 1.13* 1.35* 1.19*   GFRESTIMATED 45* 37* 43*   GFRESTBLACK 55* 45* 52*   OLI 8.3* 8.0* 8.5       Recent Labs  Lab 11/29/17 0540 11/28/17 0530 11/25/17  0524   WBC 15.0* 15.2* 10.4   HGB 9.0* 9.4* 10.9*   HCT 28.0* 30.0* 33.9*   MCV 89 90 89    241 296     No results for input(s): DD in the last 168 hours.    Recent Labs  Lab 11/24/17  0584    AST 14   ALT 14   ALKPHOS 149   BILITOTAL 0.7     No results for input(s): INR in the last 168 hours.    Recent Labs  Lab 11/22/17  1740   TROPI <0.015     No results for input(s): TSH in the last 168 hours.  No results for input(s): COLOR, APPEARANCE, URINEGLC, URINEBILI, URINEKETONE, SG, UBLD, URINEPH, PROTEIN, UROBILINOGEN, NITRITE, LEUKEST, RBCU, WBCU in the last 168 hours.   Recent Imaging:   No results found for this or any previous visit (from the past 24 hour(s)).

## 2017-11-30 NOTE — PROGRESS NOTES
Results for ANGEL ELDER (MRN [id number]) as of 11/29/2017 18:02   Ref. Range 11/29/2017 05:40   Potassium Latest Ref Range: 3.4 - 5.3 mmol/L 2.9 (L)   Informed Dr. Ruiz of low k+, orders received.

## 2017-11-30 NOTE — PROGRESS NOTES
ICU Attending Note    I have seen and examined Kat Gomez, reviewed the patient's history, pertinent labs, vital signs, medications, physical exam, and radiographs.  The patient is critically ill by my examination and requires continued ICU monitoring and cares    Kat Gomez was admitted to the ICU with hypoxic respiratory failure.  Had opacification of left lung on chest CT as well as a right pleural effusion with associated collapse of adjacent lung  Has a history of aortic valve repair, ascending aortic aneurism, complete heart block, CAD with bypass, HTN, sleep apnea, and SVT.     Recent Events:  Intubated for respiratory failure 11/27. Requiring high levels of FiO2 and PEEP in spite of unremarkable CXR. Will get chest CT to rule out PE/significant parenchymal process. Start PT/OT    Exam:  Temp:  [98.5  F (36.9  C)-101.2  F (38.4  C)] 99.5  F (37.5  C)  Heart Rate:  [59-77] 60  Resp:  [20-34] 26  BP: ()/(43-74) 91/47  FiO2 (%):  [65 %-80 %] 80 %  SpO2:  [90 %-97 %] 95 %    Intake/Output Summary (Last 24 hours) at 11/30/17 0950  Last data filed at 11/30/17 0700   Gross per 24 hour   Intake          2285.37 ml   Output              841 ml   Net          1444.37 ml                     Ventilation Mode: CMV/AC  FiO2 (%): 80 %  Rate Set (breaths/minute): 16 breaths/min  Tidal Volume Set (mL): 400 mL  PEEP (cm H2O): 10 cmH2O  Oxygen Concentration (%): 80 %  Resp: 26      Results:  ABG     Recent Labs  Lab 11/30/17  0555 11/29/17  2140 11/28/17  0824 11/27/17  0540   PH 7.47* 7.47* 7.46* 7.42   PCO2 35 36 39 47*   PO2 84 54* 112* 57*   HCO3 25 26 28 31*     CBC    Recent Labs  Lab 11/29/17  0540 11/28/17  0530 11/25/17  0524 11/24/17  0515   WBC 15.0* 15.2* 10.4 9.1   HGB 9.0* 9.4* 10.9* 10.8*   HCT 28.0* 30.0* 33.9* 33.2*    241 296 286     BMP    Recent Labs  Lab 11/30/17 0527 11/29/17 2207 11/29/17  0540 11/28/17  0530 11/27/17  0545     --  142 141 137   POTASSIUM 3.5 3.5 2.9*  3.5 3.6   CHLORIDE 110*  --  106 104 100   CO2 26  --  26 26 31   BUN 51*  --  42* 35* 24   CR 1.08*  --  1.13* 1.35* 1.19*   *  --  163* 160* 128*     LFT    Recent Labs  Lab 11/29/17  0540 11/24/17  0515   AST 26 14   ALT 14 14   ALKPHOS 104 149   BILITOTAL 0.3 0.7   ALBUMIN 1.7* 2.0*     PancreasNo lab results found in last 7 days.  INR  Lab Results   Component Value Date    INR 1.07 07/19/2016    INR 1.11 09/30/2009    INR 1.07 09/29/2009       Current Issues:  1.  Hypoxic respiratory failure: Previous CT scan suggests a VQ mismatch as cause (pneumonia and pleural effusion). Intubation and mechanical ventilation. Keep peak plateau pressures < 30 mm Hg CT to rule out PE/ progression of parenchymal process. Remains hypoxic in spite of CXR improvement. Respiratory viral PCR pending  2.  Fluid boluses for hypotension. Earlier in week was negative almost 5 liters from admission. Dexmedetomodine and prn fentanyl for sedation and pain control.  3.  A fib/svt eliquis and metoprolol  4.  Chronic kidney disease: follow, Cr stable even with diuresis.  5.  Ankle facture: prior injury, boot    Evaluation and management time exclusive of procedures was 30 minutes critical care time including:  examination with the ICU team, discussion of the patient's condition with other physicians and members of the care team, reviewing all data related to the patient, and time utilizing the EMR for documentation of this patient's care.        Andrzej Low MD  [facility] Intensivist Service

## 2017-11-30 NOTE — PLAN OF CARE
Problem: Patient Care Overview  Goal: Plan of Care/Patient Progress Review  PT: PT eval limited, pt intubated, but not sedated, following cues, answering yes/no questions. Able to remove restraints for activity. Pt lives with son, was able to ambulate with FWW in [facility], cooking performing own cares. Will verify as able.   Discharge Planner PT   Patient plan for discharge: not stated  Current status: limited activity, desating with rolling, but is initiating motion with UEs, needing A with LEs.   Barriers to return to prior living situation: Very limited mobility at this time  Recommendations for discharge: TCU  Rationale for recommendations: Needing A with all cares, mobility.        Entered by: Alana Jackson 11/30/2017 10:24 AM

## 2017-11-30 NOTE — PROGRESS NOTES
Called regarding increased RR and continued high FiO2.   On Precedex gtt, no narcotics.  Bedside nursing thinks patient is in pain.  Will order ABG. Start patient on Fentanyl 25-50 mcg IV, Ativan 0.5-1 mg IV.    Geraldine Aguirre MD  [phone]

## 2017-11-30 NOTE — PROGRESS NOTES
CLINICAL NUTRITION SERVICES - REASSESSMENT NOTE      Recommendations Ordered by Registered Dietitian (RD):   Peptamen Intense VHP at 45 mL/hr  Tri-Vi-Sol 7 mL per pressure injury protocol     EVALUATION OF PROGRESS TOWARD GOALS/NEW FINDINGS   Enteral intake - Monitor for adequacy, tolerance, stooling, labs    Patient remains intubated and recieving TF with good tolerance started 11/27:     Type of Feeding Tube: NJ (10 fr, placed initailly 11/27, post pyloric 11/28 with x-ray confirmation)    Enteral Frequency:  Continuous    Enteral Regimen: Peptamen Intense VHP at 40 mL/hr    Total Enteral Provisions: 960 mL provides 960 kcal, 89 gm protein (2 gm per kg IBW, 1.1 g per kg actual body weight), 806 ml free H2O, 75 gm CHO and 4 gm Fiber daily. Meets < 100% of DRI's --> Certavite    Free Water Flush: 60 mL q4 hours  In last 48 hours, received an average of 820 mL (85% of goal volume)      Meds: reviewed, Precedex    Labs: Cr 1.08 (H), GFR 48 (L), BG acceptable <180    BM: 11/25 --> suppository given, team aware    Weight: 78.1 kg, down ~3.5 kg since admit    Skin per WOCN 11/29: coccyx --  1 x 0.5 x 0.1cm; Wound Base: 100% fairly dry whitish- yellow tissue, unsure if dermis- Stage 2 vs Slough- Unstageable    ASSESSED NUTRITION NEEDS (PER APPROVED PRACTICE GUIDELINES, Dosing weight: 73.8 kg for energy, 45 kg IBW for protein):  Estimated Energy Needs: 810-1040 kcals (11-14 Kcal/Kg)  Justification: obese and vented  Estimated Protein Needs: 67-90+ grams protein (1.5-2+ g pro/Kg)  Justification: hypercatabolism with critical illness and CKD  Estimated Fluid Needs: >1 mL/Kcal  Justification: maintenance    Previous Goals:   TF to meet % of estimated nutrition needs within 48 hours while NPO  Evaluation: With slow advancement, not met within 48 hours, now complete    Previous Nutrition Diagnosis:   Inadequate oral intake related to limited opportunity with intubation as evidenced by NPO diet order  Evaluation: No  change/ongoing with intubation    MALNUTRITION (Assessed 11/27)  % Weight Loss: Does not meet criteria with diuresis  % Intake:Decreased intake does not meet criteria for malnutrition   Subcutaneous Fat Loss: Mild or greater, as noted above  Muscle Loss: Mild to moderate, as noted above  Fluid Retention: Mild     Malnutrition Diagnosis: Non-Severe malnutrition  In Context of:  Chronic illness or disease with high risk for further decline    CURRENT NUTRITION DIAGNOSIS  Increased nutrient needs (protein) related to wound healing and critical illness as evidenced by stage 2 vs unstageable coccyx pressure injury, EN reliance since 11/27    INTERVENTIONS  Recommendations / Nutrition Prescription  Updated EN regimen now that propofol off:    Enteral Regimen: Peptamen Intense VHP at 45 mL/hr x 24 hours    Total Enteral Provisions: 1080 mL provides 1080 kcal, 100 gm protein (1.3 gm per kg actual body weight), 907 ml free H2O, 84 gm CHO and 4 gm Fiber daily.    Meets < 100% of DRI's, 5545 international units Vitamin A, 346 mg Vitamin C and 26 mg Zinc    Free Water Flush: standard 60 mL q4 hours with increases per MD discretion      Per pressure injury protocol, continue Certavite. Will also add Tri-Vi-Sol 7 mL for an additional 70155 international units Vitamin A, 2800 international units Vitamin D and 245 mg Vitamin C    Implementation   EN Composition, EN Schedule and Feeding Tube Flush: Entered orders to reflect regimen outlined above  Collaboration and Referral of care: Discussed patient during interdisciplinary care rounds this morning    Goals  TF at goal rate to meet % of estimated needs while NPO      MONITORING AND EVALUATION:  Enteral intake - Monitor for adequacy, tolerance, stooling, labs  Progress towards goals will be monitored and evaluated per protocol and Practice Guidelines      Barbara Sanon RDN, LD, CNSC  3rd floor/ICU: [phone]  All other floors: [phone]  Weekend/holiday:  [phone]  Office: [phone]

## 2017-11-30 NOTE — PHARMACY-VANCOMYCIN DOSING SERVICE
Pharmacy Vancomycin Initial Note  Date of Service 2017  Patient's  [id number]  88 year old, female    Indication: pna    Current estimated CrCl = Estimated Creatinine Clearance: 33.3 mL/min (based on Cr of 1.08).    Creatinine for last 3 days  2017:  5:30 AM Creatinine 1.35 mg/dL  2017:  5:40 AM Creatinine 1.13 mg/dL  2017:  5:27 AM Creatinine 1.08 mg/dL    Recent Vancomycin Level(s) for last 3 days  No results found for requested labs within last 72 hours.      Vancomycin IV Administrations (past 72 hours)      No vancomycin orders with administrations in past 72 hours.                Nephrotoxins and other renal medications (Future)    Start     Dose/Rate Route Frequency Ordered Stop    17 1400  vancomycin (VANCOCIN) 1500 mg in 0.9% NaCl 250 mL PREMIX      1,500 mg  166.7 mL/hr over 90 Minutes Intravenous EVERY 24 HOURS 17 1350      17 1100  piperacillin-tazobactam (ZOSYN) infusion 3.375 g     Comments:  Pharmacy can adjust dose based on renal function.    3.375 g  100 mL/hr over 30 Minutes Intravenous EVERY 6 HOURS 17 1014            Contrast Orders - past 72 hours (72h ago through future)    Start     Dose/Rate Route Frequency Ordered Stop    17 1145  iopamidol (ISOVUE-370) solution 500 mL      500 mL Intravenous ONCE 17 1137 17 1139                Plan:  1.  Start vancomycin  1500 mg IV q24h.   2.  Goal Trough Level: 15-20 mg/L   3.  Pharmacy will check trough levels as appropriate in 1-4 days. SP    4. Serum creatinine levels will be ordered daily for the first week of therapy and at least twice weekly for subsequent weeks.    5. [place] method utilized to dose vancomycin therapy: Method 1    [place] Andersen

## 2017-11-30 NOTE — PROGRESS NOTES
11/30/17 8071   Quick Adds   Type of Visit Initial PT Evaluation   Living Environment   Lives With child(angelo), adult  (son)   Living Arrangements house  (unclear but report of [facility] from chart review)   Living Environment Comment Per RNs, Pt lives with son. Unclear of living environment. Pt reponding to yes/no questions but unclear situation   Self-Care   Usual Activity Tolerance good   Current Activity Tolerance poor   Equipment Currently Used at Home walker, rolling   Activity/Exercise/Self-Care Comment Per RNs report from son, pt was independent with tasks in home with use of FWW. Pt was doing all own cooking at home.   Functional Level Prior   Ambulation 1-->assistive equipment   Transferring 1-->assistive equipment   Toileting 1-->assistive equipment   Fall history within last six months yes   Number of times patient has fallen within last six months 1   Prior Functional Level Comment Limited report due to intubation but able to recieve some report from RN- was using FWW, did own cooking at home. Pt did have a previous fall and currently uses CAM boot due to R ankle fracture. Pt able to report with yes/no questions that she was walking following fracture.   General Information   Onset of Illness/Injury or Date of Surgery - Date 11/22/17   Referring Physician Devante   Patient/Family Goals Statement Pt no stated goals, agreeable to minimal activity at this time   Pertinent History of Current Problem (include personal factors and/or comorbidities that impact the POC) Kat Gomez is a 88 year old female with past medical history of CAD, prior aortic valve repair and ascending aortic dissection repair, complete heart block s/p PPM, PAF (Eliquis), CKD3, LISSET not on CPAP, and diastolic CHF who was admitted on 11/22/2017 with several days of shortness of breath and fatigue and was found to have acute hypoxic respiratory failure and PNA as well as possible mild CHF exacerbation. She was transferred to the  ICU on admission due to respiratory failure requiring BiPAP. She was intubated on 11/27 due to worsening respiratory failure.   Precautions/Limitations oxygen therapy device and L/min  (intubated, but not sedated, responsive to cues)   Weight-Bearing Status - RLE (with CAM boot, currently in ankle brace)   Cognitive Status Examination   Orientation person;place   Level of Consciousness alert   Follows Commands and Answers Questions 50% of the time;able to follow single-step instructions   Personal Safety and Judgment impaired  (per RN pulling at tube at times, restrained, but easily cued)   Memory intact   Pain Assessment   Patient Currently in Pain No  (no report of pain in R ankle in supine)   Range of Motion (ROM)   ROM Comment limited R ankle DF/PF, able to perform hip/knee flexion to 90   Strength   Strength Comments 2+/5 in quads, unable to perform SLR, but responsive to cues. B UE antigravity control.   Bed Mobility   Bed Mobility Comments mod A x 2 for rolling, A x 1 more with equipment   Sensory Examination   Sensory Perception Comments WFL   General Therapy Interventions   Planned Therapy Interventions bed mobility training;gait training;strengthening;transfer training;progressive activity/exercise   Clinical Impression   Criteria for Skilled Therapeutic Intervention yes, treatment indicated   PT Diagnosis decreased functional mobility   Influenced by the following impairments decreased ROM, decreased strength, decreased respiratory status   Functional limitations due to impairments decreased transfers, bed mobility, gait   Clinical Presentation Unstable/Unpredictable   Clinical Presentation Rationale Pt respiratory status compromised severly, ankle fracture on R, weakness noted in LEs   Clinical Decision Making (Complexity) High complexity   Therapy Frequency` daily  (will watch progress closely to evaluate frequency)   Predicted Duration of Therapy Intervention (days/wks) 1 week   Anticipated Discharge  "Disposition Transitional Care Facility   Risk & Benefits of therapy have been explained Yes   Patient, Family & other staff in agreement with plan of care Yes   [facility] AM-PAC TM \"6 Clicks\"   2016, Trustees of [facility], under license to [employer].  All rights reserved.   6 Clicks Short Forms Basic Mobility Inpatient Short Form   [facility] AM-PAC  \"6 Clicks\" V.2 Basic Mobility Inpatient Short Form   1. Turning from your back to your side while in a flat bed without using bedrails? 2 - A Lot   2. Moving from lying on your back to sitting on the side of a flat bed without using bedrails? 2 - A Lot   3. Moving to and from a bed to a chair (including a wheelchair)? 1 - Total   4. Standing up from a chair using your arms (e.g., wheelchair, or bedside chair)? 1 - Total   5. To walk in hospital room? 1 - Total   6. Climbing 3-5 steps with a railing? 1 - Total   Basic Mobility Raw Score (Score out of 24.Lower scores equate to lower levels of function) 8   Total Evaluation Time   Total Evaluation Time (Minutes) 10     "

## 2017-11-30 NOTE — PROGRESS NOTES
Respiratory Therapy Note        Pt was transported to CT and back.  At the beginning of transport her SpO2 was 91% on 90%.  Increased to 100% FiO2, by end of transport SpO2 98%.    November 30, 2017 12:17 PM  Seth Chacon

## 2017-11-30 NOTE — PROGRESS NOTES
Patient continues on ventilator with current settings:    Ventilation Mode: CMV/AC  FiO2 (%): 80 %  Rate Set (breaths/minute): 16 breaths/min  Tidal Volume Set (mL): 400 mL  PEEP (cm H2O): 10 cmH2O  Oxygen Concentration (%): 80 %  Resp: 32    BS clear/diminished, suctioning small cloudy thick. Continues to receive duoneb QID. RR in high 30s at beginning of shift, MD ordered ativan and fentanyl for pain and RR now around 30. RT will continue to follow.

## 2017-11-30 NOTE — PLAN OF CARE
Problem: Patient Care Overview  Goal: Plan of Care/Patient Progress Review  Outcome: No Change  ICU End of Shift Summary.  For vital signs and complete assessments, please see documentation flowsheets.     Pertinent assessments: Pt awake and alert at the beginning of the shift with a MAXIMUS score of 0. Changed to -2 by AM after receiving Fentanyl/Ativan for increased RR. Pt able to follow commands and write on notepad when alert. Denies any pain or SOB. LS remains coarse/diminished throughout. Suctioning small amounts of thin, cloudy sputum. Temp of 100.5 at start of shift. Tele-Hub notified. Cold packs placed and monitored. Remained elevated throughout shift, but no higher then first check. Tylenol given around 0300 to see if decreased temp would help RR. Temp down to 99.5 at 0500, but RR remains unchanged. UO remains stable. No BM this shift. Tolerating tube feed. Potassium recheck at 3.5.   Major Shift Events: O2 decreased to 88% and RR high 30s when first assessed along with an elevated temp at 100.5. RT at bedside and increased FiO2. Tele-Hub notified of the above and orders placed for stat ABG with a repeat in AM, Fentanyl, and Ativan. Given medications when able with MAXIMUS score to -2 by AM. RR improved slightly to high 20s/low 30s. Desats with minimal activity to mid 80s. Takes a couple of minutes to recover.  Plan (Upcoming Events): Continue to monitor respiratory status closely and wean FiO2 as able. Await ABG results this AM. ? Need for CT repeat CT scan today. Continue antibiotics and monitor temp closely. PT/OT to see today.  Discharge/Transfer Needs: TBD    Bedside Shift Report Completed   Bedside Safety Check Completed      Right wrist and Left wrist restraints continued 11/30/2017    Clinical Justification: Pulling lines, pulling tubes, and pulling equipment  Less Restrictive Alternative: Repositioning, Disguise equipment, Alarm, Reorientation  Attending Physician Notified: Yes, Attending Physician's  Name: Low   New orders placed Yes  Length of Order: 1 Day    Pt continues to reach for tube when restraints are removed with writer at bedside so will continue restraints for safety at this time.    Alcira Chris

## 2017-11-30 NOTE — PLAN OF CARE
Problem: Patient Care Overview  Goal: Plan of Care/Patient Progress Review  Outcome: No Change  ICU End of Shift Summary.  For vital signs and complete assessments, please see documentation flowsheets.     Pertinent assessments: Nods to questions but will pull at tubes. Continues on Precedex drip at 0.7mcg  VSS; but held Metoprolol this AM d/t parameters. Lungs coarse in upper lobes, diminished at bases, O2 sats 89-96% Fio2 at 65%, scant thin sputum. GI; tube feedings at goal at 40ml/hr, no BM documented since 11/25/17; supp. Given, no impaction assessed, active bowel sounds. ; UOP is cloudy and brown-yellow in color  Major Shift Events: Fever of 101.2 axillary. Blood and urine cultures sent, sputum cultures sent earlier in day.   Plan (Upcoming Events): Wean Fio2 to keep sats at >92%, monitor temp., PT/OT in AM   Discharge/Transfer Needs: TBD     Bedside Shift Report Completed : yes  Bedside Safety Check Completed: yes

## 2017-12-01 NOTE — PROGRESS NOTES
[facility] received from 11/22/2017. Pt has been admitted to the hospital for shortness of breath, CHF exacerbation.     Presenting rhythm AS/  Battery 7.5 yrs estimated longevity  Lead measurements stable.   30 episodes of AT/AF since 9/30/2017  AT/AF burden 0.1%, pt is on Eliquis  No VHR episodes  HR histogram shows minimal variability  A paced 96%  V paced 100%    OV scheduled with Dr. Jiménez in December. Next remote PPM check scheduled in January.

## 2017-12-01 NOTE — PROGRESS NOTES
ICU Attending Note    I have seen and examined Kat Gomez, reviewed the patient's history, pertinent labs, vital signs, medications, physical exam, and radiographs.  The patient is critically ill by my examination and requires continued ICU monitoring and cares    Kat Gomez was admitted to the ICU with hypoxic respiratory failure.  Had opacification of left lung on chest CT as well as a right pleural effusion with associated collapse of adjacent lung  Has a history of aortic valve repair, ascending aortic aneurism, complete heart block, CAD with bypass, HTN, sleep apnea, and SVT.     Recent Events:  Intubated for respiratory failure 11/27. Requiring high levels of FiO2 and PEEP in spite of unremarkable CXR. Chest CT with slightly increased infiltrate in LLL, but scattered ground glass opacities. Started PT/OT. ET tube changed 11/30 for cuff leak. Vancomycin started 11/30 for worsening infiltrates and fevers. Respiratory viral PCR panel negative.    Exam:  Temp:  [98.6  F (37  C)-101.2  F (38.4  C)] 101.2  F (38.4  C)  Heart Rate:  [60-75] 60  Resp:  [17-33] 26  BP: ()/(44-91) 108/50  FiO2 (%):  [70 %-90 %] 70 %  SpO2:  [92 %-99 %] 94 %    Intake/Output Summary (Last 24 hours) at 12/01/17 0915  Last data filed at 12/01/17 0800   Gross per 24 hour   Intake          3994.94 ml   Output             2955 ml   Net          1039.94 ml                         Ventilation Mode: CMV/AC  FiO2 (%): 70 %  Rate Set (breaths/minute): 16 breaths/min  Tidal Volume Set (mL): 400 mL  PEEP (cm H2O): 10 cmH2O  Oxygen Concentration (%): 70 %  Resp: 26      Results:  ABG     Recent Labs  Lab 11/30/17  2035 11/30/17  0555 11/29/17  2140 11/28/17  0824   PH 7.46* 7.47* 7.47* 7.46*   PCO2 37 35 36 39   PO2 90 84 54* 112*   HCO3 26 25 26 28     CBC    Recent Labs  Lab 12/01/17  0547 11/29/17  0540 11/28/17  0530 11/25/17  0524   WBC 13.3* 15.0* 15.2* 10.4   HGB 8.8* 9.0* 9.4* 10.9*   HCT 27.5* 28.0* 30.0* 33.9*   PLT  247 249 241 296     BMP    Recent Labs  Lab 12/01/17  0547 12/01/17  0012 11/30/17  1714 11/30/17  0527  11/29/17  0540 11/28/17  0530   *  --   --  143  --  142 141   POTASSIUM 3.8 3.9 3.3* 3.5  < > 2.9* 3.5   CHLORIDE 109  --   --  110*  --  106 104   CO2 29  --   --  26  --  26 26   BUN 45*  --   --  51*  --  42* 35*   CR 1.01  --   --  1.08*  --  1.13* 1.35*   *  --   --  160*  --  163* 160*   < > = values in this interval not displayed.  LFT    Recent Labs  Lab 11/29/17  0540   AST 26   ALT 14   ALKPHOS 104   BILITOTAL 0.3   ALBUMIN 1.7*     PancreasNo lab results found in last 7 days.  INR  Lab Results   Component Value Date    INR 1.07 07/19/2016    INR 1.11 09/30/2009    INR 1.07 09/29/2009       Current Issues:  1.  Hypoxic respiratory failure: Previous CT scan suggests a VQ mismatch as cause (pneumonia and pleural effusion). Intubation and mechanical ventilation. Keep peak plateau pressures < 30 mm Hg CT to rule out PE/ progression of parenchymal process. Remains hypoxic in spite of CXR improvement. Respiratory viral PCR negative. Consider steroids if no improvement in next 24-48 hours  2.  Fluid boluses for hypotension. Earlier in week was negative almost 5 liters from admission. Dexmedetomodine and prn fentanyl for sedation and pain control.  3.  A fib/svt eliquis and metoprolol  4.  Chronic kidney disease: follow, Cr stable even with diuresis.  5.  Ankle facture: prior injury, boot    Evaluation and management time exclusive of procedures was 30 minutes critical care time including:  examination with the ICU team, discussion of the patient's condition with other physicians and members of the care team, reviewing all data related to the patient, and time utilizing the EMR for documentation of this patient's care.        Andrzej Low MD  [facility]ens[facility]

## 2017-12-01 NOTE — PROGRESS NOTES
[facility]  Hospitalist Progress Note  Aurelio Encinas, DO 12/01/2017    Reason for Stay (Diagnosis): Respiratory failure.         Assessment and Plan:      Summary of Stay:   Kat Gomez is a 88 year old female with past medical history of CAD, prior aortic valve repair and ascending aortic dissection repair, complete heart block s/p PPM, PAF (Eliquis), CKD3, LISSET not on CPAP, and diastolic CHF who was admitted on 11/22/2017 with several days of shortness of breath and fatigue and was found to have acute hypoxic respiratory failure and PNA as well as possible mild CHF exacerbation. She was transferred to the ICU on admission due to respiratory failure requiring BiPAP. She was intubated on 11/27 due to worsening respiratory failure.     Problem List:   1. Acute hypoxic respiratory failure.  Suspected to be due to pneumonia.  Intubated.  Continue vent support.  CT with worsening infiltrates.  Abx as below.  2. Pneumonia.  CT Chest shows worsening infiltrates.  Continue IV Zosyn, IV Vanco.    3. Acute on chronic diastolic CHF exacerbation.  Give IV Lasix 20 mg BID for two doses.  Monitor daily weight and strict I&O's.  4. A fib.  Continue Eliquis and Metoprolol.  5. GI prophylaxis.  Protonix.  6. Nutrition.  Tube feedings.  7. Hyperlipidemia.  Zocor.  8. CAD.  Zocor, Metoprolol.  9. Chronic kideny disease.  Avoid nephrotoxins as able.  Monitor.  10. Hypokalemia.  Potassium replacement protocol.  11. Hypernatremia.  Overall, fluid overloaded.  Lasix 20 mg BID for two doses.  Increase free water with tube feedings.  Monitor.  12. Recent ankle fracture.  Pain meds PRN.  DVT Prophylaxis: Eliquis.  Code Status: DNR  Discharge Dispo: TBD  Estimated Disch Date / # of Days until Disch: 3+        Interval History (Subjective):      Intubated and sedated.                  Physical Exam:      Last Vital Signs:  /59  Pulse 60  Temp 100.5  F (38.1  C) (Axillary)  Resp (!) 44  Ht 1.524 m (5')  Wt  78.3 kg (172 lb 9.9 oz)  SpO2 90%  BMI 33.71 kg/m2    Gen:  Intubated and sedated.  Eyes:  PERRL, sclera anicteric.  OP:  ET tube in place.  CV:  Regular, no murmurs.  Lung:  Crackles b/l, appears to have normal effort on vent.  Ab:  +BS, soft.  Skin:  Warm, dry to touch.  No rash.  Ext:  1+ pitting edema LE b/l.           Medications:      All current medications were reviewed with changes reflected in problem list.         Data:      All new lab and imaging data was reviewed.   Labs:    Recent Labs  Lab 12/01/17  0547   *   POTASSIUM 3.8   CHLORIDE 109   CO2 29   ANIONGAP 9   *   BUN 45*   CR 1.01   GFRESTIMATED 52*   GFRESTBLACK 62   OLI 7.8*       Recent Labs  Lab 12/01/17  0547   WBC 13.3*   HGB 8.8*   HCT 27.5*   MCV 90         Imaging:   Recent Results (from the past 24 hour(s))   XR Chest Port 1 View    Narrative    PORTABLE CHEST ONE VIEW   11/30/2017 7:38 PM     HISTORY: Acute respiratory failure.    COMPARISON: 11/28/2017.    FINDINGS: Sternotomy. ET tube with tip just above the ping. Feeding  tube directed in the stomach with tip not included in the study.  Moderate perihilar interstitial opacities consistent with CHF,  increased from 11/28/2017. Pacer with 2 cardiac leads in place.      Impression    IMPRESSION: Increased interstitial perihilar infiltrates in the  interval likely representing CHF.    AGUSTO NEWELL MD

## 2017-12-01 NOTE — PLAN OF CARE
Problem: Patient Care Overview  Goal: Plan of Care/Patient Progress Review  OT:  eval complete and treatment initiated.  Pt is an 88 year old female with past medical history of CAD, prior aortic valve repair and ascending aortic dissection repair, complete heart block s/p PPM, PAF (Eliquis), CKD3, LISSET not on CPAP, and diastolic CHF who was admitted on 11/22/2017 with several days of shortness of breath and fatigue and was found to have acute hypoxic respiratory failure and PNA as well as possible mild CHF exacerbation. She was transferred to the ICU on admission due to respiratory failure requiring BiPAP. She was intubated on 11/27 due to worsening respiratory failure.  Per report, Pt was independent in ADLs, IADLs and mobility using a 4WW PTA.    Discharge Planner OT   Patient plan for discharge: Pt unable to identify a plan secondary to being intubated  Current status: Pt alert, able to answer yes/no appropriately and follow 75% simple one step commands.  She participated in grooming/hygiene with max A given hand over hand guidance and cueing to initiate and stay on task.  She tolerated B UE AAROM for shoulder flex/extension, elbow flex/extension, pro/supination, wrist flex/extension, finger flex/extension x 10 reps each with VSS.  Barriers to return to prior living situation: Pt limited by weakness and respiratory status.  Recommendations for discharge: TCU  Rationale for recommendations: Pt would benefit from continued OT to maximize safety and independence in ADLs and functional transfers with improved strength and endurance.       Entered by: Leila Nichole 12/01/2017 12:11 AM

## 2017-12-01 NOTE — PLAN OF CARE
Problem: Patient Care Overview  Goal: Plan of Care/Patient Progress Review  OT:  Pt remains intubated, but is now sedated and too unstable for participation in OT at this time.  Plan to cancel per RN and continue as appropriate.

## 2017-12-01 NOTE — PLAN OF CARE
"Problem: Patient Care Overview  Goal: Plan of Care/Patient Progress Review  Outcome: No Change  ICU End of Shift Summary.  For vital signs and complete assessments, please see documentation flowsheets.     Pertinent assessments: Pt remains fully vented throughout the shift with a MAXIMUS score of 0 to -2. Remains on max dose of Precedex and given 2 doses of 50 mcg of Fentanyl to help with patient's comfort. Patient appeared to be able to rest comfortably after doses given with RR decreasing to low to mid 20s. Satting low to mid 90s on 70% FiO2. LS more clear tonight with slight coarseness - improved from yesterday. Suctioning only scant amount of cloudy, red-streaked sputum, which occurred airway exchange. T max of 100.3 axillary. Tele remains paced. Increased amount of urine after Lasix given. One large, loose BM tonight. Potassium replacement finished and rechecked at 3.9.  Major Shift Events: Patient's ET tube replaced at the beginning of the shift due to air leak (see previous RN's note). Repeat ABGs ordered per Tele-Hub with recommendation to decrease O2 on vent after resulted per Dr. Santoyo. No other changes made. Decreased from 80% to 70% with patient able to maintain her sats > 90%. Only desatted to 89% at the lowest with turns.  Plan (Upcoming Events): Continue to monitor respiratory status closely and wean FiO2 as able. Watch patient closely to prevent her from \"tonguing\" tube out or biting it. Continue antibiotics and monitor temperature. Continue PT/OT.   Discharge/Transfer Needs: TBD    Bedside Shift Report Completed   Bedside Safety Check Completed    Right wrist and Left wrist restraints continued 12/1/2017    Clinical Justification: Pulling lines, pulling tubes, and pulling equipment  Less Restrictive Alternative: Repositioning, Disguise equipment, Pain management, Alarm, De-escalation, Reorientation  Attending Physician Notified: Yes, Attending Physician's Name: Dr. Encinas   New orders placed " Yes  Length of Order: 1 Day    Pt continues to reach for face if unrestrained with writer in the room. Unsure if she would be safe to leave tube in place. Will continue restraints at this time.    Alcira Chris

## 2017-12-01 NOTE — PROGRESS NOTES
Respiratory Therapy    ABG drawn from patient's right radial artery with no complications. FiO2 80% on mechanical ventilator.      Kimberly Bhatti RRT  12/1/2017

## 2017-12-01 NOTE — ANESTHESIA CARE TRANSFER NOTE
Patient: Kat Gomez    * No procedures listed *    Diagnosis: * No pre-op diagnosis entered *  Diagnosis Additional Information: No value filed.    Anesthesia Type:   No value filed.     Note:  Airway :ETT  Patient transferred to:ICU  Comments: ETT exchange at bedside in ICU.  ICU RN present for procedure.  ICU Handoff: Call for PAUSE to initiate/utilize ICU HANDOFF, Identified Patient, Identified Responsible Provider, Reviewed the Pertinent Medical History, Discussed Surgical Course, Reviewed Intra-OP Anesthesia Management and Issues during Anesthesia, Set Expectations for Post Procedure Period and Allowed Opportunity for Questions and Acknowledgement of Understanding      Vitals: (Last set prior to Anesthesia Care Transfer)              Electronically Signed By: TOO Morales CRNA  November 30, 2017  8:28 PM

## 2017-12-01 NOTE — PROGRESS NOTES
Patient continues on ventilator with current settings:    Ventilation Mode: CMV/AC  FiO2 (%): 70 %  Rate Set (breaths/minute): 16 breaths/min  Tidal Volume Set (mL): 400 mL  PEEP (cm H2O): 10 cmH2O  Oxygen Concentration (%): 70 %  Resp: 27    At beginning of shift RT was called to assess tube leak, patient had bit through the tubes cuff line and unable to repair as too far back in mouth. Anesthesia was called to replace ETT; replaced with a 7.0 and secured at 23 at the lip. BS clear and diminished, sometimes is slightly coarse/crackles. Suctioning scant to small white secretions. Continues to receive duoneb QID. RT will continue to follow.

## 2017-12-01 NOTE — PLAN OF CARE
Problem: Patient Care Overview  Goal: Plan of Care/Patient Progress Review  Outcome: No Change  ICU End of Shift Summary.  For vital signs and complete assessments, please see documentation flowsheets.     Pertinent assessments: Alert and calm. Pt able to move all extremities, follow commands and can communicate via pen and paper. Cardiac: MAP greater than 65, BP soft but stable, 10sec run of SVT in afternoon. Resp: Fi02 at 80, , PEEP 10, Lung sound throughout are coarse with intermittent wheezing. Sputum scant and clear. O2 sats 91-97% throughout shift.  GI: tube feeding at goal, pt tolerating. : UOP increased throughout shift, arenas still in place per need of strict I&O discussed with intensivist. Skin: cont. With mepilex to sacrum.   Major Shift Events: CT scan of lungs to rule out PE, Dr. Encinas and Dr. Low notified of results. BNP lab ordered and lasix given. K+ 3.3, protocol initiated. Vanco. Order added. Pt with ETT cuff leak x2. RT notified, bite block in place but pt tongues tube and was able to bite through balloon inflation tube. Tele Hub and anesthesia notified. ETT replaced with airway exchange kit.    Plan (Upcoming Events): Pt on Precedex, continues to tongue tube, sedation concerns discused with tele hub. No new orders at this time.   Discharge/Transfer Needs: Remains in ICU    Bedside Shift Report Completed : Yes   Bedside Safety Check Completed: yes

## 2017-12-01 NOTE — PROGRESS NOTES
CLINICAL NUTRITION SERVICES BRIEF NOTE  See RD note dated 11/30 for full re-assessment.     Pt continues on EN to meet full needs, tolerating well.     New Findings -   Labs Reviewed, Na 147 (H) appears to be trending up.   Water Flushes 60 ml q 4 hrs, No IVF running.     Implementation -   Increase water flushes to 120 mL q 4 hrs (720 mL) for total 1526 ml daily free water. Orders entered, discussed during interdisciplinary rounds.     Will continue to monitor.     Sania Trevizo RD, LD  3rd floor/ICU: [phone]  All other floors: [phone]  Weekend/holiday: [phone]  Office: [phone]

## 2017-12-01 NOTE — PROGRESS NOTES
11/30/17 1430   Quick Adds   Type of Visit Initial Occupational Therapy Evaluation   Living Environment   Lives With child(angelo), adult  (Son)   Living Arrangements house   Home Accessibility stairs within home   Transportation Available car;family or friend will provide   Living Environment Comment Per RN, Pt lives with Son in multi-level home.   Self-Care   Dominant Hand right   Usual Activity Tolerance good   Current Activity Tolerance poor   Equipment Currently Used at Home walker, rolling   Activity/Exercise/Self-Care Comment Per report, Pt was independent in ADLs, homemaking, cooking and mobility using a 4WW PTA.   Functional Level Prior   Ambulation 1-->assistive equipment   Transferring 1-->assistive equipment   Toileting 1-->assistive equipment   Bathing 1-->assistive equipment   Dressing 0-->independent   Eating 0-->independent   Communication 0-->understands/communicates without difficulty   Swallowing 2-->difficulty swallowing liquids   Cognition 0 - no cognition issues reported   Fall history within last six months yes   Number of times patient has fallen within last six months 1   Which of the above functional risks had a recent onset or change? ambulation;transferring;toileting;bathing   Prior Functional Level Comment Pt uses a R CAM boot secondary to ankle fracture.       Present no   General Information   Onset of Illness/Injury or Date of Surgery - Date 11/22/17   Referring Physician Dr. Andrzej Low   Patient/Family Goals Statement Return to home   Additional Occupational Profile Info/Pertinent History of Current Problem Pt is an 88 year old female with past medical history of CAD, prior aortic valve repair and ascending aortic dissection repair, complete heart block s/p PPM, PAF (Eliquis), CKD3, LISSET not on CPAP, and diastolic CHF who was admitted on 11/22/2017 with several days of shortness of breath and fatigue and was found to have acute hypoxic respiratory failure and  PNA as well as possible mild CHF exacerbation. She was transferred to the ICU on admission due to respiratory failure requiring BiPAP. She was intubated on 11/27 due to worsening respiratory failure.   Precautions/Limitations fall precautions;oxygen therapy device and L/min  (Pt currently intubated on full vent support)   Cognitive Status Examination   Orientation person   Level of Consciousness alert   Able to Follow Commands mild impairment   Attention Sustained attention impaired   Cognitive Comment Pt able to answer yes/no appropriately.   Pain Assessment   Patient Currently in Pain No   Range of Motion (ROM)   ROM Quick Adds No deficits were identified   ROM Comment B UE AAROM WFL   Strength   Manual Muscle Testing Quick Adds Other   Strength Comments B UE strength grossly 3/5   Hand Strength   Hand Strength Comments weak   Muscle Tone Assessment   Muscle Tone Quick Adds No deficits were identified   Coordination   Upper Extremity Coordination Left UE impaired;Right UE impaired   Mobility   Bed Mobility Comments mod-max A x 2   Grooming   Level of [place]: Grooming maximum assist (25% patients effort)   Physical Assist/Nonphysical Assist: Grooming set-up required;verbal cues   Instrumental Activities of Daily Living (IADL)   Previous Responsibilities meal prep;housekeeping;laundry;shopping;medication management   Activities of Daily Living Analysis   Impairments Contributing to Impaired Activities of Daily Living balance impaired;strength decreased;coordination impaired   General Therapy Interventions   Planned Therapy Interventions ADL retraining;transfer training;strengthening   Clinical Impression   Criteria for Skilled Therapeutic Interventions Met yes, treatment indicated   OT Diagnosis decreased ADL performance   Influenced by the following impairments decreased strength and impaired respiratory status   Assessment of Occupational Performance 5 or more Performance Deficits   Identified Performance  "Deficits Pt with decreased ability to manage dressing, bathing, toileting, cooking, homemaking   Clinical Decision Making (Complexity) Low complexity   Therapy Frequency 3 times/wk   Predicted Duration of Therapy Intervention (days/wks) 1 week   Anticipated Discharge Disposition Transitional Care Facility   Risks and Benefits of Treatment have been explained. Yes   Patient, Family & other staff in agreement with plan of care Yes   [facility] TM \"6 Clicks\"   2016, Trustees of [facility], under license to [employer].  All rights reserved.   6 Clicks Short Forms Daily Activity Inpatient Short Form   [facility] AM-PAC  \"6 Clicks\" Daily Activity Inpatient Short Form   1. Putting on and taking off regular lower body clothing? 1 - Total   2. Bathing (including washing, rinsing, drying)? 1 - Total   3. Toileting, which includes using toilet, bedpan or urinal? 1 - Total   4. Putting on and taking off regular upper body clothing? 2 - A Lot   5. Taking care of personal grooming such as brushing teeth? 2 - A Lot   6. Eating meals? 1 - Total   Daily Activity Raw Score (Score out of 24.Lower scores equate to lower levels of function) 8   Total Evaluation Time   Total Evaluation Time (Minutes) 8     "

## 2017-12-01 NOTE — PROGRESS NOTES
Patient remained on vent settings as followed during the shift:  Ventilation Mode: CMV/AC  FiO2 (%): 80 %  Rate Set (breaths/minute): 16 breaths/min  Tidal Volume Set (mL): 400 mL  PEEP (cm H2O): 10 cmH2O  Oxygen Concentration (%): 80 %  Resp: 22      Breath sounds are coarse, suctioning out clear secretions. Scheduled nebs given inline with the vent. SPO2 90-94% on 80% FIO2. Will continue to monitor patient.     Rosy Huynh , RT  November 30, 2017 6:20 PM

## 2017-12-01 NOTE — PROGRESS NOTES
Respiratory Therapy    Patient remains intubated and on mechanical ventilator settings as follows:  Ventilation Mode: CMV/AC  FiO2 (%): 100 %  Rate Set (breaths/minute): 16 breaths/min  Tidal Volume Set (mL): 350 mL  PEEP (cm H2O): 10 cmH2O  Oxygen Concentration (%): 60 %  Resp: 13    PEEP was increased to +12, and FiO2 was increased to 100% after ABG was drawn. MD was updated. Patient remains very tachypneic with RR 40's-50's, breath sounds are coarse/diminished. Suctioning out small, white, red streaked, thick secretions via ETT. Will continue to assess and monitor.    Recent Labs  Lab 12/01/17  1445 11/30/17  2035 11/30/17  0555 11/29/17  2140   PH 7.46* 7.46* 7.47* 7.47*   PCO2 36 37 35 36   PO2 58* 90 84 54*   HCO3 26 26 25 26   O2PER Ventilator Ventilator Room Air 70%     Kimberly Bhatti RRT  12/1/2017

## 2017-12-02 NOTE — PROGRESS NOTES
Pt is more agitated on Dex and fentanyl gtts. Will try propofol gtts, if hypotensive will consider versed gtts.    Godfrey Roth MD.  Pulmonary and Critical Care.  12/01/2017  8:15 PM       Due to persistent hypoxia was started on Flolan.    Godfrey Roth MD.  Asst Professor,  Pulmonary and Critical Care.  12/02/2017

## 2017-12-02 NOTE — PROGRESS NOTES
RT- arterial blood gas drawn from right radial with patient on 100% FIO2.    Vidya Ji, RT  12/2/2017 10:38 AM

## 2017-12-02 NOTE — PLAN OF CARE
Problem: Patient Care Overview  Goal: Plan of Care/Patient Progress Review  Outcome: Declining  ICU End of Shift Summary.  For vital signs and complete assessments, please see documentation flowsheets.     Pertinent assessments: RASS -1, follows commands nods to yes/no questions, writes on communication board for needs, Tele-paced, LS coarse/dim on full vent support Fi02 100%, increased 02 demands, increased RR >45 bpm, GI/-diarrhea rectal tube placed sample for c-diff sent, Isolation started, TF at goal rage, increased Free H20 to 120ml Q4hrs, Loyd ~87 ml/hr, coccyx dsg CDI.  Major Shift Events: Increased RR, Fentanyl added, ABG done  Plan (Upcoming Events): monitor resp status, continue iv abx, possible steroids starting tomorrow, monitor labs.  Discharge/Transfer Needs: TBD    Bedside Shift Report Completed : yes  Bedside Safety Check Completed: yes

## 2017-12-02 NOTE — PROGRESS NOTES
RT- patient remains on current/ordered vent settings:    Ventilation Mode: CMV/AC  FiO2 (%): 90 %  Rate Set (breaths/minute): 26 breaths/min  Tidal Volume Set (mL): 320 mL  PEEP (cm H2O): 15 cmH2O  Oxygen Concentration (%): 95 %  Resp: 25    Patient is currently paralyzed with ETT secure and patent. Breath sounds mildly coarse with ETT suctioned for smll, thick/cloudy/tan secretions.    Patient continues to get inhaled Flolan and Duoneb in line as ordered.    BVM at bedside with Peep valve. Will continue to monitor patient.    Vidya Ji, RT  12/2/2017 5:11 PM

## 2017-12-02 NOTE — PLAN OF CARE
Problem: Restraint, Nonbehavioral (Nonviolent)  Goal: Rationale and Justification  Right wrist and Left wrist restraints continued 12/1/2017    Clinical Justification: Pulling lines, pulling tubes, and pulling equipment  Less Restrictive Alternative: Repositioning, Re-evaluate equipment, Disguise equipment, Pain management, Alarm, Reorientation  Attending Physician Notified: Yes, Attending Physician's Name: Dr. Simpson   New orders placed Yes  Length of Order: 1 Day      Ken Casiano

## 2017-12-02 NOTE — PROGRESS NOTES
RT note:    Patient remains on mechanical ventilation. Settings are CMV/AC R 16, tidal volume 250, peep of 12 and 100% FIO2. Upon arrival to room, patient was very agitated with respiratory rate in the high 30's to occasional low 40's. Patient had irregular breathing patterns (kuzzmauhl breathing). Saturations were in the low 90's to high 80's. Breath sounds were clear pre and post nebulizer treatment. Discussed with RN and agreed on a different kind of sedative to help patient feel more comfortable. Tele-hub MD was called. Flolan was also ordered.    Will continue to follow and monitor.    Faye Barnett, RRT

## 2017-12-02 NOTE — PLAN OF CARE
Problem: Restraint, Nonbehavioral (Nonviolent)  Goal: Rationale and Justification  Right wrist and Left wrist restraints continued 12/2/2017    Clinical Justification: Pulling lines, pulling tubes, and pulling equipment  Less Restrictive Alternative: Repositioning, Re-evaluate equipment, Disguise equipment, De-escalation, Reorientation, Alarm  Attending Physician Notified: Yes, Attending Physician's Name: Dr. Simpson    New orders placed Yes  Length of Order: 1 Day      Ken Casiano

## 2017-12-02 NOTE — PROVIDER NOTIFICATION
12/02/17 0800   Significant Event   Significant Event Other (see comments)  (Po2 37)     Dr. Simpson aware and at bedside

## 2017-12-02 NOTE — PROGRESS NOTES
ICU Attending Note    I have seen and examined Kat Gomez, reviewed the patient's history, pertinent labs, vital signs, medications, physical exam, and radiographs.  The patient is critically ill by my examination and requires continued ICU monitoring and cares    Kat Gomez was admitted to the ICU with hypoxic respiratory failure.  Had opacification of left lung on chest CT as well as a right pleural effusion with associated collapse of adjacent lung  Has a history of aortic valve repair, ascending aortic aneurism, complete heart block, CAD with bypass, HTN, sleep apnea, and SVT.     Recent Events:  Intubated for respiratory failure 11/27. Requiring high levels of FiO2 and PEEP in spite of unremarkable CXR. Chest CT with slightly increased infiltrate in LLL, but scattered ground glass opacities. Started PT/OT. ET tube changed 11/30 for cuff leak. Vancomycin started 11/30 for worsening infiltrates and fevers. Respiratory viral PCR panel negative. Worsening oxygenation overnight on 12/2, so flolan started.  Exam:  Temp:  [98.9  F (37.2  C)-101.7  F (38.7  C)] 101.7  F (38.7  C)  Heart Rate:  [58-84] 60  Resp:  [11-48] 24  BP: ()/(44-87) 101/52  FiO2 (%):  [60 %-100 %] 95 %  SpO2:  [84 %-100 %] 95 %    Intake/Output Summary (Last 24 hours) at 12/02/17 0936  Last data filed at 12/02/17 0700   Gross per 24 hour   Intake          2627.91 ml   Output             1290 ml   Net          1337.91 ml             Ventilation Mode: CMV/AC  FiO2 (%): 95 %  Rate Set (breaths/minute): 16 breaths/min  Tidal Volume Set (mL): 350 mL  PEEP (cm H2O): 12 cmH2O  Oxygen Concentration (%): 95 %  Resp: 24      Results:  ABG on 12/2 likely VBG as O2 sats >95% on pulse oximetry     Recent Labs  Lab 12/02/17  0835 12/01/17  1445 11/30/17  2035 11/30/17  0555   PH 7.35 7.46* 7.46* 7.47*   PCO2 50* 36 37 35   PO2 37* 58* 90 84   HCO3 28 26 26 25     CBC    Recent Labs  Lab 12/02/17  0515 12/01/17  0547 11/29/17  0540  11/28/17  0530   WBC 14.4* 13.3* 15.0* 15.2*   HGB 8.6* 8.8* 9.0* 9.4*   HCT 28.3* 27.5* 28.0* 30.0*    247 249 241     BMP    Recent Labs  Lab 12/02/17  0515 12/01/17  0547 12/01/17  0012 11/30/17  1714 11/30/17  0527  11/29/17  0540   * 147*  --   --  143  --  142   POTASSIUM 3.5 3.8 3.9 3.3* 3.5  < > 2.9*   CHLORIDE 109 109  --   --  110*  --  106   CO2 28 29  --   --  26  --  26   BUN 49* 45*  --   --  51*  --  42*   CR 1.05* 1.01  --   --  1.08*  --  1.13*   * 160*  --   --  160*  --  163*   < > = values in this interval not displayed.  LFT    Recent Labs  Lab 11/29/17  0540   AST 26   ALT 14   ALKPHOS 104   BILITOTAL 0.3   ALBUMIN 1.7*     PancreasNo lab results found in last 7 days.  INR  Lab Results   Component Value Date    INR 1.07 07/19/2016    INR 1.11 09/30/2009    INR 1.07 09/29/2009       Current Issues:  1.  Hypoxic respiratory failure: Previous CT scan suggests a VQ mismatch as cause (pneumonia and pleural effusion). Intubation and mechanical ventilation. Keep peak plateau pressures < 30 mm Hg CT to rule out PE/ progression of parenchymal process. Remains hypoxic in spite of CXR improvement. Respiratory viral PCR negative. With deteriorating clinical status and no sign of infection, will give 3 day trial of high dose solumedrol, 1 gm/day. If no improvement, transition to comfort care would be appropriate.   2.  Fluid boluses for hypotension. Earlier in week was negative almost 5 liters from admission. Dexmedetomodine and prn fentanyl for sedation and pain control.  3.  A fib/svt eliquis and metoprolol  4.  Chronic kidney disease: follow, Cr stable even with diuresis.  5.  Ankle facture: prior injury, boot4  Evaluation and management time exclusive of procedures was 40 minutes critical care time including:  examination with the ICU team, discussion of the patient's condition with other physicians and members of the care team, reviewing all data related to the patient, and time  utilizing the EMR for documentation of this patient's care.        Andrzej Low MD  [facility] Intensivist Service    Addendum:    Patient with continued desaturation in spite of full strength flolan, 100% FiO2 and 15 PEEP. Will try proning after increasing sedation and adding a paralytic.

## 2017-12-02 NOTE — PROGRESS NOTES
After reviewing patients chart there is documentation of an attempted PICC placement on 11/27/2018 where PICC was unable to be threaded in to SVC.  Patient also has a Right sided pacemaker that may be causing the blockage. She does have a left side midline in place that is working. Staff RN will update MD who is already aware that a femoral line may be needed.

## 2017-12-02 NOTE — PROGRESS NOTES
X-Cover    Contacted by RN regarding agitation on vent. Patient is already on fentanyl and precedex. Ordered versed 2 mg one time, and advised to contact tele-ICU to further assist with management of vent sedation.

## 2017-12-02 NOTE — PROGRESS NOTES
[facility]  Hospitalist Progress Note  Aurelio Encinas, DO 12/02/2017    Reason for Stay (Diagnosis): Respiratory failure         Assessment and Plan:      Summary of Stay:   Kat Gomez is a 88 year old female with past medical history of CAD, prior aortic valve repair and ascending aortic dissection repair, complete heart block s/p PPM, PAF (Eliquis), CKD3, LISSET not on CPAP, and diastolic CHF who was admitted on 11/22/2017 with several days of shortness of breath and fatigue and was found to have acute hypoxic respiratory failure and PNA as well as possible mild CHF exacerbation. She was transferred to the ICU on admission due to respiratory failure requiring BiPAP. She was intubated on 11/27 due to worsening respiratory failure.      Problem List:   1. Acute hypoxic respiratory failure.  Suspected to be due to pneumonia.  Intubated.  Continue vent support.  CT with worsening infiltrates.  Abx as below.  Currently, on IV Fentanyl, Propofol, and Precedex for sedation.  BP low.  Stop Metoprolol for now.  2. Pneumonia.  CT Chest shows worsening infiltrates.  Continue IV Zosyn, IV Vanco.    3. Acute on chronic diastolic CHF exacerbation.  BP low.  Hold further Lasix for now.  Monitor daily weight and strict I&O's.  4. A fib.  Continue Eliquis.  Hold Metoprolol for low BP.  5. GI prophylaxis.  Protonix.  6. Nutrition.  Tube feedings.  7. Hyperlipidemia.  Zocor.  8. CAD.  Zocor.  9. Chronic kideny disease.  Avoid nephrotoxins as able.  Monitor.  10. Hypokalemia.  Potassium replacement protocol.  11. Hypernatremia.  Improved.  Monitor.  12. Recent ankle fracture.  Pain meds PRN.  DVT Prophylaxis: Eliquis.  Code Status: DNR  Discharge Dispo: TBD  Estimated Disch Date / # of Days until Disch: 3+        Interval History (Subjective):      Intubated, Sedated.                  Physical Exam:      Last Vital Signs:  /52  Pulse 60  Temp 101.7  F (38.7  C)  Resp 24  Ht 1.524 m (5')  Wt 78.8 kg  (173 lb 11.6 oz)  SpO2 96%  BMI 33.93 kg/m2    Gen:  Intubated, sedated.  Eyes:  PERRL, sclera anicteric.  OP:  ET tube in place.  CV:  Regular, no murmurs.  Lung:  Coarse b/l.  Ab:  +BS, soft.  Skin:  Warm, dry to touch.  No rash.  Ext:  1+ pitting edema LE b/l.           Medications:      All current medications were reviewed with changes reflected in problem list.         Data:      All new lab and imaging data was reviewed.   Labs:    Recent Labs  Lab 12/02/17  0515   *   POTASSIUM 3.5   CHLORIDE 109   CO2 28   ANIONGAP 8   *   BUN 49*   CR 1.05*   GFRESTIMATED 49*   GFRESTBLACK 60*   OLI 7.8*       Recent Labs  Lab 12/02/17  0515   WBC 14.4*   HGB 8.6*   HCT 28.3*   MCV 92         Imaging:   No results found for this or any previous visit (from the past 24 hour(s)).

## 2017-12-02 NOTE — PROGRESS NOTES
RT- arterial blood gas drawn from right radial artery with patient on 95% FIO2.    Vidya Ji, RT  12/2/2017 8:37 AM

## 2017-12-02 NOTE — PLAN OF CARE
ICU End of Shift Summary.  For vital signs and complete assessments, please see documentation flowsheets.     Pertinent assessments: Patient currently -1 RASS. Tmax 101.7 this AM. Lungs diminished. Patient on ventilator plus flolan. RR mid 20s. Heart is regular, Vpaced. Bps stable. Low urine output. Rectal tube in place, CDIFF negative. TF infusing at goal.   Major Shift Events: Patient restless and overbreathing the vent at beginning of shift RR 45-50. Telehub consulted for additional sedation. Propofol added. Telehub consulted again at 0100 for patient desaturating to 83-85% with no improvement from suctioning or repositioning. Flolan added. Patient improved after Flolan started with 02 saturations increasing to mid 90s.    Plan (Upcoming Events): Continue to monitor patient.   Discharge/Transfer Needs: Pending.     Bedside Shift Report Completed :   Bedside Safety Check Completed:

## 2017-12-03 NOTE — PROGRESS NOTES
RT note:    Patient continues to be on full mechanical ventilator support. Settings are CMV/AC rate of 26, tidal volume 320, peep of 15, and 90% FIO2. On these settings, patient's saturations are in the mid to high 90's. Breath sounds are clear pre and post nebulizer treatments. Suctioned scant amount of cloudy thick secretions.   Patient remains on continuous Flolan. Per MD on day shift, patient's cuff pressure on ETT remains 50 throughout the night.  Will continue to follow and monitor.    Faye Barnett, RRT

## 2017-12-03 NOTE — PLAN OF CARE
Problem: Patient Care Overview  Goal: Plan of Care/Patient Progress Review  Outcome: No Change  ICU End of Shift Summary.  For vital signs and complete assessments, please see documentation flowsheets.     Pertinent assessments: RASS -4, Tele-Paced, LS coarse on full vent FIO2 90%, medically paralyzed, GI/-TF at goal, rectal tube with 800 ml total shift-bag changed, arenas minimal  ml total shift, Coccyx dsg changed per POC.  Major Shift Events: Paralytic started, cuff leak [id number] by Dr. Simpson, PICC unable to obtain due to scar tissue and PPM   Plan (Upcoming Events): monitor TOF, resp status, monitor labs, continue abx,   Discharge/Transfer Needs: TBD    Bedside Shift Report Completed : yes  Bedside Safety Check Completed: yes

## 2017-12-03 NOTE — PROGRESS NOTES
[facility]  Hospitalist Progress Note  Aurelio Encinas, DO 12/03/2017    Reason for Stay (Diagnosis): Respiratory failure.         Assessment and Plan:      Summary of Stay:   Kat Gomez is a 88 year old female with past medical history of CAD, prior aortic valve repair and ascending aortic dissection repair, complete heart block s/p PPM, PAF (Eliquis), CKD3, LISSET not on CPAP, and diastolic CHF who was admitted on 11/22/2017 with several days of shortness of breath and fatigue and was found to have acute hypoxic respiratory failure and PNA as well as possible mild CHF exacerbation. She was transferred to the ICU on admission due to respiratory failure requiring BiPAP. She was intubated on 11/27 due to worsening respiratory failure.      Problem List:   1. Acute hypoxic respiratory failure.  Suspected to be due to pneumonia.  Intubated.  Continue vent support.  CT with worsening infiltrates.  Abx and steroids as below.  Currently, on IV Propofol, Midazolam, and Vercuronium for sedation.    2. Pneumonia.  CT Chest shows worsening infiltrates.  Continue IV Zosyn.  IV Vanco stopped 12/2.  Started on IV Solumedrol on 12/2.    3. Acute on chronic diastolic CHF exacerbation.  Hold further Lasix for now.  Monitor daily weight and strict I&O's.  4. A fib.  Continue Eliquis.  Hold Metoprolol for low BP (Low BP likely due to Propofol).  5. GI prophylaxis.  Protonix.  6. Hyperglycemia.  Change Novolog SSI to Insulin drip.  7. Nutrition.  Tube feedings.  8. Hyperlipidemia.  Zocor.  9. CAD.  Zocor, Eliquis.  10. Chronic kideny disease.  Creatinine stable at 1.02.  Avoid nephrotoxins as able.  Monitor.  11. Hypokalemia.  Potassium replacement protocol.  12. Hypernatremia.  Recheck BMP on 12/4.  13. Recent ankle fracture.  Pain meds PRN.  DVT Prophylaxis: Eliquis.  Code Status: DNR  Discharge Dispo: TBD  Estimated Disch Date / # of Days until Disch: 3+           Interval History (Subjective):       Intubated, Sedated.                  Physical Exam:      Last Vital Signs:  /50 (BP Location: Left leg)  Pulse 60  Temp 98.8  F (37.1  C)  Resp 25  Ht 1.524 m (5')  Wt 78.6 kg (173 lb 4.5 oz)  SpO2 99%  BMI 33.84 kg/m2    Gen:  Intubated, sedated.  Eyes:  PERRL, sclera anicteric.  OP:  MMM, no lesions.  Neck:  Supple.  CV:  Regular, no murmurs.  Lung:  Coarse lung sounds b/l.  Ab:  +BS, soft.  Skin:  Warm, dry to touch.  No rash.  Ext:  1+ pitting edema LE b/l.           Medications:      All current medications were reviewed with changes reflected in problem list.         Data:      All new lab and imaging data was reviewed.   Labs:    Recent Labs  Lab 12/03/17  0550 12/02/17  0515   NA  --  145*   POTASSIUM  --  3.5   CHLORIDE  --  109   CO2  --  28   ANIONGAP  --  8   GLC  --  137*   BUN  --  49*   CR 1.02 1.05*   GFRESTIMATED 51* 49*   GFRESTBLACK 62 60*   OLI  --  7.8*       Recent Labs  Lab 12/02/17  0515   WBC 14.4*   HGB 8.6*   HCT 28.3*   MCV 92         Imaging:   Recent Results (from the past 24 hour(s))   XR Chest Port 1 View    Narrative    XR CHEST PORT 1 VW 12/2/2017 9:10 AM    HISTORY: Worsening hypoxia;       Impression    IMPRESSION: ET tube above the ping. Distal end of feeding tube not  on film.. Diffuse by basilar pulmonary infiltrates. Similar findings  seen on 11/30/2017.    MATI TAO MD

## 2017-12-03 NOTE — PLAN OF CARE
Problem: Patient Care Overview  Goal: Plan of Care/Patient Progress Review  OT: Pt continues to be intubated and sedated; not medically appropriate for occupational therapy at this time.  OT will complete orders.  Please re-order when patient is extubated and able to participate.    .Occupational Therapy Discharge Summary    Reason for therapy discharge:    Change in medical status.    Progress towards therapy goal(s). See goals on Care Plan in [facility] electronic health record for goal details.  Goals not met.  Barriers to achieving goals:   Pt intubated and sedated..    Therapy recommendation(s):    Will await new orders as appropriate.

## 2017-12-03 NOTE — PLAN OF CARE
Right wrist and Left wrist restraints discontinued at 8:00 PM on 12/2/2017.     Patient is on Neuromuscular Blockade gtt. Restraints not necessary at this time.       Michelle Mcdaniel

## 2017-12-03 NOTE — PLAN OF CARE
ICU End of Shift Summary.  For vital signs and complete assessments, please see documentation flowsheets.     Pertinent assessments: Afebrile, VSS. Patient is fully sedated and medically paralyzed. TOF showing 0 twitches. Telehub aware. Paralytic infusing at lowest titratable rate. Lungs clear, diminished. Fio2 85% patient 02 saturations upper 90s. Heart is regular, bps stable. TF at goal. Rectal tube in place, liquid stool output. Loyd in place, low urine output.   Major Shift Events: TOF showing decreased activity at 0400 check, no twitching at all. Telehub notified, no new orders. Continue Vec infusion.  Plan (Upcoming Events): Continue to monitor.   Discharge/Transfer Needs: Pending     Bedside Shift Report Completed :   Bedside Safety Check Completed:

## 2017-12-03 NOTE — PLAN OF CARE
Problem: Patient Care Overview  Goal: Plan of Care/Patient Progress Review    PT: Patient is currently intubated and sedated, not medically appropriate for physical therapy.  Physical therapy will complete orders, please re-consult when patient is extubated and able to participate.  Jazz Trammell DPT  Pager [phone]    Physical Therapy Discharge Summary    Reason for therapy discharge:    Patient now intubated and sedated    Progress towards therapy goal(s). See goals on Care Plan in [facility] electronic health record for goal details.  Goals not met.  Barriers to achieving goals:   unable to participate in therapy now intubated and sedated.    Therapy recommendation(s):    Please rec-consult physical therapy when patient is medically appropriate and able to particpate

## 2017-12-03 NOTE — PROGRESS NOTES
ICU Attending Note    I have seen and examined Kat Gomez, reviewed the patient's history, pertinent labs, vital signs, medications, physical exam, and radiographs.  The patient is critically ill by my examination and requires continued ICU monitoring and cares    Kat Gomez was admitted to the ICU with hypoxic respiratory failure.  Had opacification of left lung on chest CT as well as a right pleural effusion with associated collapse of adjacent lung  Has a history of aortic valve repair, ascending aortic aneurism, complete heart block, CAD with bypass, HTN, sleep apnea, and SVT.     Recent Events:  Intubated for respiratory failure 11/27. Requiring high levels of FiO2 and PEEP in spite of unremarkable CXR. Chest CT with slightly increased infiltrate in LLL, but scattered ground glass opacities. Started PT/OT. ET tube changed 11/30 for cuff leak. Vancomycin started 11/30 for worsening infiltrates and fevers. Respiratory viral PCR panel negative. Worsening oxygenation overnight on 12/2, so flolan started. Cuff inadequately inflated 12/2, and patient with desaturations on maximum therapy. Inflated cuff further, increased PEEP and saturations improved. FiO2 on paralytics and 15 PEEP now 96-98%.  Exam:  Temp:  [98.8  F (37.1  C)-99.5  F (37.5  C)] 98.8  F (37.1  C)  Heart Rate:  [59-69] 60  Resp:  [13-35] 25  BP: ()/(29-62) 119/53  FiO2 (%):  [70 %-100 %] 70 %  SpO2:  [85 %-100 %] 96 %    Intake/Output Summary (Last 24 hours) at 12/03/17 0915  Last data filed at 12/03/17 0757   Gross per 24 hour   Intake          2746.34 ml   Output             2565 ml   Net           181.34 ml                 Ventilation Mode: CMV/AC  FiO2 (%): 70 %  Rate Set (breaths/minute): 26 breaths/min  Tidal Volume Set (mL): 320 mL  PEEP (cm H2O): 15 cmH2O  Oxygen Concentration (%): 80 %  Resp: 25      Results:  ABG on 12/2 likely VBG as O2 sats >95% on pulse oximetry     Recent Labs  Lab 12/02/17  1034 12/02/17  9286  12/01/17  1445 11/30/17  2035   PH 7.37 7.35 7.46* 7.46*   PCO2 46* 50* 36 37   PO2 57* 37* 58* 90   HCO3 27 28 26 26     CBC    Recent Labs  Lab 12/02/17  0515 12/01/17  0547 11/29/17  0540 11/28/17  0530   WBC 14.4* 13.3* 15.0* 15.2*   HGB 8.6* 8.8* 9.0* 9.4*   HCT 28.3* 27.5* 28.0* 30.0*    247 249 241     BMP    Recent Labs  Lab 12/03/17  0550 12/02/17  0515 12/01/17  0547 12/01/17  0012 11/30/17  1714 11/30/17  0527  11/29/17  0540   NA  --  145* 147*  --   --  143  --  142   POTASSIUM  --  3.5 3.8 3.9 3.3* 3.5  < > 2.9*   CHLORIDE  --  109 109  --   --  110*  --  106   CO2  --  28 29  --   --  26  --  26   BUN  --  49* 45*  --   --  51*  --  42*   CR 1.02 1.05* 1.01  --   --  1.08*  --  1.13*   GLC  --  137* 160*  --   --  160*  --  163*   < > = values in this interval not displayed.  LFT    Recent Labs  Lab 11/29/17  0540   AST 26   ALT 14   ALKPHOS 104   BILITOTAL 0.3   ALBUMIN 1.7*     PancreasNo lab results found in last 7 days.  INR  Lab Results   Component Value Date    INR 1.07 07/19/2016    INR 1.11 09/30/2009    INR 1.07 09/29/2009       Current Issues:  1.  Hypoxic respiratory failure: Previous CT scan suggests a VQ mismatch as cause (pneumonia and pleural effusion). Intubation and mechanical ventilation. Keep peak plateau pressures < 30 mm Hg CT to rule out PE/ progression of parenchymal process. Remains hypoxic in spite of CXR improvement. Respiratory viral PCR negative. With deteriorating clinical status and no sign of infection, will give 3 day trial of high dose solumedrol, 1 gm/day. Decrease FiO2 to 60% or less before stopping paralytics or decreasing PEEP/Flolan. If no improvement, transition to comfort care would be appropriate.   2.  Fluid boluses for hypotension. Earlier in week was negative almost 5 liters from admission. Dexmedetomodine and prn fentanyl for sedation and pain control.  3.  A fib/svt eliquis and metoprolol  4.  Chronic kidney disease: follow, Cr stable even with  diuresis.  5.  Ankle facture: prior injury, boot4  Evaluation and management time exclusive of procedures was 40 minutes critical care time including:  examination with the ICU team, discussion of the patient's condition with other physicians and members of the care team, reviewing all data related to the patient, and time utilizing the EMR for documentation of this patient's care.        Andrzej Low MD  [facility] Intensivist Service

## 2017-12-04 NOTE — PROGRESS NOTES
[facility]  Hospitalist Progress Note  David Kennedy MD 12/04/2017    Reason for Stay (Diagnosis): Respiratory failure.         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 She was transferred to the ICU on admission due to respiratory failure requiring BiPAP. She was intubated on 11/27 due to worsening respiratory failure.  CT scan on 11/30 did not show PE but did show worsening patchy groundglass infiltrates.  She has been treated for pneumonia as well as IV diuretics and continued to require moderate to high vent support to the point that flolan was added and there was consideration of proning on 12/2.  She was treated with empiric high dose IV steroids and has had some improvement over the past couple days from a respiratory standpoint.      Problem List:   1. Acute hypoxic respiratory failure.  Suspected to be due to pneumonia.  Intubated.  Continue vent support.   Abx and steroids as below.  Working on goals of care w/ family as reportedly she may not want a trach and is DNR.  Somewhat prolonged intubation already (intubated on 11/27).  Appreciate intensivist, palliative care input.    2. Pneumonia.  CT Chest showed worsening infiltrates 11/30.  Continue IV Zosyn.  IV Vanco stopped 12/2.  Started on IV Solumedrol on 12/2 empirically (high dose, received 1 gram x 2 days)  --vent management/sedation as per intensivist.    3. Acute on chronic diastolic CHF exacerbation.  Previously treated with a course of IV diuretisc.  Hold further Lasix for now.  Monitor  daily weight and strict I&O's.    4. A fib.  Continue Eliquis.  Hold Metoprolol for low BP (Low BP likely due to Propofol).    5. GI prophylaxis.  Protonix.    6. Hyperglycemia.  Changed Novolog SSI to Insulin drip while on steroids.  Now off IV solu-medrol, will keep insulin drip on for rapid adjustment while insulin requirements are changing to avoid hypoglycemia.    7. FEN: On  Tube feedings.  Appreciate RD input.  Check and replace K, mag as needed, adjust free water flushes pending Na trend.    8. Hyperlipidemia.  Zocor.    9. CAD.  Zocor, Eliquis.    10. Chronic kideny disease.  Creatinine stable at ~1  Avoid nephrotoxins as able.  Monitor.    11. Recent ankle fracture.  Pain meds PRN.  12. Anemia: monitor.  No recent signs of blood loss.  13. Diarrhea: rectal tube in place.  Adding more fiber, checking c. Dif.  ?due to tube feeds.  14.  Hx of cerebellar ataxia: reportedly uses a cane at baseline.    DVT Prophylaxis: Eliquis.  Code Status: DNR  Discharge Dispo: TBD  Estimated Disch Date / # of Days until Disch: 3+           Interval History (Subjective):      Intubated, Sedated.  Weaning O2  Off steroids as of today  Discussed w/ palliative care  No acute events  Diarrhea: rectal tube in place.  Adding more fiber, checking c. Dif.                   Physical Exam:      Last Vital Signs:  /55  Pulse 60  Temp 98.6  F (37  C)  Resp 14  Ht 1.524 m (5')  Wt 80.3 kg (177 lb 0.5 oz)  SpO2 93%  BMI 34.57 kg/m2    Gen:  Intubated, sedated.  Eyes:  PERRL, sclera anicteric.  OP:  MMM, no lesions.  Neck:  Supple.  CV:  Regular, no murmurs.  Lung:  Coarse lung sounds b/l.  Ab:  +BS, soft.  Skin:  Warm, dry to touch.  No rash.  Ext:  1+ pitting edema LE b/l.           Medications:      All current medications were reviewed with changes reflected in problem list.         Data:      All new lab and imaging data was reviewed.   Labs:    Recent Labs  Lab 12/04/17  0500      POTASSIUM 3.8   CHLORIDE 111*   CO2  27   ANIONGAP 6   *   BUN 62*   CR 0.98   GFRESTIMATED 53*   GFRESTBLACK 64   OLI 7.6*       Recent Labs  Lab 12/04/17  0500   WBC 12.2*   HGB 8.1*   HCT 25.6*   MCV 90         Imaging:   Recent Results (from the past 24 hour(s))   XR Chest Port 1 View    Narrative    CHEST ONE VIEW PORTABLE  12/4/2017 10:24 AM     HISTORY: Hypoxia.    COMPARISON: December 2, 2017      Impression    IMPRESSION: Endotracheal tube is again identified proximal to the  ping. Esophagogastric tube courses below the level of the diaphragm,  though extends out of field-of-view below the diaphragm. Right  hemithorax dual-lead pacemaker again identified and unchanged. Shallow  inspiration. Scattered bilateral pulmonary opacities, basilar  predominant. Suspect lower lobe opacities have slightly improved in  the left lower lobe, though perhaps minimally worsened in the right  lower lobe. Upper lobe opacities are less apparent relative to lower  lobe, though slightly progressed since previous exam. No pneumothorax.  Small, if any, pleural effusion.    ROSANNE HOUSE MD

## 2017-12-04 NOTE — CONSULTS
[facility]    Palliative Care Consultation   Text Page    Date of Admission:  11/22/2017    Assessment & Plan   Kat Gomez is a 89 year old female who was admitted on 11/22/2017. I was asked to see the patient for goals of care.    Recommendations:  1. Dyspnea - pt remains on the ventilator. Sedation change from diazepam to precedex gtt, and fentanyl gtt currently at 150 mcg/hr.  2. Diarrhea - dietician consulted to add fiber to TF. Nursing to remove rectal tube if no perirectal wounds present.  3. Pain - discomfort due to ventilation and icu cares. Pt with recent R ankle fx 11/2/2017. Continue with fentanyl gtt as per Intensivist and hospitalist. Pt has a brace for her R ankle if she becomes ambulatory. Acetaminophen 650 mg PO/per feeding tube q 4 hours prn pain or fever.    Goal of Care: DNR. Currently intubated with feeding tube in place. Restorative. Nito Gomez agrees to be the point person for the adult children for communication. Jim tells me that the children understand that pt may not make it out of the hospital, but per the most recent conversation with the health care team, in which the patient was able to participate, her goals are restorative.     Disease Process/es & Symptoms:  Kat Gomez is a 89 year old patient admitted with symptoms of shortness of breath, cough, fatigue. She has been treated for acute hypoxic respiratory failure, acute on chronic CHF, CAP, bilat pleural effusion, A fib and nonsustained vtach, CAD, ARF/CKD, hypokalemia, hypernatremia.  Cardiac echo 12/3/17 significant for mild left ventricular hypertrophy, est. EF 55-60%, basilar and mid-inferior hypokinesia, mild to moderate mitral valve regurgitation, mild to moderate tricuspid valve regurgitation, dilated IVC, elevated r ventricular systolic pressure and decreased systolic pressure. Comparison to previous study, pulmonary artery pressures are higher and MR and TR are more severe.    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  The Patient has moved to ICU from ED and patient care unit for higher level of care, assistance with all ADLS and intubation 11/27/17..    The following symptoms are noted by patient as concerning to her quality of life. Per nursing and family, as pt is currently intubated.  Pain   Dyspnea    Psychosocial/Spiritual Needs:  Pt is Restorationist. Adult children are supportive of pt.  Oriented to Spiritual Health and Social Work Services as part of Palliative Care team. Consultation placed for  to follow. Consultation placed for  to follow.    Decision-Making & Goals of Care:  Discussion/counseling today about goals of care/decisions:   12/4/2017 met with son Jim over the phone. Introduced myself and palliative care.   He states that the family feels very informed and up to date from the health care team. He tells me that several days ago when pt was able to participate, they had a conference with the doctors who explained pt's medical condition and made recommendations for her care, and the pt was able to write OK to the care plan.  He tells me that the pt is 89 and has lived a good life. They hope that she will get better but also know that pt may not survive this hospitalization. She would not want to be kept alive with many tubes, equipment and other cares needed to live. Right now she is improving, although it has been up and down with her condition since she has been here.  Reviewed current plan of care and symptom management with Jim. He agrees to be the point person for the care team to communicate with his family as he is always available by phone. His sister Deanne is here at [facility] but sometimes is working as nurse in [place] and  ESTEVAN Miller is recovering from current hospitalization.  Brothers are in [place] and [place].     Jim is agreeable to  consult for pt. He hopes that they will be able to have pt return to live with him after discharge.    Patient has decision-making capacity Unreliable  Patient has a Health Care Agent named in a legal Advance Directive document dated 12/1/2015. See name(s) and contact information in Health Care Agent/Legal Guardian section below.  Name: Nito Mccormick Barrerarenaemoristori, Relationship: son, Phone(s): [phone].  Name: Asad Gunn Patricia, Relationship: son, Phone(s): [phone].  Name: Jayson Wadsworth Patricia, Relationship: son, Phone(s): [phone]; c): [phone].  Name: Deanne Encinas, Relationship: daughter, Phone(s): [phone].  Name: Kristel Miller Barrerarafal, Relationship: daughter, Phone(s): [phone].    Patient has a completed health care directive available in the chart (Y/N): Y  There is no POLST (Physician orders for life-sustaining treatment) form on file for this patient  Code Status: Do not resuscitate     Findings & plan of care discussed with: Dr. Kennedy, bedside nurse, ICU Intensivist  Follow-up plan from palliative team: Will continue to follow this pt for symptom management and support for pt and family with goals of care.  Thank you for involving us in the patient's care.     Sunshine GAGE, CNS  Pain Management and Palliative Care  [facility]  Pgr: [phone]    Time Spent on this Encounter   I spent  55 minutes in assessment of the patient and discussion with the patient and family. Another 45 minutes in review of chart, documentation and discussion with the health care team.  Face to face with pt 10:30-10:50am. Phone call to mayra Fernandez 11:30AM-12:05PM    Reason for Consult   Reason for consult: I was asked by Dr. David Kennedy to evaluate this patient for Goals of care.    Primary Care Physician   Kelli Del Toro    Chief  Complaint   SOB, fatigue, cough    History is obtained from the electronic health record and patient's son    History of Present Illness   Kat Gomez is a 89 year old female who presents with 2 day history of progressive SOB, fatigue and cough from her clinic in [place]. Her son reported that pt's breathing had been more noisy. She was found to be hypoxic on admission requiring oxygen at 4 L. Her review was negative for URI and pt reported she had her flu shot this year. Pt slipped and fell 3 weeks ago and fractured her R tibia. Her son bought her a brace that she has been wearing. Pt with hx of afib a,d 100% paced rhythm due to heart block. She is on chronic anticoagulation.    Since her admission, the pt has developed acute hypoxic respiratory failure requiring bipap therapy, transfer to ICU and intubation on 11/27/2017.     When I see pt, she is sedated on ventilator with FIo2 of 55% and PEEP of 15 with RR of 26. VSS. 100% paced rhythm.    Past Medical History   I have reviewed this patient's medical history and updated it with pertinent information if needed.   Past Medical History:   Diagnosis Date     Adjustment disorder with depressed mood 9/25/2013     Aortic valve disorders     AVR 2003     Ascending aortic dissection (H)     s/p repair and resuspension of her native AV     Complete heart block (H) 7/19/2016     Coronary artery disease      Hyperlipidaemia      Hypertension      LISSET (obstructive sleep apnea)      Paroxysmal supraventricular tachycardia (H)      Strabismus      Syncope        Past Surgical History   I have reviewed this patient's surgical history and updated it with pertinent information if needed.  Past Surgical History:   Procedure Laterality Date     CATARACT IOL, RT/LT       CORONARY ANGIOGRAPHY ADULT ORDER      2003     CORONARY ARTERY BYPASS      SVG to LAD and SVG to RCA     CYSTOCELE REPAIR       ENDOVASCULAR REPAIR, INFRARENAL ABDOMINAL AORTIC ANEURYSM/DISSECTION; MODULAR  BIFURCATED PROSTHESIS      Emergent repair of aorta and resuspension of aortic valve with two vessel bypass in 2003     FRACTURE TX, HIP RT/LT      Fracture TX Hip RT/LT     IMPLANT PACEMAKER  7/2016    dual chamber pacemaker via right subclavian vein without complication     REPLACE VALVE AORTIC      Bioprosthetic valve in 2003       Prior to Admission Medications   Prior to Admission Medications   Prescriptions Last Dose Informant Patient Reported? Taking?   ASPIRIN NOT PRESCRIBED (INTENTIONAL) na  Yes No   Sig: Please choose reason not prescribed, below   Cranberry 125 MG TABS Unknown  Yes No   Magnesium Oxide (MAGOX 400 PO) Unknown at Unknown time  Yes No   NIFEdipine ER osmotic (PROCARDIA XL) 60 MG TB24 11/22/2017 at Unknown time  No Yes   Sig: TAKE 1 TABLET DAILY   Nystatin (NYSTOP) 482688 UNIT/GM POWD Unknown at Unknown time  No No   Sig: Apply to affected area 2x daily   ORDER FOR DME na  No No   Sig: Equipment being ordered: Electric Wheelchair for home use.   Pomegranate, Pun[place] granatum, (POMEGRANATE PO) Unknown at Unknown time  Yes No   VITAMIN D, CHOLECALCIFEROL, PO Unknown at Unknown time  Yes No   Sig: Take 2,000 Units by mouth daily   acetaminophen (TYLENOL) 325 MG tablet Unknown at Unknown time  No No   Sig: Take 2 tablets (650 mg) by mouth every 4 hours as needed for mild pain   amantadine HCl 100 MG TABS 11/22/2017 at Unknown time  No Yes   Sig: One pill in am   apixaban ANTICOAGULANT (ELIQUIS) 2.5 MG tablet 11/22/2017 at Unknown time  No Yes   Sig: Take 1 tablet (2.5 mg) by mouth 2 times daily   calcium carbonate-vitamin D 600-400 MG-UNIT CHEW Unknown at Unknown time  Yes No   clobetasol (TEMOVATE) 0.05 % cream Unknown at Unknown time  No No   Sig: Apply sparingly to affected area once daily as needed.  Do not apply to face.   gabapentin (NEURONTIN) 300 MG capsule 11/21/2017 at Unknown time  No Yes   Sig: Take 1 capsule (300 mg) by mouth At Bedtime   hydrochlorothiazide 12.5 MG TABS tablet  "11/22/2017 at Unknown time  No Yes   Sig: Take 1 tablet (12.5 mg) by mouth daily   metoprolol (LOPRESSOR) 25 MG tablet 11/22/2017 at x1  No Yes   Sig: Take 1 tablet (25 mg) by mouth 2 times daily   order for DME na  No No   Sig: Wear boot   simvastatin (ZOCOR) 40 MG tablet 11/21/2017 at Unknown time  No Yes   Sig: TAKE 1 TABLET AT BEDTIME   trospium (SANCTURA XR) 60 MG CP24 24 hr capsule 11/22/2017 at Unknown time  No Yes   Sig: Take 1 capsule (60 mg) by mouth every morning      Facility-Administered Medications: None     Allergies   No Known Allergies    Social History   I have updated and reviewed the following Social History Narrative:   Social History     Social History Narrative    , lives alone in own home.  Moved back here in 2003.  Retired from home care.       Living situation: Pt lives with her son Jim and family.   Family system: Pt has been  for 4 years. Has 3 sons and 2 dtrs. 2 children are nurses.  Functional status (needs help with ADLs or IADLs): PTA pt was fairly independent with meals, washing clothes and ADLS. Used a walker. More recently son Jim notes that pt did not go out as much.  Employment/education: Retired. 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  Use of community resources: None.  Activities/interests: Loves to read, read her willy.  History of substance use/abuse: No per son.  Advent affiliation: [employer]  Involvement in [employer]: member  Impact of illness on patient: \"pt has lived a good life\" if she gets better that is great, but son does not think pt would want to live with lots of devices, restrictions, equipment long term.    Family History   I have reviewed this patient's family history and updated it with pertinent information if needed.   Family History   Problem Relation Age of Onset     CEREBROVASCULAR DISEASE Mother      Coronary Artery Disease Mother      Hyperlipidemia Mother  "     Prostate Cancer Father      Hyperlipidemia Father      DIABETES Sister      Neurologic Disorder Sister      cerebellar ataxia     GASTROINTESTINAL DISEASE Sister      autoimmune hepatitis     Coronary Artery Disease Son      Breast Cancer Son      Colon Cancer Son      DIABETES Daughter      Breast Cancer Daughter        Review of Systems   Review of systems not obtained due to patient factors - intubation and sedation    Palliative Symptom Review (0=no symptom/no concern, 1=mild, 2=moderate, 3=severe):      Pain: GRAHAM   Hx of recent ankle fx, bilat hip fx's.      Fatigue: GRAHAM.      Nausea: GRAHAM      Constipation: 0-none      Diarrhea: 3-severe        Depressive Symptoms: graham        Anxiety: GRAHAM      Drowsiness: GRAHAM      Poor Appetite: GRAHAM - tube feeding      Shortness of Breath: 2-moderate      Insomnia: GRAHAM            Overall (0 good/no concerns, 3 very poor):  3    Physical Exam   Temp:  [98.4  F (36.9  C)-99.3  F (37.4  C)] 99.1  F (37.3  C)  Heart Rate:  [60-80] 60  Resp:  [0-28] 14  BP: ()/(39-62) 114/61  FiO2 (%):  [55 %-70 %] 55 %  SpO2:  [87 %-100 %] 96 %  177 lbs .47 oz  GEN:  Sedated. No purposeful movement to voice, noxious stimuli, appears comfortable and breathing is synchronous with the ventilator  HEENT:  Normocephalic/atraumatic, no scleral icterus, no nasal discharge, mouth moist.  CV:  100% paced rhythm. S1, S2; no murmurs or other irregularities noted.   +3 DP/PT pulses bilatererally; Very slight edema BLE.  RESP:  Coarse lung sounds to auscultation bilaterally without wheezing/retractions.  Symmetric chest rise on inhalation noted.  Normal respiratory effort.  ABD:  Rounded, soft, non-tender/non-distended.  +BS. Rectal tube with liquid brown stool.  EXT:  Edema & pulses as noted above.  CMS intact x 4.     M/S:   Non-Tender to palpation - sedated   SKIN:  Dry to touch, no exanthems noted in the visualized areas.  Nursing reports dressing on coccyx.  NEURO: Sedated. PEERL +2/+2.  Psych:   Sedated.    Delirium Screen/CAM:  GRAHAM due to sedation.    Data   Results for orders placed or performed during the hospital encounter of 17 (from the past 24 hour(s))   Glucose by meter   Result Value Ref Range    Glucose 200 (H) 70 - 99 mg/dL   Echocardiogram Complete    Narrative    [id number]  [id number]  [id number]  [id number]^KALI^JOSEPH^[facility]  Echocardiography Laboratory  [address] [place] [address]        Name: ANGEL ELDER  MRN: [id number]  : 1928  Study Date: 2017 09:16 AM  Age: 89 yrs  Gender: Female  Patient Location: [facility]  Reason For Study: Other, Please Specify in Comments  Ordering Physician: JOSEPH BASS  Referring Physician: Kelli Del Toro  Performed By: Viral Che RDCS     BSA: 1.8 m2  Height: 60 in  Weight: 173 lb  HR: 60  BP: 102/43 mmHg  _____________________________________________________________________________  __        Procedure  Complete Portable Echo Adult. Complete Portable Bubble Echo Adult.  _____________________________________________________________________________  __        Interpretation Summary     The left ventricle is normal in size.  There is mild concentric left ventricular hypertrophy.  Left ventricular systolic function is normal.  The visual ejection fraction is estimated at 55-60%.  Basal and mid inferior hypokinesis.  There is mild to moderate (1-2+) mitral regurgitation.  There is mild to moderate (1-2+) tricuspid regurgitation.  Dilated IVC (>2.5cm) with <50% respiratory collapse; right atrial pressure is  estimated at 15-20mmHg.  Right ventricular systolic pressure is elevated, consistent with severe  pulmonary hypertension.  Mildly decreased right ventricular systolic function     Since the previous study, the pulmonary artery pressures are now much higher  and MR and TR have increased in  severity.  _____________________________________________________________________________  __        Left Ventricle  The left ventricle is normal in size. There is mild concentric left  ventricular hypertrophy. Left ventricular systolic function is normal. The  visual ejection fraction is estimated at 55-60%. E by E prime ratio is greater  than 15, that likely suggests increased left ventricular filling pressures.  Basal and mid inferior hypokinesis.     Right Ventricle  The right ventricle is normal size. Mildly decreased right ventricular  systolic function. There is a pacemaker lead in the right ventricle.     Atria  The left atrium is moderately dilated. Right atrial size is normal. Intact  atrial septum.     Mitral Valve  There is mild to moderate (1-2+) mitral regurgitation.        Tricuspid Valve  There is mild to moderate (1-2+) tricuspid regurgitation. The right  ventricular systolic pressure is approximated at 47.7 mmHg plus the right  atrial pressure. Dilated IVC (>2.5cm) with <50% respiratory collapse; right  atrial pressure is estimated at 15-20mmHg. Right ventricular systolic pressure  is elevated, consistent with severe pulmonary hypertension.     Aortic Valve  The aortic valve is normal in structure and function. No aortic regurgitation  is present. No aortic stenosis is present.     Pulmonic Valve  The pulmonic valve is not well seen, but is grossly normal.     Vessels  The aortic root is normal size. The IVC is normal in size and reactivity with  respiration, suggesting normal central venous pressure.     Pericardium  There is no pericardial effusion.        Rhythm  Sinus rhythm was noted.  _____________________________________________________________________________  __  MMode/2D Measurements & Calculations  IVSd: 1.4 cm     LVIDd: 4.8 cm  LVIDs: 3.5 cm  LVPWd: 1.2 cm  FS: 28.4 %  EDV(Teich): 109.8 ml  ESV(Teich): 49.8 ml  LV mass(C)d: 251.2 grams  LV mass(C)dI: 143.1 grams/m2  Ao root diam: 3.2  cm  LA dimension: 5.2 cm  LA/Ao: 1.6  LA Volume (BP): 88.5 ml  LA Volume Index (BP): 50.3 ml/m2  RWT: 0.52           Doppler Measurements & Calculations  MV E max georgi: 114.5 cm/sec  MV A max georgi: 124.7 cm/sec  MV E/A: 0.92  MV dec time: 0.26 sec  TR max georgi: 345.3 cm/sec  TR max P.7 mmHg  E/E' av.2  Lateral E/e': 27.7  Medial E/e': 20.6           _____________________________________________________________________________  __           Report approved by: Terry Buenrostro 2017 11:54 AM      Blood gas venous with oxyhemoglobin (PCU Collect TBD)   Result Value Ref Range    Ph Venous 7.38 7.32 - 7.43 pH    PCO2 Venous 45 40 - 50 mm Hg    PO2 Venous 45 25 - 47 mm Hg    Bicarbonate Venous 27 21 - 28 mmol/L    FIO2 60%     Oxyhemoglobin Venous 80 %    Base Excess Venous 1.3 mmol/L   Glucose by meter   Result Value Ref Range    Glucose 217 (H) 70 - 99 mg/dL   Glucose by meter   Result Value Ref Range    Glucose 204 (H) 70 - 99 mg/dL   Glucose by meter   Result Value Ref Range    Glucose 188 (H) 70 - 99 mg/dL   Glucose by meter   Result Value Ref Range    Glucose 202 (H) 70 - 99 mg/dL   Glucose by meter   Result Value Ref Range    Glucose 179 (H) 70 - 99 mg/dL   Glucose by meter   Result Value Ref Range    Glucose 153 (H) 70 - 99 mg/dL   Glucose by meter   Result Value Ref Range    Glucose 160 (H) 70 - 99 mg/dL   Glucose by meter   Result Value Ref Range    Glucose 148 (H) 70 - 99 mg/dL   Glucose by meter   Result Value Ref Range    Glucose 140 (H) 70 - 99 mg/dL   Glucose by meter   Result Value Ref Range    Glucose 132 (H) 70 - 99 mg/dL   Glucose by meter   Result Value Ref Range    Glucose 132 (H) 70 - 99 mg/dL   Glucose by meter   Result Value Ref Range    Glucose 137 (H) 70 - 99 mg/dL   Basic metabolic panel   Result Value Ref Range    Sodium 145 (H) 133 - 144 mmol/L    Potassium 2.8 (L) 3.4 - 5.3 mmol/L    Chloride 109 94 - 109 mmol/L    Carbon Dioxide 28 20 - 32 mmol/L    Anion Gap 8 3 - 14 mmol/L     Glucose 158 (H) 70 - 99 mg/dL    Urea Nitrogen 60 (H) 7 - 30 mg/dL    Creatinine 1.04 0.52 - 1.04 mg/dL    GFR Estimate 50 (L) >60 mL/min/1.7m2    GFR Estimate If Black 60 (L) >60 mL/min/1.7m2    Calcium 7.6 (L) 8.5 - 10.1 mg/dL   Phosphorus   Result Value Ref Range    Phosphorus 2.3 (L) 2.5 - 4.5 mg/dL   Magnesium (AM Draw)   Result Value Ref Range    Magnesium 2.7 (H) 1.6 - 2.3 mg/dL   Glucose by meter   Result Value Ref Range    Glucose 149 (H) 70 - 99 mg/dL   Calcium, ionized whole blood (1200)   Result Value Ref Range    Calcium Ionized Whole Blood 4.3 (L) 4.4 - 5.2 mg/dL   Glucose by meter   Result Value Ref Range    Glucose 146 (H) 70 - 99 mg/dL   Glucose by meter   Result Value Ref Range    Glucose 166 (H) 70 - 99 mg/dL   Glucose by meter   Result Value Ref Range    Glucose 160 (H) 70 - 99 mg/dL   Glucose by meter   Result Value Ref Range    Glucose 158 (H) 70 - 99 mg/dL   Glucose by meter   Result Value Ref Range    Glucose 155 (H) 70 - 99 mg/dL   Glucose by meter   Result Value Ref Range    Glucose 134 (H) 70 - 99 mg/dL   Basic metabolic panel   Result Value Ref Range    Sodium 144 133 - 144 mmol/L    Potassium 3.8 3.4 - 5.3 mmol/L    Chloride 111 (H) 94 - 109 mmol/L    Carbon Dioxide 27 20 - 32 mmol/L    Anion Gap 6 3 - 14 mmol/L    Glucose 129 (H) 70 - 99 mg/dL    Urea Nitrogen 62 (H) 7 - 30 mg/dL    Creatinine 0.98 0.52 - 1.04 mg/dL    GFR Estimate 53 (L) >60 mL/min/1.7m2    GFR Estimate If Black 64 >60 mL/min/1.7m2    Calcium 7.6 (L) 8.5 - 10.1 mg/dL   Magnesium   Result Value Ref Range    Magnesium 2.7 (H) 1.6 - 2.3 mg/dL   Phosphorus   Result Value Ref Range    Phosphorus 2.8 2.5 - 4.5 mg/dL   Prealbumin   Result Value Ref Range    Prealbumin 12 (L) 15 - 45 mg/dL   CRP inflammation   Result Value Ref Range    CRP Inflammation 82.6 (H) 0.0 - 8.0 mg/L   CBC with platelets   Result Value Ref Range    WBC 12.2 (H) 4.0 - 11.0 10e9/L    RBC Count 2.84 (L) 3.8 - 5.2 10e12/L    Hemoglobin 8.1 (L) 11.7 - 15.7  g/dL    Hematocrit 25.6 (L) 35.0 - 47.0 %    MCV 90 78 - 100 fl    MCH 28.5 26.5 - 33.0 pg    MCHC 31.6 31.5 - 36.5 g/dL    RDW 14.2 10.0 - 15.0 %    Platelet Count 245 150 - 450 10e9/L   Glucose by meter   Result Value Ref Range    Glucose 125 (H) 70 - 99 mg/dL   Glucose by meter   Result Value Ref Range    Glucose 126 (H) 70 - 99 mg/dL   Glucose by meter   Result Value Ref Range    Glucose 115 (H) 70 - 99 mg/dL   Glucose by meter   Result Value Ref Range    Glucose 126 (H) 70 - 99 mg/dL         Order   CT Chest Pulmonary Embolism w Contrast [[id number]] (Order [id number])   Exam Information   Exam Date Exam Time Accession # Performing Department Results    11/30/17 11:53 AM [id number] [facility] Radiology    [employer] Interactive Report and InfoRx   View the interactive report   PACS Images   Show images for CT Chest Pulmonary Embolism w Contrast   Study Result   CT CHEST PULMONARY EMBOLISM WITH CONTRAST  11/30/2017 11:53 AM     HISTORY:  Unexplained hypoxia on ventilator.     TECHNIQUE: Scans obtained from the apices through the diaphragm with  IV contrast. 71 mL Isovue-370 injected. Radiation dose for this scan  was reduced using automated exposure control, adjustment of the mA  and/or kV according to patient size, or iterative reconstruction  technique.     COMPARISON:  CT chest 11/22/2017. CT chest 9/13/2017.     FINDINGS:  Again identified is a type A thoracic aortic dissection  extending from the proximal ascending thoracic aorta, through the  arch, and through the descending thoracic aorta into the upper  abdominal aorta. The overall configuration appears stable. Aneurysm of  the ascending thoracic aorta distally is stable measuring 5.2 cm,  series 4 image 51. 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 Proximal descending thoracic aortic aneurysm is stable  measuring 4.1 cm, image 50. Distal descending thoracic aortic aneurysm  is stable measuring 3.1 cm, image 79. No evidence  for pulmonary  embolism. Small bibasilar pleural fluid again noted. This is just  slightly increased in volume on the left. Diffuse bilateral patchy  groundglass airspace opacities have increased within the upper to mid  lungs. Consolidation has increased in density at the left lower lobe.     Groundglass opacities also increased at the right lung base.     ET tube tip lies above the level of the ping. Right chest pacemaker  noted. Sternotomy changes. Upper abdomen images show cholelithiasis.  Nasogastric tube at least within the distal stomach.         IMPRESSION:  1. Stable configuration of type A thoracic aortic dissection. Stable  sizes of the multilevel thoracic aortic aneurysm.  2. Increased diffuse patchy groundglass airspace disease. Increased  consolidation at the left lung base. Findings may represent  progression of pulmonary edema and/or pneumonia.  3. Small bilateral pleural fluid, slightly larger on the left. This is  stable on the right.     RITESH REARDON MD

## 2017-12-04 NOTE — PROGRESS NOTES
"SPIRITUAL HEALTH SERVICES Progress Note  [facility] ICU    I saw Kat in ICU per consult.  Kat is intubated and her eyes were closed during the visit.  I explained to Kat who I was and talked to her about the things from our first visit together.   I read aloud scripture and sang \"Abide with Me\" to Kat.    I closed our visit with prayer.    [facility] will continue to be available per Pt/Family/Staff request.    Stefanie Reeder Intern  [phone]  "

## 2017-12-04 NOTE — PROGRESS NOTES
Kat Gomez is a 89 year old female who was admitted on 11/22/2017 with and remains critically ill with hypoxic resp failure and ARDS  Active problems and key treatments for today include:  Neuro- will dc versed drip and start precedex and versed wears off  CV- paced. A-fib on elequis and metoprolol  pulm- recent CT negative for PE- hypoxic resp failure continue protective vent strategy.  Will start to wean flolan tomorrow.  Will consider proning if oxygenation worsens.  Stop steroids  GI- tube feeds at goal ppx with protonix  ID- on zosyn if no signs of infection can DC abx tomorrow am  Heme- continue elquis  Ventilation Mode: CMV/AC  FiO2 (%): 55 %  Rate Set (breaths/minute): 26 breaths/min  Tidal Volume Set (mL): 320 mL  PEEP (cm H2O): 15 cmH2O  Oxygen Concentration (%): 55 %  Resp: 14    Usual Cares:  DVT Prophylaxis: elequis  GI Prophylaxis: PPI  Restraints: Restraints for medical healing needed: YES  Diet: tube feeds  Code status:  DNR  Dispo: ICU    Family update by me today: No      I spent a total of 71 minutes providing critical care services exclusive of procedures.    CT Sanchez

## 2017-12-04 NOTE — PLAN OF CARE
ICU End of Shift Summary.  For vital signs and complete assessments, please see documentation flowsheets.     Pertinent assessments: RASS -4. Patient has eye opening with repositioning but does not move extremities. Ventilator at 60% FiO2. Lungs coarse, diminished bases. Heart is regular, paced rhythm. BPs stable. Rectal tube in place, liquid stool output. Loyd in place, fair urine output.  Major Shift Events: Patient was asynchronous with the vent at beginning of shift. Sedation increased, Telehub notified and discussed restarting paralytic. Patient then became synchronous with vent about 30 minutes after adjusting sedation. Paralytic was held and patient stayed synchronous with vent for the remainder of shift.   Patient had frequent ectopy on tele monitor at beginning of shift, electrolytes checked. Telehub notified of low potassium and phos, replacement protocol followed.   Plan (Upcoming Events): Continue to monitor. Update family on plans.  Discharge/Transfer Needs: Pending.     Bedside Shift Report Completed :   Bedside Safety Check Completed:

## 2017-12-04 NOTE — PROGRESS NOTES
RT- patient remains on current/ordered vent settings:    Ventilation Mode: CMV/AC  FiO2 (%): 60 %  Rate Set (breaths/minute): 26 breaths/min  Tidal Volume Set (mL): 320 mL  PEEP (cm H2O): 15 cmH2O    Breath sounds mildly coarse with ETT suctioned for small, thick/tanish secretions.    BVM at bedside with Peep valve. Patient continues to be on inhaled Flolan and Duoneb in line.    Will continue to monitor patient.    Vidya Ji, RT  12/3/2017 7:11 PM

## 2017-12-04 NOTE — PROGRESS NOTES
RT note:    Patient remains on full mechanical ventilation support. Settings are CMV/ AC rate of 26, tidal volume 320, peep of 15 and 60% FIO2. On these settings, patient's saturations have been in the mid to high 90's. Breath sounds are coarse pre and post nebulizer treatment. Suctioned scant amount of thick cloudy secretions.     Will continue ordered nebulizers along with continuous Flolan.     Upon arrival to shift, patient was breath stacking. RT was informed some sedation and paralytics were taken off earlier in the morning. Tele-hub was called. Increased sedation and the issue was resolved. Were able to get patient's peak pressures from the low 40's to come down to the mid 30's. Patient appears more comfortable.    Will continue to follow and monitor.    Faye Barnett, RRT

## 2017-12-04 NOTE — PROGRESS NOTES
CLINICAL NUTRITION SERVICES - REASSESSMENT NOTE    Recommendations Ordered by Registered Dietitian (RD):   Continue TF and fluid flush as ordered  - Ordered Nutrisource Fiber 4x daily for added 12 g fiber    Malnutrition: Non-severe     EVALUATION OF PROGRESS TOWARD GOALS   Enteral intake - Monitor for adequacy, tolerance, stooling, labs    Diet: NPO 2/2 intubation   Nutrition Support: Pt continues on EN at goal as follows with minimal documented interruptions.      Nutrition Support Enteral:  Type of Feeding Tube: Nasojejunal   Enteral Frequency:  Continuous  Enteral Regimen: Peptamen Intense VHP @ 45 ml/hr  Total Enteral Provisions: 960 mL provides 960 kcal, 89 gm protein (2 gm per kg IBW, 1.1 g per kg actual body weight), 806 ml free H2O, 75 gm CHO and 4 gm Fiber daily. Meets < 100% of DRI's --> Certavite  Free Water Flush: 120 mL Q4Hrs    Tolerance:   I/O - 1000 mL out via rectal tube yesterday.   Weight trending slightly up, 80.3 kg today.   Vitals:    11/30/17 0606 12/01/17 0543 12/02/17 0200 12/03/17 0200   Weight: 78.1 kg (172 lb 2.9 oz) 78.3 kg (172 lb 9.9 oz) 78.8 kg (173 lb 11.6 oz) 78.6 kg (173 lb 4.5 oz)    12/04/17 0200   Weight: 80.3 kg (177 lb 0.5 oz)       Labs Reviewed -   Na NL, trending down with increased fluid flushes  K, Phos NL  Mg 2.7 (H)  BGs <150    Meds Reviewed -  Insulin gtt  Versed for sedation, Prop off (was running at low dose over weekend)   Paralyzed 12/3, no off.     ASSESSED NUTRITION NEEDS (PER APPROVED PRACTICE GUIDELINES, Dosing weight: 73.8 kg for energy, 45 kg IBW for protein):  Estimated Energy Needs: 810-1040 kcals (11-14 Kcal/Kg)  Justification: obese and vented  Estimated Protein Needs: 67-90+ grams protein (1.5-2+ g pro/Kg)  Justification: hypercatabolism with critical illness and CKD  Estimated Fluid Needs: >1 mL/Kcal  Justification: maintenance    Previous Goals:   TF at goal rate to meet % of estimated needs while NPO  Evaluation: Met    Previous Nutrition  Diagnosis:   Increased nutrient needs (protein) related to wound healing and critical illness as evidenced by stage 2 vs unstageable coccyx pressure injury, EN reliance since 11/27  Evaluation: No change, updated below    MALNUTRITION  % Weight Loss:  Weight loss does not meet criteria for malnutrition  % Intake:  Decreased intake does not meet criteria for malnutrition - meeting full needs on EN  Subcutaneous Fat Loss: Mild or greater  Muscle Loss: Mild to moderate  Fluid Retention: Mild    Malnutrition Diagnosis: Non-Severe malnutrition  In Context of:  Chronic illness or disease with high risk for further decline    CURRENT NUTRITION DIAGNOSIS  Increased nutrient needs (proteinm ?fiber) related to wound healing and critical illness as evidenced by stage 2 vs unstageable coccyx pressure injury, EN reliance since 11/27, with increased liquid stooling over the past couple days.     INTERVENTIONS  Recommendations / Nutrition Prescription  Continue EN regimen as ordered:    Enteral Regimen: Peptamen Intense VHP at 45 mL/hr x 24 hours    Total Enteral Provisions: 1080 mL provides 1080 kcal, 100 gm protein (1.3 gm per kg actual body weight), 907 ml free H2O, 84 gm CHO and 4 gm Fiber daily.    Meets < 100% of DRI's, 5545 international units Vitamin A, 346 mg Vitamin C and 26 mg Zinc    Free Water Flush: 120 mL q4 hours, or per MD discretion       Per pressure injury protocol, continue Certavite and Tri-Vi-Sol 7 mL for an additional 59880 international units Vitamin A, 2800 international units Vitamin D and 245 mg Vitamin C    Nutrisource Fiber 4x daily for added 12 g fiber     Implementation  EN Composition: increased fiber as above  Collaboration and Referral of Nutrition care: pt discussed during interdisciplinary rounds    Goals  TF at goal rate to meet % of estimated needs while NPO      MONITORING AND EVALUATION:  Progress towards goals will be monitored and evaluated per protocol and Practice  Guidelines      Sania Trevizo RD, LD  3rd floor/ICU: [phone]  All other floors: [phone]  Weekend/holiday: [phone]  Office: [phone]

## 2017-12-04 NOTE — PLAN OF CARE
Problem: Patient Care Overview  Goal: Plan of Care/Patient Progress Review  Outcome: Improving  ICU End of Shift Summary.  For vital signs and complete assessments, please see documentation flowsheets.     Pertinent assessments: Pt deeply sedated. No longer medically paralyzed. ETT vent, 70% FiO2, 26 RR, SpO2 94%. TF running at 45/h. PIV x3 in right hand/arm. Midline to left AC. NS 10/h in PIV, zosyn Q6, fentanyl 100mcg/h, versed 7mg/h, insulin gtt 4u/hr, solumedrol 1000mg QD x3days, flolan 0.91mcg/min. Loyd, UOP adequate. Echo - severe pulm htn  Major Shift Events: DC Vec, DC propofol, Weaned FiO2 to 70%, + insulin gtt, midline dressing change  Plan (Upcoming Events): Monitor resp status, abx, insulin gtt, wean FiO2 as able  Discharge/Transfer Needs: ? Comfort cares if solumedrol doesn't improve resp status    Bedside Shift Report Completed : Yes  Bedside Safety Check Completed: Yes

## 2017-12-04 NOTE — PROGRESS NOTES
Midline dressing noted to have blood pooling under tegaderm around biopatch. Arm repositioned for dressing change and site had constant bleeding. Pressure applied, dressing changed with Geofoam and gauze using sterile technique. New dressing now CDI and and tubing secured to prevent pulling. Dressing due to be changed 12/4/17 by 16:30 d/t gauze under tegaderm.

## 2017-12-05 NOTE — PROGRESS NOTES
Respiratory Therapy    Patient seen resting in bed on mechanical ventilator settings:    Ventilation Mode: CMV/AC  FiO2 (%): 45 %  Rate Set (breaths/minute): 26 breaths/min  Tidal Volume Set (mL): 320 mL  PEEP (cm H2O): 15 cmH2O  Oxygen Concentration (%): 45 %  Resp: 27    Respiratory rate 26, breath sounds clear/coarse, and SpO2 95%. Suctioning small/scant, cloudy, thick secretions from ETT. Patient will continue to receive continuous Flolan nebulizer and Duoneb QID. RT to follow.    Aishwarya Faulkner  December 5, 2017, 3:04 AM

## 2017-12-05 NOTE — PLAN OF CARE
Problem: Patient Care Overview  Goal: Plan of Care/Patient Progress Review  Outcome: No Change  ICU End of Shift Summary.  For vital signs and complete assessments, please see documentation flowsheets.     Pertinent assessments: Paced. Tolerating ventilator. Lungs clear. Minimal secretions. Tolerating tube feeding. Insulin gtt continues. Adequate urine output.  Major Shift Events: None  Plan (Upcoming Events): Continue current plan of care  Discharge/Transfer Needs: Continue current plan of care    Bedside Shift Report Completed : Y  Bedside Safety Check Completed: Y

## 2017-12-05 NOTE — PROGRESS NOTES
Right wrist and Left wrist restraints initiated on patient on 12/4/2017 at 6:49 PM    Clinical Justification: Pulling lines, pulling tubes, and pulling equipment while changing sedation medication and hoping to increase alertness.   Less Restrictive Alternative: Repositioning, Re-evaluate equipment, Pain management, Disguise equipment, Reorientation  Attending Physician Notified: Yes, Attending Physician's Name: William   Order received: Yes     Family Notification: Other   Criteria explained to son  Patient's Response: No evidence of learning  Restraint care Plan initiated: Yes    Shana Rubin

## 2017-12-05 NOTE — PROGRESS NOTES
[facility]  Palliative Care Progress Note  Text Page     Assessment & Plan   Kat Gomez is a 89 year old female who was admitted on 11/22/2017. I was asked to see the patient for goals of care.     Recommendations:  1. Dyspnea - Pt remains on the ventilator.  Sedated.  2. Diarrhea - dietician consulted to add fiber to TF. Nursing to remove rectal tube if no perirectal wounds present.  3. Pain -Pateint noted with chronic low back pain likely flare due to critical illness. Pt with recent R ankle fx 11/2/2017. Continue with fentanyl gtt as per Intensivist and hospitalist. Pt has a brace for her R ankle if she becomes ambulatory. Acetaminophen 650 mg PO/per feeding tube q 4 hours prn pain or fever.  4. Decision making capacity unreliable while sedated and on ventilator- Per advance directive, patient has 5 named health care agents who may act independently but are cohesive in decision making.  Son Jim has agreed to be spokesperson for family.  Goal of Care: DNR.  Restorative. Plan for care conference tomorrow at 1030.  Family has understanding that patient would not want long term ventilatory support and this hospitalization may include withdrawal from ventilator to comfort, they have been able to discuss this with her.     Disease Process/es & Symptoms:  Kat Gomez is a 89 year old patient admitted with symptoms of shortness of breath, cough, fatigue. She has been treated for acute hypoxic respiratory failure, acute on chronic CHF, CAP, bilat pleural effusion, A fib and nonsustained vtach, CAD, ARF/CKD, hypokalemia, hypernatremia.  Cardiac echo 12/3/17 significant for mild left ventricular hypertrophy, est. EF 55-60%, basilar and mid-inferior hypokinesia, mild to moderate mitral valve regurgitation, mild to moderate tricuspid valve regurgitation, dilated IVC, elevated r ventricular systolic pressure and decreased systolic pressure. Comparison to previous study, pulmonary artery pressures  are higher and MR and TR are more severe.     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  The Patient has moved to ICU from ED and patient care unit for higher level of care, assistance with all ADLS and intubation 11/27/17.     The following symptoms are noted by patient as concerning to her quality of life. Per nursing and family, as pt is currently intubated.  Pain   Dyspnea     Psychosocial/Spiritual Needs:  Pt is Gnosticist. Adult children are supportive of pt.  Oriented to Spiritual Health and Social Work Services as part of Palliative Care team. Consultation placed for  to follow. Consultation placed for  to follow.     Decision-Making & Goals of Care:  Discussion/counseling today about goals of care/decisions:   12/4/2017 Sunshine Alejandro met with son Jim over the phone. Introduced myself and palliative care.   He states that the family feels very informed and up to date from the health care team. He tells me that several days ago when pt was able to participate, they had a conference with the doctors who explained pt's medical condition and made recommendations for her care, and the pt was able to write OK to the care plan.  He tells me that the pt is 89 and has lived a good life. They hope that she will get better but also know that pt may not survive this hospitalization. She would not want to be kept alive with many tubes, equipment and other cares needed to live. Right now she is improving, although it has been up and down with her condition since she has been here.  Reviewed current plan of care and symptom management with Jim. He agrees to be the point person for the care team to communicate with his family as he is always available by phone. His  sister Deanne is here at [facility] but sometimes is working as nurse in [employer]. Kristel Miller is recovering from current hospitalization.  Brothers are in [place] and [place].      Jim is agreeable to  consult for pt. He hopes that they will be able to have pt return to live with him after discharge.     Patient has decision-making capacity Unreliable  Patient has a Health Care Agent named in a legal Advance Directive document dated 12/1/2015. See name(s) and contact information in Health Care Agent/Legal Guardian section below.  Name: Nito Mccormick Patricia, Relationship: son, Phone(s): [phone].  Name: Asad Gunn Patricia, Relationship: son, Phone(s): [phone].  Name: Jayson Wadsworth Patricia, Relationship: son, Phone(s): [phone]; c): [phone].  Name: Deanne Encinas, Relationship: daughter, Phone(s): [phone].  Name: Kristel Gomez, Relationship: daughter, Phone(s): [phone].     Patient has a completed health care directive available in the chart (Y/N): Y  There is no POLST (Physician orders for life-sustaining treatment) form on file for this patient  Code Status:                     Do not resuscitate      Findings & plan of care discussed with: Dr. Kennedy, bedside nurse, ICU Intensivist  Follow-up plan from palliative team: Will continue to follow this pt for symptom management and support for pt and family with goals of care.  Thank you for involving us in the patient's care.       Bhumi Irby APRN, CNS  Pain Management and Palliative Care  [facility]  Pgr: [phone]    Time Spent on this Encounter   I spent  20 minutes in assessment of the patient and discussion with the patient and family. Another 15 minutes in review of chart, documentation and discussion with the health care team.    Interval History   Remains on ventilator, Intesivist notes ARDS.    Review of Systems    unable    Physical Exam   Temp:  [99.3  F (37.4  C)-100.2  F (37.9  C)]  99.9  F (37.7  C)  Heart Rate:  [59-70] 60  Resp:  [12-27] 16  BP: ()/(50-78) 124/61  FiO2 (%):  [45 %-60 %] 50 %  SpO2:  [89 %-100 %] 96 %  176 lbs 9.42 oz  GEN:  Sedated. No purposeful movement to voice, noxious stimuli, appears comfortable and breathing is synchronous with the ventilator  HEENT:  Normocephalic/atraumatic, no scleral icterus, no nasal discharge, mouth moist.  CV:  100% paced rhythm. S1, S2; no murmurs or other irregularities noted.   +3 DP/PT pulses bilatererally; Very slight edema BLE.  RESP:  Coarse lung sounds to auscultation bilaterally without wheezing/retractions.  Symmetric chest rise on inhalation noted.  Normal respiratory effort.  ABD:  Rounded, soft, non-tender/non-distended.  +BS. Rectal tube with liquid brown stool.  EXT:  Edema & pulses as noted above.  CMS intact x 4.     M/S:   Non-Tender to palpation - sedated   SKIN:  Dry to touch, no exanthems noted in the visualized areas.  Nursing reports dressing on coccyx.  NEURO: Sedated.  Psych:  Sedated.  Medications     dexmedetomidine 1 mcg/kg/hr (12/05/17 1548)     propofol (DIPRIVAN) infusion 10 mcg/kg/min (12/05/17 1548)     IV fluid REPLACEMENT ONLY       epoprostenol (FLOLAN) 20 mcg/mL in glycine diluent inhalation solution Stopped (12/05/17 1422)     vecuronium (NORCURON) infusion ADULT Stopped (12/03/17 0830)     fentaNYL 150 mcg/hr (12/05/17 1548)     NaCl Stopped (12/05/17 1100)     IV fluid REPLACEMENT ONLY       - MEDICATION INSTRUCTIONS -         insulin glargine  35 Units Subcutaneous QAM AC     insulin aspart  4 Units Subcutaneous Q4H     insulin aspart  1-12 Units Subcutaneous Q4H     fiber modular  1 packet Per Feeding Tube 4x Daily     epoprostenol (FLOLAN) syringe change   Inhalation Q8H     pantoprazole  40 mg Per Feeding Tube Daily     vitamin A-D & C drops  7 mL Per Feeding Tube Daily     multivitamins with minerals  15 mL Per Feeding Tube Daily     chlorhexidine  15 mL Mouth/Throat BID     sodium chloride  (PF)  10 mL Intracatheter Q8H     apixaban ANTICOAGULANT  2.5 mg Per NG tube BID     gabapentin  300 mg Per Feeding Tube At Bedtime     simvastatin  40 mg Oral or Feeding Tube At Bedtime     amantadine  100 mg Per Feeding Tube Daily     sodium chloride (PF)  3 mL Intracatheter Q8H     ipratropium - albuterol 0.5 mg/2.5 mg/3 mL  3 mL Nebulization 4x Daily       Data   Results for orders placed or performed during the hospital encounter of 11/22/17 (from the past 24 hour(s))   Glucose by meter   Result Value Ref Range    Glucose 149 (H) 70 - 99 mg/dL   Glucose by meter   Result Value Ref Range    Glucose 157 (H) 70 - 99 mg/dL   Glucose by meter   Result Value Ref Range    Glucose 169 (H) 70 - 99 mg/dL   Glucose by meter   Result Value Ref Range    Glucose 143 (H) 70 - 99 mg/dL   Glucose by meter   Result Value Ref Range    Glucose 143 (H) 70 - 99 mg/dL   Glucose by meter   Result Value Ref Range    Glucose 164 (H) 70 - 99 mg/dL   Glucose by meter   Result Value Ref Range    Glucose 189 (H) 70 - 99 mg/dL   Glucose by meter   Result Value Ref Range    Glucose 133 (H) 70 - 99 mg/dL   Glucose by meter   Result Value Ref Range    Glucose 162 (H) 70 - 99 mg/dL   Glucose by meter   Result Value Ref Range    Glucose 147 (H) 70 - 99 mg/dL   Glucose by meter   Result Value Ref Range    Glucose 140 (H) 70 - 99 mg/dL   Glucose by meter   Result Value Ref Range    Glucose 131 (H) 70 - 99 mg/dL   Glucose by meter   Result Value Ref Range    Glucose 150 (H) 70 - 99 mg/dL   Creatinine   Result Value Ref Range    Creatinine 1.07 (H) 0.52 - 1.04 mg/dL    GFR Estimate 48 (L) >60 mL/min/1.7m2    GFR Estimate If Black 58 (L) >60 mL/min/1.7m2   Glucose by meter   Result Value Ref Range    Glucose 145 (H) 70 - 99 mg/dL   Glucose by meter   Result Value Ref Range    Glucose 139 (H) 70 - 99 mg/dL   Glucose by meter   Result Value Ref Range    Glucose 151 (H) 70 - 99 mg/dL   Glucose by meter   Result Value Ref Range    Glucose 172 (H) 70 - 99  mg/dL   Blood gas arterial with oxyhemoglobin   Result Value Ref Range    pH Arterial 7.42 7.35 - 7.45 pH    pCO2 Arterial 41 35 - 45 mm Hg    pO2 Arterial 67 (L) 80 - 105 mm Hg    Bicarbonate Arterial 27 21 - 28 mmol/L    FIO2 45%     Oxyhemoglobin Arterial 92 92 - 100 %    Base Excess Art 1.9 mmol/L   Glucose by meter   Result Value Ref Range    Glucose 163 (H) 70 - 99 mg/dL   Glucose by meter   Result Value Ref Range    Glucose 143 (H) 70 - 99 mg/dL   Glucose by meter   Result Value Ref Range    Glucose 133 (H) 70 - 99 mg/dL   Glucose by meter   Result Value Ref Range    Glucose 151 (H) 70 - 99 mg/dL   Glucose by meter   Result Value Ref Range    Glucose 159 (H) 70 - 99 mg/dL

## 2017-12-05 NOTE — PLAN OF CARE
Problem: Patient Care Overview  Goal: Plan of Care/Patient Progress Review  ICU End of Shift Summary.  For vital signs and complete assessments, please see documentation flowsheets.     Pertinent assessments: Continues to need full vent support with PEEP of 15 and FiO2 of 50%. Lungs coarse. Flolan weaned off this shift. Tele - 100% paced. Tmax 100.4. Sedated throughout the shift. Weaned Propofol to 5mL; however, labored breathing increased. Propofol currently at 10. Precedex at 1. RASS -1 to -3 this shift. Pain controlled with Fentanyl at 150. Discontinued Insulin gtt and changed to SSI q4 hours. TF at goal of 45 with 120 H2O q4 hour flushes.  Good urine output. Watery stool output of over 1000ml in rectal tube. Oral cares and q2 hour turns with A2. POC reviewed at length with family.   Major Shift Events: Discontinued Zosyn today.   Plan (Upcoming Events): Care Conference with family tomorrow @ 1190.   Discharge/Transfer Needs: TBD    Bedside Shift Report Completed : yes  Bedside Safety Check Completed: yes

## 2017-12-05 NOTE — PROGRESS NOTES
Kat Gomez is a 89 year old female who was admitted on 11/22/2017 with and remains critically ill hypoxic resp failure and ARDS  Active problems and key treatments for today include:  Neuro- on precedex drip wean propofol  CV- pacer dependent afib on elequis and metoprolol  pulm- stable  Got a course of steroids. MNB and now off flolan  GI tube feeds and protonix  ID- has been on abx since 11/22 now on day 10 of zosyn culutures negative will DC abx  Ventilation Mode: CMV/AC  FiO2 (%): 50 %  Rate Set (breaths/minute): 26 breaths/min  Tidal Volume Set (mL): 320 mL  PEEP (cm H2O): 15 cmH2O  Oxygen Concentration (%): 45 %  Resp: 18    Usual Cares:  DVT Prophylaxis: elequis  GI Prophylaxis: PPI  Restraints: Restraints for medical healing needed: YES  Diet: tube feeds  Code status:  DNR  Dispo: ICU    Family update by me today: No      I spent a total of 68 minutes providing critical care services exclusive of procedures.    CT Sanchez

## 2017-12-05 NOTE — PROGRESS NOTES
[facility]  Hospitalist Progress Note  David Kennedy MD 12/05/2017    Reason for Stay (Diagnosis): Respiratory failure.         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 She was transferred to the ICU on admission due to respiratory failure requiring BiPAP. She was intubated on 11/27 due to worsening respiratory failure.  CT scan on 11/30 did not show PE but did show worsening patchy groundglass infiltrates.  She has been treated for pneumonia as well as IV diuretics and continued to require moderate to high vent support to the point that flolan was added and there was consideration of proning on 12/2.  She was treated with empiric high dose IV steroids and has had some improvement over the past few days from a respiratory standpoint.       Problem List:   1. Acute hypoxic respiratory failure.  Suspected to be due to pneumonia.  Intubated.  Continue vent support.   Abx and steroids as below.  Working on goals of care w/ family as reportedly she may not want a trach and is DNR.  Somewhat prolonged intubation already (intubated on 11/27).  Appreciate intensivist, palliative care input.  --weaning O2 gradually    2. Pneumonia.  CT Chest showed worsening infiltrates 11/30.  Continue IV Zosyn.  IV Vanco stopped 12/2.  Started on IV Solumedrol on 12/2 empirically (high dose, received 1 gram x 2 days)  --vent management/sedation as per intensivist.    3. Acute on chronic diastolic CHF exacerbation.  Previously treated with a course of IV diuretisc.  Hold further  "Lasix for now.  Monitor daily weight and strict I&O's.      4. A fib.  Continue Eliquis.  Hold Metoprolol for low BP (Low BP likely due to Propofol).    5. GI prophylaxis.  Protonix.    6. Hyperglycemia.  Changed Novolog SSI to Insulin drip while on steroids.  Now off IV solu-medrol, transitioned back to sub-cu regimen.    7. FEN: On  Tube feedings.  Appreciate RD input.  Check and replace K, mag as needed, adjust free water flushes pending Na trend.    8. Hyperlipidemia.  Zocor.    9. CAD.  Zocor, Eliquis.    10. Chronic kideny disease.  Creatinine stable at ~1  Avoid nephrotoxins as able.  Monitor.    11. Recent ankle fracture.  Pain meds PRN.  12. Anemia: monitor.  No recent signs of blood loss.  13. Diarrhea: rectal tube in place.  Adding more fiber, c. Dif negative.  ?due to tube feeds.  14.  Hx of cerebellar ataxia: reportedly uses a cane at baseline.  15.   Pressure Injury located on coccyx\" \"Stage 2 vs Unstageable present on admission\"    DVT Prophylaxis: Eliquis.  Code Status: DNR.  Palliative care following re: goals of care pending ability to wean from the vent.  Not clear that she would want a trach.  Discharge Dispo: TBD  Estimated Disch Date / # of Days until Disch: 3+           Interval History (Subjective):      Intubated, Sedated.  Weaning O2  Off steroids since yesterday  Back on sub-cu insulin regimen  Discussed w/ palliative care  No acute events  Diarrhea: c. Dif negative               Physical Exam:      Last Vital Signs:  BP 99/59  Pulse 60  Temp 99.5  F (37.5  C)  Resp 19  Ht 1.524 m (5')  Wt 80.1 kg (176 lb 9.4 oz)  SpO2 96%  BMI 34.49 kg/m2    Gen:  Intubated, sedated.  Eyes:  PERRL, sclera anicteric.  OP:  MMM, no lesions.  Neck:  Supple.  CV:  Regular, no murmurs.  Lung:  Coarse lung sounds b/l.  Ab:  +BS, soft.  Skin:  Warm, dry to touch.  No rash.  Ext:  1+ pitting edema LE b/l.           Medications:      All current medications were reviewed with changes reflected in problem " list.         Data:      All new lab and imaging data was reviewed.   Labs:    Recent Labs  Lab 12/05/17  0520 12/04/17  0500   NA  --  144   POTASSIUM  --  3.8   CHLORIDE  --  111*   CO2  --  27   ANIONGAP  --  6   GLC  --  129*   BUN  --  62*   CR 1.07* 0.98   GFRESTIMATED 48* 53*   GFRESTBLACK 58* 64   OLI  --  7.6*       Recent Labs  Lab 12/04/17  0500   WBC 12.2*   HGB 8.1*   HCT 25.6*   MCV 90         Imaging:   Recent Results (from the past 24 hour(s))   XR Chest Port 1 View    Narrative    CHEST ONE VIEW PORTABLE  12/4/2017 10:24 AM     HISTORY: Hypoxia.    COMPARISON: December 2, 2017      Impression    IMPRESSION: Endotracheal tube is again identified proximal to the  ping. Esophagogastric tube courses below the level of the diaphragm,  though extends out of field-of-view below the diaphragm. Right  hemithorax dual-lead pacemaker again identified and unchanged. Shallow  inspiration. Scattered bilateral pulmonary opacities, basilar  predominant. Suspect lower lobe opacities have slightly improved in  the left lower lobe, though perhaps minimally worsened in the right  lower lobe. Upper lobe opacities are less apparent relative to lower  lobe, though slightly progressed since previous exam. No pneumothorax.  Small, if any, pleural effusion.    ROSANNE HOUSE MD

## 2017-12-05 NOTE — PROGRESS NOTES
"ICU End of Shift Summary.  For vital signs and complete assessments, please see documentation flowsheets.     Pertinent assessments: Dc'd versed gtt today-changed to precedex-when versed wore off, pt breathing labored and stacked and almost like \"guppy\" breathing on vent. MD notified and propofol added and told RN to start at 50mcg to achieve calm breathing and then titrate from there. VSS. paola TF. UOP adequate. Palliative consult ordered today. Insulin gtt remains.    Major Shift Events: see above  Plan (Upcoming Events): Wean vent as paola.   Discharge/Transfer Needs: tbd    Bedside Shift Report Completed : yes  Bedside Safety Check Completed:yes        "

## 2017-12-05 NOTE — PROGRESS NOTES
Ventilation Mode: CMV/AC  FiO2 (%): 45 %  Rate Set (breaths/minute): 26 breaths/min  Tidal Volume Set (mL): 320 mL  PEEP (cm H2O): 15 cmH2O  Oxygen Concentration (%): 45 %  Resp: 25    Patient remains on full ventilatory support.  Fio2 weaned to 45% this shift, otherwise no vent changes made.  Continuous Flolan with QID duoneb given as ordered.  BS coarse, suctioning small cloudy secretions.  Resp will continue to follow.

## 2017-12-05 NOTE — PROGRESS NOTES
SPIRITUAL HEALTH SERVICES Progress Note  [facility] ICU     SH f/u per plan of care. Pt remains sedated and intubated. Spent time in silent presence and then offered prayer and Scripture reading ending with the Lord's Prayer. Will continue to follow.    JOANNE Fay.  Staff    Pager #[phone]

## 2017-12-06 NOTE — PROGRESS NOTES
Patient remained on vent settings as followed during the shift:  Ventilation Mode: CMV/AC  FiO2 (%): 50 %  Rate Set (breaths/minute): 26 breaths/min  Tidal Volume Set (mL): 320 mL  PEEP (cm H2O): 15 cmH2O  Oxygen Concentration (%): 50 %  Resp: 37    No vent changes made today. Breath sounds are diminished, coarse. Suctioning out small amount of creamy secretions. Plan is for possible extubation tomorrow morning. Will continue to monitor patient.     Rosy Huynh , RT  December 6, 2017 5:52 PM

## 2017-12-06 NOTE — PROGRESS NOTES
"SPIRITUAL HEALTH SERVICES Progress Note  [facility] ICU 3rd floor    SH f/u with pt/family. Kat remains on ventilator support. Family met with palliative care team this morning and Jim shares that they have decided to move toward compassionate extubation tomorrow morning. They are requesting Kat receive [facility] prior to this. Have contacted her home parish of [employer] in [place] ([phone]) and they are arranging for a  to come for today to [place].   Briefly processed the morning's events with Jim, providing support. Jim notes that \"This would be important to Mom.\" Jim somewhat tearful as he shares this. Provided support through reflective listening presence.   SH will continue to follow.    Addendum: Fr. Higginsirvin from [employer] came and anointed Kat. Will f/u with family again in the morning. SH remains available per staff/family request.     JOANNE Fay.  Staff    Pager #[phone]     "

## 2017-12-06 NOTE — PROGRESS NOTES
Patient remained on vent settings as followed during the shift:  Ventilation Mode: CMV/AC  FiO2 (%): 50 %  Rate Set (breaths/minute): 26 breaths/min  Tidal Volume Set (mL): 320 mL  PEEP (cm H2O): 15 cmH2O  Oxygen Concentration (%): 50 %  Resp: 24      Breath sounds are diminished. Suctioning out thick tan secretions. Scheduled nebs given inline with the vent. Flolan stopped today at 1100.  Will continue to monitor patient.     Rosy Huynh , RT  December 5, 2017 6:57 PM

## 2017-12-06 NOTE — PROGRESS NOTES
Kat Gomez is a 89 year old female who was admitted on 11/22/2017 with and remains critically ill hypoxic respiratory failure and ARDS  Active problems and key treatments for today include:  Family meeting was held and given that patient had expressed that she would not want to be ventilated for a prolonged period, family has decided to pursue comfort measure and will plan for palliative extubation tomorrow morning    Neuro- comfort meds started  resp palliative extubation tomorrow am    Ventilation Mode: CMV/AC  FiO2 (%): 50 %  Rate Set (breaths/minute): 26 breaths/min  Tidal Volume Set (mL): 320 mL  PEEP (cm H2O): 15 cmH2O  Oxygen Concentration (%): 501 %  Resp: 30      Family update by me today: Yes       I spent a total of 57 minutes providing critical care services exclusive of procedures.    CT Sanchez

## 2017-12-06 NOTE — PROGRESS NOTES
RT note:    Patient remain on full ventilator support. Settings are CMV/AC Rate of 26, tidal volume 320, peep of 15 and 50% FIO2. Breath sounds are clear slightly diminished pre and post nebulizer treatment. Suctioned scant amount of clear to pale secretions.  Will continue to follow and monitor.    Faye Barnett, RRT

## 2017-12-06 NOTE — PROGRESS NOTES
[facility]  Hospitalist Progress Note  David Kennedy MD 12/06/2017    Reason for Stay (Diagnosis): Respiratory failure.         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 She was transferred to the ICU on admission due to respiratory failure requiring BiPAP. She was intubated on 11/27 due to worsening respiratory failure.  CT scan on 11/30 did not show PE but did show worsening patchy groundglass infiltrates.  She has been treated for pneumonia as well as IV diuretics and continued to require moderate to high vent support to the point that flolan was added and there was consideration of proning on 12/2.  She was treated with empiric high dose IV steroids and has had some improvement over the past few days from a respiratory standpoint though still is requiring significant ventilatory support.    Family has been present and palliative care following.  After family meeting (please see Dr. Bradford's note) they have reportedly decided on transition to comfort cares with palliative/terminal extubation 12/7.       Problem List:   1. Acute hypoxic respiratory failure.  Suspected to be due to pneumonia.  Intubated.  Continue vent support for now.   Abx and steroids as below.  Have been working on clarifying goals of care w/ family as reportedly she may not want a trach and is DNR with tentative plans for palliative/terminal extubation on 12/5.  Appreciate intensivist, palliative care input.    2. Pneumonia.  CT Chest showed worsening infiltrates 11/30.  coimpleted a 10  "day course of IV Zosyn.  IV Vanco stopped 12/2.  Started on IV Solumedrol on 12/2 empirically (high dose, received 1 gram x 2 days)  --vent management/sedation as per intensivist.    3. Acute on chronic diastolic CHF exacerbation.  Previously treated with a course of IV diuretisc.  Hold further Lasix for now.      4. A fib.  remains on Eliquis.  Held Metoprolol for low BP (Low BP likely due to Propofol).    5. GI prophylaxis.  Protonix.    6. Hyperglycemia.  Changed Novolog SSI to Insulin drip while on steroids.  Now off IV solu-medrol, transitioned back to sub-cu regimen.    7. FEN: On  Tube feedings.  Appreciate RD input.  Check and replace K, mag as needed, adjust free water flushes pending Na trend.    8. Hyperlipidemia.      9. CAD.   Eliquis.    10. Chronic kideny disease.  Creatinine stable at ~1    11. Recent ankle fracture.  Pain meds PRN.  12. Anemia: monitored.  No recent signs of blood loss.  13. Diarrhea: rectal tube in place.  Added more fiber, c. Dif negative.  ?due to tube feeds.  14.  Hx of cerebellar ataxia: reportedly uses a cane at baseline.  15.   Pressure Injury located on coccyx\" \"Stage 2 vs Unstageable present on admission\"    DVT Prophylaxis: Eliquis.  Code Status: DNR.  Palliative care following re: goals of care.  Changed to comfort care status  Discharge Dispo: TBD  Estimated Disch Date / # of Days until Disch: as per intensivist plan for palliative/terminal extubation 12/7           Interval History (Subjective):      Intubated, Sedated.  Remains on moderate vent setting  Febrile  Family meeting attended by Dr. Bradford this AM - note orders for comfort care status w/ plan for extubation tomorrow              Physical Exam:      Last Vital Signs:  /52  Pulse 60  Temp 102.2  F (39  C)  Resp 30  Ht 1.524 m (5')  Wt 78 kg (171 lb 15.3 oz)  SpO2 94%  BMI 33.58 kg/m2    Gen:  Intubated, sedated.  Eyes:  PERRL, sclera anicteric.  OP:  MMM, no lesions.  Neck:  Supple.  CV:  Regular, no " murmurs.  Lung:  Coarse lung sounds b/l.  Ab:  +BS, soft.  Skin:  Warm, dry to touch.  No rash.  Ext:  1+ pitting edema LE b/l.           Medications:      All current medications were reviewed with changes reflected in problem list.         Data:      All new lab and imaging data was reviewed.   Labs:    Recent Labs  Lab 12/06/17  0528      POTASSIUM 2.8*   CHLORIDE 112*   CO2 25   ANIONGAP 7   *   BUN 54*   CR 0.80   GFRESTIMATED 68   GFRESTBLACK 82   OLI 6.5*       Recent Labs  Lab 12/06/17  0528   WBC 10.4   HGB 8.1*   HCT 26.3*   MCV 92         Imaging:     No results found for this or any previous visit (from the past 24 hour(s)).

## 2017-12-06 NOTE — PLAN OF CARE
Problem: Pneumonia (Adult)  Goal: Signs and Symptoms of Listed Potential Problems Will be Absent, Minimized or Managed (Pneumonia)  Signs and symptoms of listed potential problems will be absent, minimized or managed by discharge/transition of care (reference Pneumonia (Adult) CPG).   Outcome: No Change  ICU End of Shift Summary.  For vital signs and complete assessments, please see documentation flowsheets.     Pertinent assessments: pt has rested with the precidex, fent, propofol was reduced to 5 mcg, tolerated it well, VSS, t max 101.6, tele hub wanted tylenol given, con't in 100% paced, rectal tube put out 350 this shift, lungs are coarse to clear, sats are 95% this am, uo has been good.  Major Shift Events: none  Plan (Upcoming Events): con't to monitor, family conference this am  Discharge/Transfer Needs: TBD,     Bedside Shift Report Completed : yes  Bedside Safety Check Completed: yes

## 2017-12-06 NOTE — PROGRESS NOTES
[facility] In[facility] Adult Pressure Injury (PI) Wound Assessment     Assessment of PI(s) on pt's:   Coccyx    Data:   Patient History:      per MD note(s):   88 year old female with a history of Aortic Valve Repair, Ascending Aortic Dissection s/p repair, Complete Heart Block s/p PPM, CAD s/p bypass, HTN, HLD, LISSET not on cpap, Syncope, Paroxysmal SVT who presents to the ED today from the [facility] [place] 2/2 shortness of breath and fatigue.   : pt has now moved to Comfort cares; discussion with RN who will continue POC    Current mattress:  Evolve ICU bed  Current pressure relieving devices:  Mepilex dressing and Pillows    Moisture Management:  Urinary Catheter    Catheter secured? Yes    Current Diet / Nutrition:       Active Diet Order      NPO for Medical/Clinical Reasons Except for: NPO but receiving Tube Feeding  Tod Assessment and sub scores:   Tod Score  Av.5  Min: 9  Max: 17                                                                                                                     Pressure Injury Assessment  (location #1):   Coccyx  Seen   Wound History:   Present on admission    Specific Dimensions (length x width x depth, in cm) :   1 x 0.5 x 0.1cm     Wound Base: 100% fairly dry whitish- yellow tissue, unsure if dermis- Stage 2 vs Slough- Unstageable ,      Palpation of the wound bed:  normal    Slough appearance:  adherent, dry, white and yellow    Eschar appearance:  none    Periwound Skin: intact, non blanchable erythema 0.2cm entire periwound, minimal erosion         Intervention:     Patient's chart evaluated.      Tod Interventions:  Current Tod Interventions and Care Plan reviewed and updated, appropriate at this time.    Orders  And Supplies reviewed with RN    Discussed plan of care with Nurse           Assessment:     Pressure Injury located on coccyx: Unstageable present on admission      Status: Comfort care and will plan to  "continue with Mepilex         Plan:     Nursing to notify the Provider(s) and re-consult the WOC Nurse if wound(s) deteriorate(s)or if the wound care plan needs reevaluation.    Plan for wound care to wound located on coccyx: Every 3 days    1. Cleanse wound gently with MicroKlenz spray/ pat dry    2. Apply Mepilex 4x4 dressing at dasia angle to cleft, spread skin flat before application. Date  3. No sting barrier to periwound to improve adherence    BED:  Reposition side to side every 1-2 hours when awake.   Keep heels elevated  As able keep HOB below 30 degrees  Evaluate and consider a Pulsate specialty mattress  NOTE: If pt is refusing to turn or reposition they must be educated on the potential injury from not offloading pressure.  Then this \"educated refusal\" needs to be documented as an \"educated refusal to turn/ reposition\" and document if alert, etc.  Additionally please notify MD and charge nurse of refusal(s).            WOC Nurse will return: completed  Face to face time: 5 min discussion POC    "

## 2017-12-07 NOTE — PROGRESS NOTES
Koby the [employer] rep called to let staff know that the rep doesn't turn off pacers, 's in the am will have to do this, [employer] will come in to turn off the pacer with the MD present if that is what the MD wants.

## 2017-12-07 NOTE — PROGRESS NOTES
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 She was transferred to the ICU on admission due to respiratory failure requiring BiPAP. She was intubated on 11/27 due to worsening respiratory failure.  CT scan on 11/30 did not show PE but did show worsening patchy groundglass infiltrates.  She has been treated for pneumonia as well as IV diuretics and continued to require moderate to high vent support to the point that flolan was added and there was consideration of proning on 12/2.  She was treated with empiric high dose IV steroids and has had some improvement over the past few days from a respiratory standpoint though still is requiring significant ventilatory support.      Palliative care conference yesterday ---plan for palliative/terminal extubation later this am.      Problem List:   1. Acute hypoxic respiratory failure.  Suspected to be due to pneumonia.  Intubated.  Continue vent support for now.  Has been difficult to vent effectively over the last few days.   Abx and steroids to continue, for now.   Palliative care consult appreciated.  Plan for terminal/palliative extubation this am.     2. Pneumonia, community-acquiried   CT Chest showed worsening infiltrates 11/30.  Completed a 10 day course of IV Zosyn.  " IV Vanco stopped 12/2.  Started on IV Solumedrol on 12/2 empirically (high dose, received 1 gram x 2 days)             Tolerating vent settings.       3. Acute on chronic diastolic CHF exacerbation.  Previously treated with a course of IV diuresis.  Hold further Lasix for now.       4. A fib.  remains on Eliquis.  Metoprolol ordered as BP tolerates.                   Also has a pacemaker - attempting to find tech to turn off her                           pacemaker after extubation.  5. GI prophylaxis.  Protonix.     6. Hyperglycemia.  Changed Novolog SSI to Insulin drip while on steroids.  Now off IV solu-medrol, transitioned back to sub-cu regimen.     7. FEN: On  Tube feedings.  Appreciate RD input.  Check and replace K, mag as needed, adjust free water flushes pending Na trend.     8. Hyperlipidemia.       9. CAD.   Eliquis.     10. Chronic kidney disease.  Creatinine stable at ~1     11. Recent ankle fracture.  Pain meds PRN.  12. Anemia: monitored.  No recent signs of blood loss.  13. Loose stools: rectal tube in place.  Added more fiber, c. Dif negative.  ?due to tube feeds.  14.  Hx of cerebellar ataxia: reportedly uses a cane at baseline.  15.   Pressure Injury located on coccyx\" \"Stage 2 vs Unstageable present on admission\"     DVT Prophylaxis: Eliquis.  Code Status: DNR.  Palliative care following re: goals of care.  Changed to comfort care status  Discharge Dispo: TBD  Estimated Disch Date / # of Days until Disch: as per intensivist plan for palliative/terminal extubation 12/7        Interval History:      Late entry - pt seen and examined at 0845. Intubated and sedated.  No new concerns overnight.  Tolerating vent settings well.  Having some loose stools---fecal tube in place.                 Physical Exam:      Last Vital Signs:  Temp: 101.7  F (38.7  C) Temp src: Bladder BP: 93/47   Heart Rate: 60 Resp: 26 SpO2: 100 % O2 Device: Mechanical Ventilator    I/O last 3 completed shifts:  In: 2399.35 " [I.V.:1654.35; NG/GT:520]  Out: 2695 [Urine:1795; Stool:900]    GEN:  Ventilated and sedated  HEENT:  Normocephalic/atraumatic, PERRL, no scleral icterus, no nasal discharge, hearing aids in place, ET tube in place.    NECK:  No clear thyromegaly of clear JVD  CV:  Regular rate and rhythm, no murmur to ausc.  S1 + S2 noted, no S3 or S4.  LUNGS:  Mildly course lung sounds to auscultation bilaterally.  No clear rales or wheezing auscultated bilaterally.  No costal retractions bilaterally.  Symmetric chest rise on inhalation noted.  ABD:  Slower but present bowel sounds, soft, non-tender, mildly distended.  No rebound/guarding/rigidity.  No masses palpated.  No obvious HSM to exam.  EXT:  No significant edema or cyanosis bilaterally. No joint synovitis noted.  No calf-tenderness or asymmetry noted.  SKIN:  Dry to touch, no rashes or jaundice noted.  PSYCH:  Sedated and resting comfortably  NEURO:  Sedated and resting comfortably       Medications:      All current medications were reviewed.         Data:      All new lab and imaging data was reviewed.   Labs:    Recent Labs  Lab 12/06/17 0528 12/05/17 0520 12/04/17  0500 12/03/17 2125     --  144 145*   POTASSIUM 2.8*  --  3.8 2.8*   CHLORIDE 112*  --  111* 109   CO2 25  --  27 28   ANIONGAP 7  --  6 8   *  --  129* 158*   BUN 54*  --  62* 60*   CR 0.80 1.07* 0.98 1.04   GFRESTIMATED 68 48* 53* 50*   GFRESTBLACK 82 58* 64 60*   OLI 6.5*  --  7.6* 7.6*       Recent Labs  Lab 12/06/17 0528 12/04/17  0500 12/02/17  0515   WBC 10.4 12.2* 14.4*   HGB 8.1* 8.1* 8.6*   HCT 26.3* 25.6* 28.3*   MCV 92 90 92    245 260       Recent Labs  Lab 12/06/17 0528 12/05/17 0520 12/04/17  0500 12/03/17 2125     --  144 145*   POTASSIUM 2.8*  --  3.8 2.8*   CHLORIDE 112*  --  111* 109   CO2 25  --  27 28   ANIONGAP 7  --  6 8   *  --  129* 158*   BUN 54*  --  62* 60*   CR 0.80 1.07* 0.98 1.04   GFRESTIMATED 68 48* 53* 50*   GFRESTBLACK 82 58* 64 60*    OLI 6.5*  --  7.6* 7.6*   MAG  --   --  2.7* 2.7*   PHOS  --   --  2.8 2.3*     No results for input(s): TSH in the last 168 hours.  No results for input(s): COLOR, APPEARANCE, URINEGLC, URINEBILI, URINEKETONE, SG, UBLD, URINEPH, PROTEIN, UROBILINOGEN, NITRITE, LEUKEST, RBCU, WBCU in the last 168 hours.   Recent Imaging:   No results found for this or any previous visit (from the past 24 hour(s)).

## 2017-12-07 NOTE — SIGNIFICANT EVENT
MD DEATH PRONOUNCEMENT    Called to pronounce Kat Gomez dead.    Physical Exam: Unresponsive to noxious stimuli, Spontaneous respirations absent, Breath sounds absent, Carotid pulse absent, Heart sounds absent and Pupillary light reflex absent    Patient was pronounced dead at 12:39 PM, 2017.    Active Problems:    Respiratory failure with hypoxia (H)    End of life care       Infectious disease present?: NO    Communicable disease present? (examples: HIV, chicken pox, TB, Ebola, CJD) :  NO    Multi-drug resistant organism present? (example: MRSA): NO    Please consider an autopsy if any of the following exist:  NO Unexpected or unexplained death during or following any dental, medical, or surgical diagnostic treatment procedures.   NO Death of mother at or up to seven days after delivery.     NO All  and pediatric deaths.     NO Death where the cause is sufficiently obscure to delay completion of the death certificate.   NO Deaths in which autopsy would confirm a suspected illness/condition that would affect surviving family members or recipients of transplanted organs.     The following deaths must be reported to the 's Office:  NO A death that may be due entirely or in part to any factors other than natural disease (recent surgery, recent trauma, suspected abuse/neglect).   NO A death that may be an accident, suicide, or homicide.     NO Any sudden, unexpected death in which there is no prior history of significant heart disease or any other condition associated with sudden death.   NO A death under suspicious, unusual, or unexpected circumstances.    NO Any death which is apparently due to natural causes but in which the  does not have a personal physician familiar with the patient s medical history, social, or environmental situation or the circumstances of the terminal event.   NO Any death apparently due to Sudden Infant Death Syndrome.     NO Deaths that occur  during, in association with, or as consequences of a diagnostic, therapeutic, or anesthetic procedure.   NO Any death in which a fracture of a major bone has occurred within the past (6) six months.   NO A death of persons note seen by their physician within 120 days of demise.     NO Any death in which the  was an inmate of a public institution or was in the custody of Law Enforcement personnel.   NO  All unexpected deaths of children   NO Solid organ donors   NO Unidentified bodies   NO Deaths of persons whose bodies are to be cremated or otherwise disposed of so that the bodies will later be unavailable for examination;   NO Deaths unattended by a physician outside of a licensed healthcare facility or licensed residential hospice program   NO Deaths occurring within 24 hours of arrival to a health care facility if death is unexpected.    NO Deaths associated with the decedent s employment.   NO Deaths attributed to acts of terrorism.   NO Any death in which there is uncertainty as to whether it is a medical examiner s care should be discussed with the medical investigator.

## 2017-12-07 NOTE — PLAN OF CARE
Problem: Patient Care Overview  Goal: Plan of Care/Patient Progress Review  Outcome: No Change  ICU End of Shift Summary.  For vital signs and complete assessments, please see documentation flowsheets.     Pertinent assessments: Continues on full vent support. Changed to comfort cares this shift. Propofol and precedex for sedation of RASS -3. Ativan and dilaudid given for increased labor of breathing this afternoon/evening. Large output in rectal tube of 800mL, did not pull for comfort. Good output in arenas. A2 for cares. POC reviewed at length today before, during and after care conference.   Major Shift Events: Changed to comfort cares this shift.   Plan (Upcoming Events): Terminal Extubation planned for 1030 in morning after pacemaker shut off.   Discharge/Transfer Needs: ---    Bedside Shift Report Completed : yes  Bedside Safety Check Completed: yes

## 2017-12-07 NOTE — PROGRESS NOTES
RT Note:    Patient continued on ventilator CMV 26/320/+15/50% overnight. BS coarse/diminished. SPO2 94-96%. Plan for terminal extubation this morning.     Irma Amos, RT 12/7/2017, 5:03 AM

## 2017-12-07 NOTE — PROGRESS NOTES
Kat Gomez is a 89 year old female who was admitted on 11/22/2017 with and remains critically ill hypoxic respiratory failure and ARDS  Active problems and key treatments for today include:  Family meeting was held 12/6/17 and given that patient had expressed that she would not want to be ventilated for a prolonged period, family has decided to pursue comfort measure and will plan for palliative extubation today  Neuro- continue comfort meds  resp palliative extubation today    Son and two daughers both updated and still agreeable to palliative extubation    I spent a total of 32 minutes providing critical care services exclusive of procedures.     CT Sanchez

## 2017-12-07 NOTE — PLAN OF CARE
Problem: Pneumonia (Adult)  Goal: Signs and Symptoms of Listed Potential Problems Will be Absent, Minimized or Managed (Pneumonia)  Signs and symptoms of listed potential problems will be absent, minimized or managed by discharge/transition of care (reference Pneumonia (Adult) CPG).   Outcome: No Change  ICU End of Shift Summary.  For vital signs and complete assessments, please see documentation flow sheets.     Pertinent assessments: at the beginning of the shift pt looked like she was working hard to breathe, palliative nurse called and was updated, fent pca was restarted, pt looked comfortable after that, at 2330 pt was asynchronous with the vent, the propofol was increased and pt was comfortable after that, pt has been comfortable the rest of the night.    Plan (Upcoming Events): extubate, con't comfort cares  Discharge/Transfer Needs: TBD    Bedside Shift Report Completed : yes  Bedside Safety Check Completed:yes

## 2017-12-07 NOTE — PROGRESS NOTES
[facility]  Palliative Care Progress Note  Text Page      8 pm- Called to check on status of patient. Nusrrex reports Increase RR, has eye open at times.  Feels would benefit from CI of Fentanyl   Will resume Fentanyl  150 mcg /hr and give bolus IV of Dilaudid prn.  Martina Maddox, TOO,CNP    Pain Management and Palliative care   [facility]      Assessment & Plan   Kat Gomez is a 89 year old female who was admitted on 11/22/2017. I was asked to see the patient for goals of care.     Recommendations:  1. Dyspnea - Pt remains on the ventilator.  Sedated. Plans for terminal extubation 12/07/17 at ~10:30 am   2. Diarrhea - tube feedings stopped hopefully will slow down diarrhea . Nursing to remove rectal tube if no perirectal wounds present.  3. Pain -Pateint noted with chronic low back pain likely flare due to critical illness. Pt with recent R ankle fx 11/2/2017. Now with bolus doses of Dilaudid IV or SL   Acetaminophen 650 mg suppository  4. Decision making capacity unreliable while sedated and on ventilator- Per advance directive, patient has 5 named health care agents who may act independently but are cohesive in decision making.  Son Jim has agreed to be spokesperson for family.  Goal of Care: DNR.  Comfort care, symptom management      Disease Process/es & Symptoms:  Kat Gomez is a 89 year old patient admitted with symptoms of shortness of breath, cough, fatigue. She has been treated for acute hypoxic respiratory failure, acute on chronic CHF, CAP, bilat pleural effusion, A fib and nonsustained vtach, CAD, ARF/CKD, hypokalemia, hypernatremia.  Cardiac echo 12/3/17 significant for mild left ventricular hypertrophy, est. EF 55-60%, basilar and mid-inferior hypokinesia, mild to moderate mitral valve regurgitation, mild to moderate tricuspid valve regurgitation, dilated IVC, elevated r ventricular systolic pressure and decreased systolic pressure. Comparison to  previous study, pulmonary artery pressures are higher and MR and TR are more severe.     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  The Patient has moved to ICU from ED and patient care unit for higher level of care, assistance with all ADLS and intubation 17.     The following symptoms are noted by patient as concerning to her quality of life. Per nursing and family, as pt is currently intubated.  Pain   Dyspnea     Psychosocial/Spiritual Needs:  Pt is Yazidi. Adult children are supportive of pt. She had anointing of the sick with her  of her home Taoist  Oriented to Spiritual Health and Social Work Services as part of Palliative Care team. Consultation placed for  to follow. Consultation placed for  to follow.     Decision-Making & Goals of Care:  Discussion/counseling today about goals of care/decisions:   17  Family conference with son Jim and Deanne her daughter from 11:10- 11:35, Dr. HOWARD ICU intensivist, discussed the severity of patient's condition. Although she has improved in her condition, her condition is still grave with poor prognosis.    The family feels she would not want to live bed bound and reliant on the care of others.  If patient  Did recover further, per Dr. HOWARD her recovery would be slow and high probability she would not be out of bed and would be dependant on others.   Jim decided and stated all family members in agreement to change the direction of  to comfort.  Other family coming this evening, so terminal extubation is planned for  at about 10:30 am.  For now , no further escalation of care.  [place] [facility] [employer] for pacemaker shut off tomorrow, prior to extubation.  Medications prior to  extubation will be written for in am ( Prophol a lower dose, bolus if ativan and dilaudid)   Family informed patient is to be cremated and is organ donor on her DL.    Family appears to be coping well and appreciative of care she is receiving.   12/4/2017 Sunshine Alejandro met with son Jim over the phone. Introduced myself and palliative care.   He states that the family feels very informed and up to date from the health care team. He tells me that several days ago when pt was able to participate, they had a conference with the doctors who explained pt's medical condition and made recommendations for her care, and the pt was able to write OK to the care plan.  He tells me that the pt is 89 and has lived a good life. They hope that she will get better but also know that pt may not survive this hospitalization. She would not want to be kept alive with many tubes, equipment and other cares needed to live. Right now she is improving, although it has been up and down with her condition since she has been here.  Reviewed current plan of care and symptom management with Jim. He agrees to be the point person for the care team to communicate with his family as he is always available by phone. His sister Deanne is here at [facility] but sometimes is working as nurse in [place]. Kristel Miller is recovering from current hospitalization.  Brothers are in [place] and [place].      Jim is agreeable to  consult for pt. He hopes that they will be able to have pt return to live with him after discharge.     Patient has decision-making capacity Unreliable  Patient has a Health Care Agent named in a legal Advance Directive document dated 12/1/2015. See name(s) and contact information in Health Care Agent/Legal Guardian section below.  Name: Nito Mccormick Patricia, Relationship: son, Phone(s): [phone].  Name: Asad Gomez, Relationship: son, Phone(s): [phone].  Name: Jayson Wadsworth Patricia, Relationship: son, Phone(s):  [phone]; c): [phone].  Name: Deanne Encinas, Relationship: daughter, Phone(s): [phone].  Name: Kristel Gomez, Relationship: daughter, Phone(s): [phone].     Patient has a completed health care directive available in the chart (Y/N): Y  There is no POLST (Physician orders for life-sustaining treatment) form on file for this patient  Code Status:                     Do not resuscitate      Findings & plan of care discussed with: Dr. Kennedy, bedside nurse, ICU Intensivist  Follow-up plan from palliative team: Will continue to follow this pt for symptom management and support for pt and family with goals of care.  Thank you for involving us in the patient's care.       Martina GAGE, CNS  Pain Management and Palliative Care  [facility]  Pgr: [phone]    Time Spent on this Encounter   I spent  30  minutes in assessment of the patient and discussion with the patient and family. Another 20 minutes in review of chart, documentation and discussion with the health care team.    Interval History   Remains on ventilator, Intesivist notes ARDS.  Uncomfortable on lower doses of Prophol    Review of Systems    unable    Physical Exam   Temp:  [100  F (37.8  C)-102.6  F (39.2  C)] 102.6  F (39.2  C)  Heart Rate:  [60-64] 60  Resp:  [15-54] 26  BP: ()/(48-60) 111/52  FiO2 (%):  [50 %] 50 %  SpO2:  [90 %-97 %] 96 %  171 lbs 15.34 oz  GEN:  Sedated. No purposeful movement to voice, noxious stimuli, appears comfortable and breathing is synchronous with the ventilator  HEENT:  Normocephalic/atraumatic, no scleral icterus, no nasal discharge, mouth moist.  CV:  100% paced rhythm. S1, S2; no murmurs or other irregularities noted.   +3 DP/PT pulses bilatererally; Very slight edema BLE.  RESP:  Coarse lung sounds to auscultation bilaterally without wheezing/retractions.  Symmetric chest rise on inhalation noted.  Normal respiratory effort.  ABD:  Rounded,  soft, non-tender/non-distended.  +BS. Rectal tube with liquid brown stool.  EXT:  Edema & pulses as noted above.  CMS intact x 4.     M/S:   Non-Tender to palpation - sedated   SKIN:  Dry to touch, no exanthems noted in the visualized areas.  Nursing reports dressing on coccyx.  NEURO: Sedated.  Psych:  Sedated.  Medications     propofol (DIPRIVAN) infusion 25 mcg/kg/min (12/06/17 1822)     NaCl 50 mL/hr at 12/06/17 1600     dexmedetomidine 1 mcg/kg/hr (12/06/17 1549)     IV fluid REPLACEMENT ONLY       NaCl Stopped (12/05/17 1100)     IV fluid REPLACEMENT ONLY       - MEDICATION INSTRUCTIONS -         chlorhexidine  15 mL Mouth/Throat BID     sodium chloride (PF)  10 mL Intracatheter Q8H     sodium chloride (PF)  3 mL Intracatheter Q8H     ipratropium - albuterol 0.5 mg/2.5 mg/3 mL  3 mL Nebulization 4x Daily       Data   Results for orders placed or performed during the hospital encounter of 11/22/17 (from the past 24 hour(s))   Glucose by meter   Result Value Ref Range    Glucose 166 (H) 70 - 99 mg/dL   Glucose by meter   Result Value Ref Range    Glucose 151 (H) 70 - 99 mg/dL   Glucose by meter   Result Value Ref Range    Glucose 124 (H) 70 - 99 mg/dL   CBC with platelets   Result Value Ref Range    WBC 10.4 4.0 - 11.0 10e9/L    RBC Count 2.86 (L) 3.8 - 5.2 10e12/L    Hemoglobin 8.1 (L) 11.7 - 15.7 g/dL    Hematocrit 26.3 (L) 35.0 - 47.0 %    MCV 92 78 - 100 fl    MCH 28.3 26.5 - 33.0 pg    MCHC 30.8 (L) 31.5 - 36.5 g/dL    RDW 14.6 10.0 - 15.0 %    Platelet Count 216 150 - 450 10e9/L   Basic metabolic panel   Result Value Ref Range    Sodium 144 133 - 144 mmol/L    Potassium 2.8 (L) 3.4 - 5.3 mmol/L    Chloride 112 (H) 94 - 109 mmol/L    Carbon Dioxide 25 20 - 32 mmol/L    Anion Gap 7 3 - 14 mmol/L    Glucose 137 (H) 70 - 99 mg/dL    Urea Nitrogen 54 (H) 7 - 30 mg/dL    Creatinine 0.80 0.52 - 1.04 mg/dL    GFR Estimate 68 >60 mL/min/1.7m2    GFR Estimate If Black 82 >60 mL/min/1.7m2    Calcium 6.5 (L) 8.5 -  10.1 mg/dL   XR Chest Port 1 View    Narrative    XR CHEST PORT 1 VW  12/6/2017 5:44 AM     HISTORY:  hypoxia;     COMPARISON: Film performed on 12/4/2017    FINDINGS:  ET tube is in place. Feeding tube is present. Midline  sternotomy. Right cardiac device is in place. Heart is enlarged.  Interstitial and alveolar infiltrate is seen bilaterally. Pleural  effusions.      Impression    IMPRESSION: Findings would be consistent with congestive heart  failure. No significant change.    RAFAEL THACKER MD   Blood gas arterial with oxyhemoglobin   Result Value Ref Range    pH Arterial 7.39 7.35 - 7.45 pH    pCO2 Arterial 42 35 - 45 mm Hg    pO2 Arterial 70 (L) 80 - 105 mm Hg    Bicarbonate Arterial 26 21 - 28 mmol/L    FIO2 50%     Oxyhemoglobin Arterial 93 92 - 100 %    Base Excess Art 0.5 mmol/L   Glucose by meter   Result Value Ref Range    Glucose 163 (H) 70 - 99 mg/dL

## 2017-12-07 NOTE — PROGRESS NOTES
Propofol stopped at 1140. Extubated at 1215 via RT. Pt pronounced at 1239 by Dr. Bradford. Family at bedside with pt. Support given.    [facility] notified referred to [facility]. [facility] notified- pt candidate for eye research. ME notified because pt fell and fractured rt ankle on 11/2/17. Per ME pt does not meet criteria for autopsy.      Ice placed on pt per Eye bank. Awaiting for Eye [facility] to harvest before pt transfers to [facility].

## 2017-12-07 NOTE — PROGRESS NOTES
[facility]  Palliative Care Progress Note  Text Page        Assessment & Plan   Kat Gomez is a 89 year old female who was admitted on 11/22/2017. I was asked to see the patient for goals of care.     Recommendations:  1. Dyspnea - Pt remains on the ventilator.  Sedated. Plans for terminal extubation today  at ~10:30 am. Family at bedside feels needs are being met.  Continuous infusion of Fentanyl for symptom management.  Propofol for sedation until extubation. Versed drip started prior to extubation.   2. Diarrhea - tube feedings stopped hopefully will slow down diarrhea . Nursing to remove rectal tube if no perirectal wounds present.  3. Pain -Pateint noted with chronic low back pain likely flare due to critical illness. Pt with recent R ankle fx 11/2/2017. Now with bolus doses of Dilaudid IV or SL Pain medications as above, appears comfortable    Acetaminophen 650 mg suppository  4. Decision making capacity unreliable while sedated and on ventilator- Per advance directive, patient has 5 named health care agents who may act independently but are cohesive in decision making.  Son Jim has agreed to be spokesperson for family.  Goal of Care: DNR.  Comfort care, symptom management., end of life care      Disease Process/es & Symptoms:  Kat Gomez is a 89 year old patient admitted with symptoms of shortness of breath, cough, fatigue. She has been treated for acute hypoxic respiratory failure, acute on chronic CHF, CAP, bilat pleural effusion, A fib and nonsustained vtach, CAD, ARF/CKD, hypokalemia, hypernatremia.  Cardiac echo 12/3/17 significant for mild left ventricular hypertrophy, est. EF 55-60%, basilar and mid-inferior hypokinesia, mild to moderate mitral valve regurgitation, mild to moderate tricuspid valve regurgitation, dilated IVC, elevated r ventricular systolic pressure and decreased systolic pressure. Comparison to previous study, pulmonary artery pressures are higher and MR  and TR are more severe.     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  The Patient has moved to ICU from ED and patient care unit for higher level of care, assistance with all ADLS and intubation 17.     The following symptoms are noted by patient as concerning to her quality of life. Per nursing and family, as pt is currently intubated.  Pain   Dyspnea     Psychosocial/Spiritual Needs:  Pt is Taoism. Adult children are supportive of pt. She had anointing of the sick with her  of her home [employer]ism yesterday.   Oriented to Spiritual Health and Social Work Services as part of Palliative Care team. Consultation placed for  to follow.      Decision-Making & Goals of Care:  Discussion/counseling today about goals of care/decisions:   I was present with the family at time of extubation until the time of her death.   17  Family conference with son Jim and Deanne her daughter from 11:10- 11:35, Dr. HOWARD ICU intensivist, discussed the severity of patient's condition. Although she has improved in her condition, her condition is still grave with poor prognosis.    The family feels she would not want to live bed bound and reliant on the care of others.  If patient  Did recover further, per Dr. HOWARD her recovery would be slow and high probability she would not be out of bed and would be dependant on others.   Jim decided and stated all family members in agreement to change the direction of  to comfort.  Other family coming this evening, so terminal extubation is planned for  at about 10:30 am.  For now , no further escalation of care.  Nurse at [facility] contacted [employer] for pacemaker shut off tomorrow, prior to extubation.  Medications prior to extubation will be  written for in am ( Prophol a lower dose, bolus if ativan and dilaudid)   Family informed patient is to be cremated and is organ donor on her DL.    Family appears to be coping well and appreciative of care she is receiving.   12/4/2017 Sunshine Alejandro met with son Jim over the phone. Introduced myself and palliative care.   He states that the family feels very informed and up to date from the health care team. He tells me that several days ago when pt was able to participate, they had a conference with the doctors who explained pt's medical condition and made recommendations for her care, and the pt was able to write OK to the care plan.  He tells me that the pt is 89 and has lived a good life. They hope that she will get better but also know that pt may not survive this hospitalization. She would not want to be kept alive with many tubes, equipment and other cares needed to live. Right now she is improving, although it has been up and down with her condition since she has been here.  Reviewed current plan of care and symptom management with Jim. He agrees to be the point person for the care team to communicate with his family as he is always available by phone. His sister Deanne is here at [facility] but sometimes is working as nurse in [place]. Kristel Miller is recovering from current hospitalization.  Brothers are in [place] and [place].      Jim is agreeable to  consult for pt. He hopes that they will be able to have pt return to live with him after discharge.     Patient has decision-making capacity Unreliable  Patient has a Health Care Agent named in a legal Advance Directive document dated 12/1/2015. See name(s) and contact information in Health Care Agent/Legal Guardian section below.  Name: Nito Mccormick Patricia, Relationship: son, Phone(s): [phone].  Name: Asad Gomez, Relationship: son, Phone(s): [phone].  Name: Jayson Wadsworth Patricia, Relationship: son, Phone(s): [phone]; c):  [phone].  Name: Deanne Encinas, Relationship: daughter, Phone(s): [phone].  Name: Kristel Gomez, Relationship: daughter, Phone(s): [phone].     Patient has a completed health care directive available in the chart (Y/N): Y  There is no POLST (Physician orders for life-sustaining treatment) form on file for this patient  Code Status:                     Do not resuscitate      Findings & plan of care discussed with: Dr. Kennedy, bedside nurse, ICU Intensivist  Follow-up plan from palliative team: Will continue to follow this pt for symptom management and support for pt and family with goals of care.  Thank you for involving us in the patient's care.       Martina GAGE, CNS  Pain Management and Palliative Care  [facility]  Pgr: [phone]    Time Spent on this Encounter   I spent  45 minutes in assessment of the patient and discussion with the patient and family. Another 10 minutes in review of chart, documentation and discussion with the health care team.    Interval History   Planned terminal extubation today  Review of Systems    unable    Physical Exam   Temp:  [101.7  F (38.7  C)-102.9  F (39.4  C)] 101.7  F (38.7  C)  Heart Rate:  [59-61] 60  Resp:  [25-54] 26  BP: ()/(45-58) 93/47  FiO2 (%):  [50 %] 50 %  SpO2:  [94 %-100 %] 100 %  171 lbs 15.34 oz  GEN:  Sedated. No purposeful movement to voice, noxious stimuli, appears comfortable and breathing is synchronous with the ventilator  HEENT:  Normocephalic/atraumatic, no scleral icterus, no nasal discharge, mouth moist.  CV:  100% paced rhythm. S1, S2; no murmurs or other irregularities noted.   +3 DP/PT pulses bilatererally; Very slight edema BLE.  RESP:  Coarse lung sounds to auscultation bilaterally without wheezing/retractions.  Symmetric chest rise on inhalation noted.  Normal respiratory effort.  ABD:  Rounded, soft, non-tender/non-distended.  +BS. Rectal tube with liquid brown  stool.  EXT:  Edema & pulses as noted above.  CMS intact x 4.     M/S:   Non-Tender to palpation - sedated   SKIN:  Dry to touch, no exanthems noted in the visualized areas.  Nursing reports dressing on coccyx.  NEURO: Sedated.  Psych:  Sedated.    Medications     midazolam 3 mg/hr (12/07/17 1112)     dexmedetomidine 1 mcg/kg/hr (12/07/17 1118)     propofol (DIPRIVAN) infusion Stopped (12/07/17 1138)     NaCl 50 mL/hr at 12/06/17 1800     fentaNYL 150 mcg/hr (12/07/17 0734)     IV fluid REPLACEMENT ONLY       NaCl Stopped (12/05/17 1100)     IV fluid REPLACEMENT ONLY       - MEDICATION INSTRUCTIONS -         chlorhexidine  15 mL Mouth/Throat BID     sodium chloride (PF)  10 mL Intracatheter Q8H     sodium chloride (PF)  3 mL Intracatheter Q8H     ipratropium - albuterol 0.5 mg/2.5 mg/3 mL  3 mL Nebulization 4x Daily       Data   No results found for this or any previous visit (from the past 24 hour(s)).

## 2017-12-07 NOTE — TELEPHONE ENCOUNTER
Call received from Lee Ann from [facility] medical officer at [phone]. Pt was pronounced dead today at 12:39 pm from [facility].     Requesting call back from  to inquire whether her cause of death can be part of the fall took place on 11/02/17? She is aware that  is gone for the day, ok to wait till tomorrow.     Obie, RN  Triage Nurse

## 2017-12-07 NOTE — PROGRESS NOTES
Respiratory Therapy    Patient was terminally extubated at 12:15 P.M MD and RN both present.       Kimberly Bhatti RRT  12/7/2017

## 2017-12-07 NOTE — PROGRESS NOTES
NUTRITION BRIEF NOTE  RD following for nutrition support.   Upon chart review, comfort care election. RD to sign off. Please page/consult as needed.     Barbara Sanon RDN, LD, [facility]  3rd floor/ICU: [phone]  All other floors: [phone]  Weekend/holiday: [phone]  Office: [phone]

## 2017-12-07 NOTE — PROGRESS NOTES
SPIRITUAL HEALTH SERVICES Progress Note  [facility] ICU 3rd floor    SH f/u per plan of care. Met with family (sisters, Kristel and Deanne; grandson, Ky; son, Boaz) as they gathered bedside in preparation to support Kat through compassionate extubation procedure.   Family shared very briefly about Kat and her Zoroastrianism stewart.   They noted with gratefulness that pt had been anointed yesterday and to be present for that.   Family requested bedside prayer, prior to extubation. Family gathered and placed hands on Kat and we shared in prayer together along with Scripture reading.  No other needs expressed at this time.   Will continue to follow.    JOANNE Fay.  Staff    Pager #[phone]

## 2017-12-08 NOTE — DISCHARGE SUMMARY
Death Summary    Ms. Gomez was in the ICU and intubated for a prolonged period d/t acute hypoxic respiratory failure.  Etiology was felt to be multifactorial.  She was intubated and mechanically ventilated and treated with abx for pneumonia, IV steroids and IV diuresis.  She developed ARDS and required higher ventilator settings.  She was unable to be weaned off of the ventilator.  Palliative care was consulted and family elected for palliative extubation.  She was continued on comfort medications and extubated on 12:15pm.  Time of death was 12:39pm, December 7, 2017.    Diagnosis:    Pneumonia   Hypoxic respiratory failure and ARDS  CHF  A fib s/p PCM

## 2017-12-08 NOTE — TELEPHONE ENCOUNTER
That seems unlikely.  I would recommend they ask the person completing her death certificate though as I was not a part of her hospitalization.   Kelli Del Toro MD

## 2018-12-21 NOTE — PLAN OF CARE
Problem: Patient Care Overview  Goal: Plan of Care/Patient Progress Review  Outcome: No Change  ICU End of Shift Summary.  For vital signs and complete assessments, please see documentation flowsheets.     Pertinent assessments: Sedated and vented. FIO2 70% , Rate 16 and Peep of 10. White thick clear secretions suctuonned through ETT.LS: clear.  on Precedex gtt , follows commands, Rass score -1. VSS. Afebrile .Paced. TF infusing at 35/hr (Goal). Loyd WDL in place adeqaute UOP. no changes to skin. Brace intact to RLE. CMS intact.   Major Shift Events: new orders for sliding scale insulin as pt is NPO and TF infusing, no other events, rested most shift.   Plan (Upcoming Events): continue vent,  wean Fi02 as tolerated, continue IV abx, follow up labs and Cx.  Discharge/Transfer Needs: continue ICU cares    Bedside Shift Report Completed : yes  Bedside Safety Check Completed: yes      Problem: Restraint, Nonbehavioral (Nonviolent)  Goal: Rationale and Justification  Outcome: No Change  Bilateral soft restraints continued 11/29/2017    Clinical Justification: Pulling lines, pulling tubes, and pulling equipment  Less Restrictive Alternative: Repositioning, Re-evaluate equipment, Disguise equipment  Attending Physician Notified: Yes, Attending Physician's Name: Katie   New orders placed : yes  Length of Order: 1 Day      Asad Nix      Agree with Student assessment and Documentation  Latoya Dove RN       21-Dec-2018 20:17

## 2020-02-28 NOTE — PROGRESS NOTES
Call placed to [employer] answering service regarding plan to turn off pacemaker tomorrow about 1030.Answering service placed message with the local [employer] representative.   Ph number: [phone]     Additional Notes: - Briefly discussed etiology today\\n- Will consider LN2 vs Efudex to face in the future Detail Level: Simple Additional Notes: - Start Ketoconazole shampoo Lather to scalp x 5-10 min and face x 1 min 2-3x weekly Additional Notes: **Likely myxoid cyst.**\\n\\n- Discussed possible etiology\\n- If removal desired, f/u with hand surgeons
